# Patient Record
Sex: FEMALE | Race: WHITE
[De-identification: names, ages, dates, MRNs, and addresses within clinical notes are randomized per-mention and may not be internally consistent; named-entity substitution may affect disease eponyms.]

---

## 2018-04-15 ENCOUNTER — HOSPITAL ENCOUNTER (INPATIENT)
Dept: HOSPITAL 88 - ER | Age: 58
LOS: 8 days | Discharge: HOME | DRG: 189 | End: 2018-04-23
Attending: INTERNAL MEDICINE | Admitting: INTERNAL MEDICINE
Payer: MEDICARE

## 2018-04-15 VITALS — WEIGHT: 258.06 LBS | BODY MASS INDEX: 45.72 KG/M2 | HEIGHT: 63 IN

## 2018-04-15 DIAGNOSIS — I13.0: ICD-10-CM

## 2018-04-15 DIAGNOSIS — E87.5: ICD-10-CM

## 2018-04-15 DIAGNOSIS — L89.152: ICD-10-CM

## 2018-04-15 DIAGNOSIS — E83.41: ICD-10-CM

## 2018-04-15 DIAGNOSIS — E66.01: ICD-10-CM

## 2018-04-15 DIAGNOSIS — E87.8: ICD-10-CM

## 2018-04-15 DIAGNOSIS — J96.21: Primary | ICD-10-CM

## 2018-04-15 DIAGNOSIS — G47.33: ICD-10-CM

## 2018-04-15 DIAGNOSIS — Z79.01: ICD-10-CM

## 2018-04-15 DIAGNOSIS — Z88.2: ICD-10-CM

## 2018-04-15 DIAGNOSIS — Z88.0: ICD-10-CM

## 2018-04-15 DIAGNOSIS — K21.9: ICD-10-CM

## 2018-04-15 DIAGNOSIS — J45.901: ICD-10-CM

## 2018-04-15 DIAGNOSIS — I48.0: ICD-10-CM

## 2018-04-15 DIAGNOSIS — D64.9: ICD-10-CM

## 2018-04-15 DIAGNOSIS — J44.1: ICD-10-CM

## 2018-04-15 DIAGNOSIS — N18.3: ICD-10-CM

## 2018-04-15 DIAGNOSIS — I50.32: ICD-10-CM

## 2018-04-15 DIAGNOSIS — E11.22: ICD-10-CM

## 2018-04-15 PROCEDURE — 94010 BREATHING CAPACITY TEST: CPT

## 2018-04-15 PROCEDURE — 51700 IRRIGATION OF BLADDER: CPT

## 2018-04-15 PROCEDURE — 82553 CREATINE MB FRACTION: CPT

## 2018-04-15 PROCEDURE — 96372 THER/PROPH/DIAG INJ SC/IM: CPT

## 2018-04-15 PROCEDURE — 94660 CPAP INITIATION&MGMT: CPT

## 2018-04-15 PROCEDURE — 85730 THROMBOPLASTIN TIME PARTIAL: CPT

## 2018-04-15 PROCEDURE — 71046 X-RAY EXAM CHEST 2 VIEWS: CPT

## 2018-04-15 PROCEDURE — 99285 EMERGENCY DEPT VISIT HI MDM: CPT

## 2018-04-15 PROCEDURE — 80053 COMPREHEN METABOLIC PANEL: CPT

## 2018-04-15 PROCEDURE — 97139 UNLISTED THERAPEUTIC PX: CPT

## 2018-04-15 PROCEDURE — 80048 BASIC METABOLIC PNL TOTAL CA: CPT

## 2018-04-15 PROCEDURE — 36415 COLL VENOUS BLD VENIPUNCTURE: CPT

## 2018-04-15 PROCEDURE — 84100 ASSAY OF PHOSPHORUS: CPT

## 2018-04-15 PROCEDURE — 82140 ASSAY OF AMMONIA: CPT

## 2018-04-15 PROCEDURE — 82805 BLOOD GASES W/O2 SATURATION: CPT

## 2018-04-15 PROCEDURE — 94640 AIRWAY INHALATION TREATMENT: CPT

## 2018-04-15 PROCEDURE — 96374 THER/PROPH/DIAG INJ IV PUSH: CPT

## 2018-04-15 PROCEDURE — 93005 ELECTROCARDIOGRAM TRACING: CPT

## 2018-04-15 PROCEDURE — 83735 ASSAY OF MAGNESIUM: CPT

## 2018-04-15 PROCEDURE — 85610 PROTHROMBIN TIME: CPT

## 2018-04-15 PROCEDURE — 83880 ASSAY OF NATRIURETIC PEPTIDE: CPT

## 2018-04-15 PROCEDURE — 71045 X-RAY EXAM CHEST 1 VIEW: CPT

## 2018-04-15 PROCEDURE — 85025 COMPLETE CBC W/AUTO DIFF WBC: CPT

## 2018-04-15 PROCEDURE — 84484 ASSAY OF TROPONIN QUANT: CPT

## 2018-04-15 PROCEDURE — 81001 URINALYSIS AUTO W/SCOPE: CPT

## 2018-04-15 PROCEDURE — 36600 WITHDRAWAL OF ARTERIAL BLOOD: CPT

## 2018-04-15 PROCEDURE — 96367 TX/PROPH/DG ADDL SEQ IV INF: CPT

## 2018-04-15 PROCEDURE — 82948 REAGENT STRIP/BLOOD GLUCOSE: CPT

## 2018-04-15 PROCEDURE — 82550 ASSAY OF CK (CPK): CPT

## 2018-04-15 NOTE — XMS REPORT
Continuity of Care Document

 Created on: 2018



MARGY OLIVEIRA

External Reference #: [id number]

: 1960

Sex: Female



Demographics







 Address  [address] [place]  [address]

 

 Home Phone  [phone]

 

 Preferred Language  Unknown

 

 Marital Status  Unknown

 

 Catholic Affiliation  Unknown

 

 Race  White

 

 Ethnic Group  Unknown





Author







 Author  [facility]

 

 Organization  [facility]

 

 Address  [address]

[place]  [address]



 

 Phone  Unavailable







Support







 Name  Relationship  Address  Phone

 

 ADELAIDA GIRALDO MD  Caregiver  [address]

[place]  [address]  Unavailable

 

 BRIAN MELVIN MD  Caregiver  [address]

[address]

[place]  [address]  [phone]

 

 BRIAN MELVIN MD  Caregiver  [address]

[address]

[place]  [address]  [phone]

 

 BRIAN MELVIN MD  Caregiver  [address]

[address]

[place]  [address]  [phone]

 

 BRIAN MELVIN MD  Caregiver  [address]

[address]

[place]  [address]  [phone]

 

 SAGAR OLIVEIRA  Next Of Kin  [address], [place]  [address] [phone]







Care Team Providers







 Care Team Member Name  Role  Phone

 

 BRIAN MELVIN MD  PCP  [phone]







Insurance Providers







 Guarantor  Margy Oliveira

 

 Address  [address] [place] [address]

 

 Phone  [phone]

 

 Email  [email or web address]











 Payer  [employer] Complete

 

 Policy Number  [id number]

 

 Subscriber's Name  OliveiraMargy

 

 Relationship  18 Self / Same As Patient

 

 Group Number  [id number]

 

 Effective Date  18







Advance Directives







 Directive  Response  Recorded Date/Time

 

 Does the patient have an advance directive?  No  18 12:53pm

 

 If yes, is advance directive on file with [facility]?  No  18 12:53pm

 

 If not on file with [facility] will patient provide a copy?  No  18 12:53pm

 

 Do you have a Directive to Physician?  No  18 4:42pm

 

 Do you have a Medical Power of ?  No  18 4:42pm

 

 Do you have an out of hospital Do Not Resuscitate Order?  No  18 4:42pm

 

 Do you have any special needs we should be aware of?  No  18 4:42pm

 

 Do you have a support person here with you today?  Yes  18 4:42pm

 

 Did patient receive Notice of Privacy Practices?  Yes  18 4:42pm

 

 Did patient receive patient rights and responsibilities?  Yes  18 4:42pm







Problems

No problem information available.



Medications





Current Home Medications





 Medication  Dose  Units  Route  Directions  Days  Qty  Instructions  Start Date

 

 Amiodarone Hcl 200 Mg Tablet  200  Mg  Oral  Daily  30 Days        18

 

 Apixaban 5 Tablet  5  Mg  Oral  Twice A Day  30 Days        18

 

 Ascorbic Acid 500 Mg Tablet  500  Mg  Oral  Twice A Day  30 Days        

 

 Balsam [place]/Castor Oil (Venelex Ointment) 60 Gm Oint...g.  60  Gm  Topical  
Twice A Day  30 Days        18

 

 Calcium Carbonate/Vitamin D3 (Calcium 500 + D Tablet) 1 Each Tablet  1  Tab  
Oral  Twice A Day  30 Days        18

 

 Diazepam (Valium) 5 Mg Tablet  5  Mg  Oral  Twice A Day as needed for Anxiety 
           

 

 Duoneb  3  Ml  Nebullizer  Four Times Daily as needed for Shortness Of Breath 
           

 

 Ezetimibe (Zetia) 10 Mg Tablet  10  Mg  Oral  Daily     30 Tab      

 

 Fluticasone/Salmeterol (Advair 250-50 Diskus) 1 Each Disk.w.dev  1  Dose  
Inhalation  Daily            

 

 Furosemide (Lasix) 40 Mg Tablet  40  Mg  Oral  Twice A Day     30 Tab      

 

 Hydrocodone Bit/Acetaminophen (Norco  Tablet) 1 Each Tablet  1  Tab  
Oral  Every 8 Hours as needed for Pain            

 

 Insulin Detemir (Levemir) 100 Unit/1 Ml Vial  15  Units  Subcutaneously  Twice 
A Day            

 

 Magnesium Oxide 400 Mg Tablet  400  Mg  Oral  Twice A Day  30 Days        

 

 Multivit With Calcium,Iron,Min (Multiple Vitamins For Women) 1 Each Tablet  1  
Tab  Oral  Daily  30 Days        18

 

 Nifedipine (Nifedipine Er) 30 Mg Tab.er.24  90  Mg  Oral  Daily  30 Days      
  18

 

 Nystatin/Triamcinolone 15 Gm Cr  0  Gm  Topically  Daily  30 Days        

 

 Omeprazole 40 Mg Capsule.dr  40  Mg  Oral  Twice A Day            

 

 Sumatriptan Succinate (Imitrex) 25 Mg Tablet  25  Mg  Oral  Daily as needed 
for Headache            

 

 Tiotropium Bromide (Spiriva) 18 Mcg Cap.w.dev  18  Mcg  Inhalation  Daily     
       

 

 Zinc Sulfate (Zinc-220) 220 Mg Capsule  1  Cap  Oral  Twice A Day  30 Days    
    18









Past Home Medications





 Medication  Directions  Ordered  Status

 

 Azithromycin (Z-Momo) 250 Mg Tablet, 250 Mg Oral  Use As Directed     
Discontinued

 

 Sulfamethoxazole/Trimethoprim (Bactrim Ds Tablet) 1 Each Tablet, 1 Tab Oral  
Twice A Day     Discontinued







Social History







 Social History Problem  Response  Recorded Date/Time  Onset Date  Status

 

 Hx Psychiatric Problems  No  2018 12:53pm  Not Applicable  Not Applicable

 

 Hx Eating Disorder  No  2018 12:53pm  Not Applicable  Not Applicable

 

 Hx Substance Use Disorder  No  2018 12:53pm  Not Applicable  Not 
Applicable

 

 Hx Depression  No  2018 12:53pm  Not Applicable  Not Applicable

 

 Hx Alcohol Use  No  2018 12:53pm  Not Applicable  Not Applicable

 

 Hx Substance Use Treatment  No  2018 12:53pm  Not Applicable  Not 
Applicable

 

 Hx Physical Abuse  No  2018 12:53pm  Not Applicable  Not Applicable











 Smoking Status  Start Date  Stop Date

 

 Never Smoker      







Hospital Discharge Instructions

No hospital discharge instruction information available.



Plan of Care







 Discharge Date  18 9:50pm

 

 Disposition  HOME, SELF-CARE

 

 Instructions/Education Provided  Diabetes and Diet

 

 Prescriptions  See Medication Section

 

 Additional Instructions/Education  F/U WITH PCP IN 1-2 WEEKS







Functional Status







 Query  Response  Date Recorded

 

 FUNCTIONAL STATUS  .

  2018 11:31am

 

 Assistive Devices  None

  2018 4:00pm

 

 Ambulation Ability  Standby Assistance

  2018 4:00pm

 

 Toileting Ability  Independent

  2018 5:43pm







Allergies, Adverse Reactions, Alerts







 Allergen  Type  Severity  Reaction  Status  Last Updated

 

 Penicillin  Allergy  Severe  ITCHING, ANAPHYLAXIS  Active  18

 

 Sulfa (Sulfonamide Antibiotics)  Allergy  Intermediate     Active  18

 

 Naproxen  Allergy  Intermediate     Active  18







Immunizations

No immunization information available.



Vital Signs





Acute Vital Signs





 Vital  Response  Date/Time

 

 Temperature (Fahrenheit)  97.0 degrees F (97.6 - 99.5)  2018 4:00pm

 

 Pulse      

 

    Pulse Rate (adult)  71 bpm (60 - 90)  2018 4:00pm

 

 Respiratory Rate  19 bpm (12 - 24)  2018 4:00pm

 

 Blood Pressure  164/74 mm Hg  2018 4:00pm

 

 Height  5 ft 0 in  2018 6:33am

 

 Weight  280.38 lb  2018 12:47am

 

 Body Mass Index  54.8 kg/m^2  2018 12:47am







Results





Laboratory Results





 Test Name  Result  Units  Flags  Reference  Collection Date/Time  Result Date/
Time  Comments

 

 Band Neutrophils %  12  %        2018 9:03am  2018 11:10am   

 

 Reactive Lymphocytes  1           2018 5:00am  2018 8:45am   

 

 Urine Amorphous Sediment  MANY      H  FEW  2018 12:00pm  2018 12:
35pm   

 

 Hemoglobin A1c Percent  7.2  %   H  4.0-7.0  2018 4:20am  2018 8:
59am   

 

 Iron Level  41  ug/dL   L    2018 4:20am  2018 8:59am   

 

 Total Iron Binding Capacity  273  ug/dL     261-478  2018 4:20am  2018 8:59am   

 

 Percent Iron Saturation  15  %     15-50  2018 4:20am  2018 8:59am
   

 

 Transferrin  195  mg/dL     180-382  2018 4:20am  2018 8:59am   

 

 Vitamin B12 Level  578  pg/mL     213-816  2018 4:20am  2018 11:
23am   

 

 Folate  19.1  ng/mL   H  7.0-15.4  2018 4:20am  2018 11:23am   

 

 Free Thyroxine  1.66  ng/dL     0.9-1.8  2018 4:20am  2018 11:06am
   

 

 White Blood Count  10.69  x10e3/uL     4.8-10.8  2018 5:35am  2018 
5:56am   

 

 Red Blood Count  3.23  x10e6/uL   L  3.6-5.1  2018 5:35am  2018 5:
56am   

 

 Hemoglobin  10.5  g/dL   L  12.0-16.0  2018 5:35am  2018 5:56am   

 

 Hematocrit  32.9  %   L  34.2-44.1  2018 5:35am  2018 5:56am   

 

 Mean Corpuscular Volume  101.9  fL   H  81-99  2018 5:35am  2018 5:
56am   

 

 Mean Corpuscular Hemoglobin  32.5  pg   H  28-32  2018 5:35am  2018 5:56am   

 

 Mean Corpuscular Hemoglobin Concent  31.9  g/dL     31-35  2018 5:35am  
2018 5:56am   

 

 Red Cell Distribution Width  14.4  %     11.7-14.4  2018 5:35am  2018 5:56am   

 

 Platelet Count  202  x10e3/uL     140-360  2018 5:35am  2018 5:
56am   

 

 Neutrophils (%) (Auto)  62.4  %     38.7-80.0  2018 5:35am  2018 5:
56am   

 

 Lymphocytes (%) (Auto)  27.7  %     18.0-39.1  2018 5:35am  2018 5:
56am   

 

 Monocytes (%) (Auto)  5.8   %     4.4-11.3  2018 5:35am  2018 5:
56am   

 

 Eosinophils (%) (Auto)  2.3  %     0.0-6.0  2018 5:35am  2018 5:
56am   

 

 Basophils (%) (Auto)  0.5  %     0.0-1.0  2018 5:35am  2018 5:56am
   

 

 IM GRANULOCYTES %  1.3  %   H  0.0-1.0  2018 5:35am  2018 5:56am   

 

 Neutrophils # (Auto)  6.7        2.1-6.9  2018 5:35am  2018 5:56am
   

 

 Lymphocytes # (Auto)  3.0        1.0-3.2  2018 5:35am  2018 5:56am
   

 

 Monocytes # (Auto)  0.6        0.2-0.8  2018 5:35am  2018 5:56am   

 

 Eosinophils # (Auto)  0.3        0.0-0.4  2018 5:35am  2018 5:56am
   

 

 Basophils # (Auto)  0.1        0.0-0.1  2018 5:35am  2018 5:56am   

 

 Absolute Immature Granulocyte (auto  0.14  x10e3/uL   H  0-0.1  2018 5:
35am  2018 5:56am   

 

 Differential Total Cells Counted  100           2018 5:30am  2018 7
:24am   

 

 Neutrophils % (Manual)  65  %     40-74  2018 5:30am  2018 7:24am 
  

 

 Lymphocytes % (Manual)  26  %     19-48  2018 5:30am  2018 7:24am 
  

 

 Monocytes % (Manual)  6  %     3.4-9.0  2018 5:30am  2018 7:24am   

 

 Eosinophils % (Manual)  3  %     0-7  2018 5:30am  2018 7:24am   

 

 Basophils % (Manual)  2  %   H  0-1.5  2018 6:00am  2018 7:58am   

 

 Metamyelocytes %  2  %   H  0-0  2018 6:00am  2018 7:58am   

 

 Platelet Estimate  ADEQUATE           2018 5:30am  2018 7:24am   

 

 Platelet Morphology Comment  NORMAL           2018 5:30am  2018 7:
24am   

 

 Hypochromasia  SLIGHT           2018 5:30am  2018 7:24am   

 

 Anisocytosis  SLIGHT           2018 5:30am  2018 7:24am   

 

 Macrocytosis  SLIGHT           2018 6:00am  2018 7:58am   

 

 Red Cell Morphology Comment  NORMAL           2018 5:30am  2018 7:
24am   

 

 Prothrombin Time  13.0  seconds     11.9-14.5  2018 5:15pm  2018 5:
46pm   

 

 Prothromb Time International Ratio  1.06           2018 5:15pm  2018 5:46pm  Oral Anticoagulant Therapy INR Values:



 1. Low Intensity Therapy        1.5 - 2.0



 2. Moderate Intensity Therapy   2.0 - 3.0



 3. High Intensity Therapy(1)    2.5 - 3.5



 4. High Intensity Therapy(2)    3.0 - 4.0



 5. Panic Value INR              > 5.0

 

 Activated Partial Thromboplast Time  29.4  seconds     23.8-35.5  2018 5:
15pm  2018 5:46pm   

 

 Urine Color  YELLOW        YELLOW  2018 11:05am  2018 12:44pm   

 

 Urine Clarity  CLEAR        CLEAR  2018 11:05am  2018 12:44pm   

 

 Urine Specific Gravity  1.010        1.010-1.025  2018 11:05am  2018 12:44pm   

 

 Urine pH  5        5 - 7  2018 11:05am  2018 12:44pm   

 

 Urine Leukocyte Esterase  NEGATIVE        NEGATIVE  2018 11:05am  2018 12:44pm   

 

 Urine Nitrite  NEGATIVE        NEGATIVE  2018 11:05am  2018 12:
44pm   

 

 Urine Protein  1+      H  NEGATIVE  2018 11:05am  2018 12:44pm   

 

 Urine Glucose (UA)  NEGATIVE        NEGATIVE  2018 11:05am  2018 12
:44pm   

 

 Urine Ketones  NEGATIVE        NEGATIVE  2018 11:05am  2018 12:
44pm   

 

 Urine Urobilinogen  0.2  mg/dL     0.2 - 1  2018 11:05am  2018 12:
44pm   

 

 Urine Bilirubin  NEGATIVE        NEGATIVE  2018 11:05am  2018 12:
44pm   

 

 Urine Blood  NEGATIVE        NEGATIVE  2018 11:05am  2018 12:44pm 
  

 

 Urine WBC  0-5  /HPF     0-5  2018 11:05am  2018 12:54pm   

 

 Urine RBC  0-5  /HPF     0-5  2018 11:05am  2018 12:54pm   

 

 Urine Bacteria  FEW  /HPF     NONE  2018 11:05am  2018 12:54pm   

 

 Urine Epithelial Cells  FEW  /LPF     NONE  2018 11:05am  2018 12:
54pm   

 

 Urine Mucus  FEW      H  RARE  2018 6:55pm  2018 7:31pm   

 

 Sodium Level  145  mmol/L     136-145  2018 5:35am  2018 6:18am   

 

 Potassium Level  4.7  mmol/L     3.5-5.1  2018 5:35am  2018 6:18am
   

 

 Chloride Level  96  mmol/L   L    2018 5:35am  2018 6:18am   

 

 Influenza Virus Types A,B Antigen  NEGATIVE        NEGATIVE  2018 5:18am
  2018 6:11am   

 

 Carbon Dioxide Level  41  mmol/L  *H  2018 5:35am  2018 6:
18am  Results called to HOLLAND BROWN at 0615 on 18 by Erma Reyes. RB OK.

 

 Anion Gap  12.7  mmol/L     8-2018 5:35am  2018 6:18am   

 

 Blood Urea Nitrogen  47  mg/dL   H  7-2018 5:35am  2018 6:18am
   

 

 Creatinine  1.23  mg/dL   H  0.57-1.11  2018 5:35am  2018 6:18am   

 

 BUN/Creatinine Ratio  38      H  62018 5:35am  2018 6:18am   

 

 Estimat Glomerular Filtration Rate  45  ML/MIN   L  60-  2018 5:35am   6:18am  Ranges were taken from the National Kidney Disease Education 

Program and the National Kid[employer] Foundation literature.







Reference ranges:



 60 or greater: Normal



 16-59 (for 3 consecutive months): Chronic kidney disease 



 15 or less: Kidney failure

 

 Glucose Level  119  mg/dL   H    2018 5:35am  2018 6:18am   

 

 Calcium Level  9.8  mg/dL     8.4-10.2  2018 5:35am  2018 6:18am   

 

 Bedside Glucose  184  mg/dL   H    2018 8:54pm  2018 9:24pm  
Meter ID: [id number]



 

 Lactic Acid Level  6.6  MG/DL     4.5-19.8  2018 5:15pm  2018 6:
20pm   

 

 Phosphorus Level  4.3  MG/DL     2.3-4.7  2018 5:182018 6:52am
   

 

 Magnesium Level  1.8  MG/DL     1.3-2.1  2018 5:35am  2018 6:18am 
  

 

 Total Bilirubin  0.4  mg/dL     0.2-1.2  2018 5:182018 6:33am 
  

 

 Aspartate Amino Transf (AST/SGOT)  10  IU/L     5-34  2018 5:182018 6:33am   

 

 Alanine Aminotransferase (ALT/SGPT)  10  IU/L     0-55  2018 5:18 6:33am   

 

 Total Protein  6.4  g/dL   L  6.5-8.1  2018 5:182018 6:33am   

 

 Albumin  3.2  g/dL   L  3.5-5.0  2018 5:182018 6:33am   

 

 Globulin  3.2  g/dL     2.3-3.5  2018 5:182018 6:33am   

 

 Albumin/Globulin Ratio  1.0        0.8-2.0  2018 5:182018 6:
33am   

 

 Alkaline Phosphatase  58  IU/L       2018 5:182018 6:
33am   

 

 B-Type Natriuretic Peptide  435.7  pg/mL   H  0-100  2018 6:15am  2018 7:28am   

 

 Creatine Kinase  22  IU/L   L    2018 12:50pm  2018 1:19pm   

 

 Creatine Kinase MB  2.10  ng/mL     0-5.0  2018 12:50pm  2018 1:
28pm   

 

 Troponin I  0.081  ng/mL     0-0.300  2018 12:50pm  2018 1:28pm   

 

 Lipase  23  U/L     8-78  2018 5:15pm  2018 5:55pm   

 

 Thyroid Stimulating Hormone (TSH)  1.607  uIU/mL     0.350-4.940  2018 5:
15am  2018 6:59am   

 

 Digoxin Level  1.40  ng/mL     0.8-2.0  2018 5:20am  2018 6:39am   

 

 Arterial Blood pH  7.43      H  7.31-7.41  2018 11:04am  2018 11:
45am   

 

 Arterial Blood Partial Pressure CO2  54  mmHg   H  41-51  2018 11:04am  
2018 11:45am   

 

 Arterial Blood Partial Pressure O2  71  mmHg   L    2018 11:04am  
2018 11:45am   

 

 Arterial Blood HCO3  36  mmol/L   H  23-28  2018 11:04am  2018 11:
45am   

 

 Arterial Blood Base Excess  12.0  mmol/L   H  -2 - 3  2018 11:04am   11:45am   

 

 Arterial Blood Oxygen Saturation  94.0  %   L  95-98  2018 11:04am   11:45am   









Microbiology Results





 Procedure  Source  Organism/Result  Collection Date/Time  Result Date/Time  
Result Status

 

 Sputum Culture  Sputum, Expectorated Sputum  STAPHYLOCOCCUS AUREUS-MRSA  2018 8:34pm  2018 6:01am  Final

 

 Blood Culture  Blood                               NO GROWTH AFTER 48 HOURS   11:40am  2018 11:56am  Preliminary







Procedures







 Procedure  Status  Date  Provider(s)

 

 RESPIRATORY VENTILATION, 24-96 CONSECUTIVE HOURS  Completed  18  
FANTA KELLY MD

 

 INSERTION OF ENDOTRACHEAL AIRWAY INTO TRACHEA, VIA OPENING  Completed    FANTA KELLY MD

 

 ASSISTANCE WITH RESPIRATORY VENTILATION, <24 HRS, CPAP  Completed  18  
FANTA KELLY MD

 

 MRI (magnetic resonance imaging)  Active  18  TA RUIZ MD

 

 Ultrasound of chest including mediastinum  Active  18  YORDAN SOTO MD







Encounters







 Encounter  Location  Arrival/Admit Date  Discharge/Depart Date  Attending 
Provider

 

 Discharged Inpatient  [facility]  18 10:05pm   9:50pm  BRIAN MELVIN MD

 

 Discharged Inpatient  [facility]  18 11:57am   4:52pm  BRIAN MELVIN MD

## 2018-04-16 VITALS — DIASTOLIC BLOOD PRESSURE: 64 MMHG | SYSTOLIC BLOOD PRESSURE: 123 MMHG

## 2018-04-16 VITALS — SYSTOLIC BLOOD PRESSURE: 123 MMHG | DIASTOLIC BLOOD PRESSURE: 64 MMHG

## 2018-04-16 LAB
ALBUMIN SERPL-MCNC: 3.4 G/DL (ref 3.5–5)
ALBUMIN/GLOB SERPL: 0.9 {RATIO} (ref 0.8–2)
ALP SERPL-CCNC: 86 IU/L (ref 40–150)
ALT SERPL-CCNC: 10 IU/L (ref 0–55)
ANION GAP SERPL CALC-SCNC: 15.9 MMOL/L (ref 8–16)
BACTERIA URNS QL MICRO: (no result) /HPF
BASOPHILS # BLD AUTO: 0.1 10*3/UL (ref 0–0.1)
BASOPHILS NFR BLD AUTO: 0.9 % (ref 0–1)
BILIRUB UR QL: NEGATIVE
BUN SERPL-MCNC: 31 MG/DL (ref 7–26)
BUN/CREAT SERPL: 21 (ref 6–25)
CALCIUM SERPL-MCNC: 9.6 MG/DL (ref 8.4–10.2)
CHLORIDE SERPL-SCNC: 95 MMOL/L (ref 98–107)
CK MB SERPL-MCNC: 1.5 NG/ML (ref 0–5)
CK MB SERPL-MCNC: 1.5 NG/ML (ref 0–5)
CK MB SERPL-MCNC: 1.9 NG/ML (ref 0–5)
CK MB SERPL-MCNC: 2.2 NG/ML (ref 0–4.3)
CK SERPL-CCNC: 19 IU/L (ref 29–168)
CK SERPL-CCNC: 21 IU/L (ref 29–168)
CK SERPL-CCNC: 25 IU/L (ref 29–168)
CK SERPL-CCNC: 27 IU/L (ref 29–168)
CLARITY UR: CLEAR
CO2 SERPL-SCNC: 34 MMOL/L (ref 22–29)
COLOR UR: YELLOW
DEPRECATED APTT PLAS QN: 37.4 SECONDS (ref 23.8–35.5)
DEPRECATED INR PLAS: 1.24
DEPRECATED NEUTROPHILS # BLD AUTO: 5.8 10*3/UL (ref 2.1–6.9)
DEPRECATED RBC URNS MANUAL-ACNC: (no result) /HPF (ref 0–5)
EGFRCR SERPLBLD CKD-EPI 2021: 36 ML/MIN (ref 60–?)
EOSINOPHIL # BLD AUTO: 0.2 10*3/UL (ref 0–0.4)
EOSINOPHIL NFR BLD AUTO: 2 % (ref 0–6)
EPI CELLS URNS QL MICRO: (no result) /LPF
ERYTHROCYTE [DISTWIDTH] IN CORD BLOOD: 14.6 % (ref 11.7–14.4)
GLOBULIN PLAS-MCNC: 3.6 G/DL (ref 2.3–3.5)
GLUCOSE SERPLBLD-MCNC: 269 MG/DL (ref 74–118)
HCT VFR BLD AUTO: 32.3 % (ref 34.2–44.1)
HGB BLD-MCNC: 10.2 G/DL (ref 12–16)
KETONES UR QL STRIP.AUTO: NEGATIVE
LEUKOCYTE ESTERASE UR QL STRIP.AUTO: NEGATIVE
LYMPHOCYTES # BLD: 1.3 10*3/UL (ref 1–3.2)
LYMPHOCYTES NFR BLD AUTO: 15.8 % (ref 18–39.1)
MCH RBC QN AUTO: 33 PG (ref 28–32)
MCHC RBC AUTO-ENTMCNC: 31.6 G/DL (ref 31–35)
MCV RBC AUTO: 104.5 FL (ref 81–99)
MONOCYTES # BLD AUTO: 0.6 10*3/UL (ref 0.2–0.8)
MONOCYTES NFR BLD AUTO: 6.8 % (ref 4.4–11.3)
NEUTS SEG NFR BLD AUTO: 72.6 % (ref 38.7–80)
NITRITE UR QL STRIP.AUTO: NEGATIVE
NON-SQ EPI CELLS URNS QL MICRO: (no result)
PLATELET # BLD AUTO: 210 X10E3/UL (ref 140–360)
POTASSIUM SERPL-SCNC: 4.9 MMOL/L (ref 3.5–5.1)
PROT UR QL STRIP.AUTO: NEGATIVE
PROTHROMBIN TIME: 14.7 SECONDS (ref 11.9–14.5)
RBC # BLD AUTO: 3.09 X10E6/UL (ref 3.6–5.1)
SODIUM SERPL-SCNC: 140 MMOL/L (ref 136–145)
SP GR UR STRIP: 1.02 (ref 1.01–1.02)
UROBILINOGEN UR STRIP-MCNC: 0.2 MG/DL (ref 0.2–1)
WBC #/AREA URNS HPF: (no result) /HPF (ref 0–5)

## 2018-04-16 PROCEDURE — 3E0F7GC INTRODUCTION OF OTHER THERAPEUTIC SUBSTANCE INTO RESPIRATORY TRACT, VIA NATURAL OR ARTIFICIAL OPENING: ICD-10-PCS | Performed by: INTERNAL MEDICINE

## 2018-04-16 PROCEDURE — 5A09457 ASSISTANCE WITH RESPIRATORY VENTILATION, 24-96 CONSECUTIVE HOURS, CONTINUOUS POSITIVE AIRWAY PRESSURE: ICD-10-PCS | Performed by: INTERNAL MEDICINE

## 2018-04-16 RX ADMIN — FLUTICASONE PROPIONATE AND SALMETEROL SCH EA: 50; 250 POWDER RESPIRATORY (INHALATION) at 09:00

## 2018-04-16 RX ADMIN — IPRATROPIUM BROMIDE AND ALBUTEROL SULFATE SCH ML: .5; 2.5 SOLUTION RESPIRATORY (INHALATION) at 07:22

## 2018-04-16 RX ADMIN — OXYCODONE HYDROCHLORIDE AND ACETAMINOPHEN SCH MG: 500 TABLET ORAL at 08:55

## 2018-04-16 RX ADMIN — INSULIN HUMAN SCH UNIT: 100 INJECTION, SOLUTION PARENTERAL at 08:31

## 2018-04-16 RX ADMIN — DOXYCYCLINE SCH MLS/HR: 100 INJECTION, POWDER, LYOPHILIZED, FOR SOLUTION INTRAVENOUS at 18:37

## 2018-04-16 RX ADMIN — INSULIN HUMAN SCH UNIT: 100 INJECTION, SOLUTION PARENTERAL at 22:12

## 2018-04-16 RX ADMIN — GUAIFENESIN AND DEXTROMETHORPHAN HYDROBROMIDE SCH EACH: 600; 30 TABLET, EXTENDED RELEASE ORAL at 12:30

## 2018-04-16 RX ADMIN — CASTOR OIL AND BALSAM, PERU SCH GM: 788; 87 OINTMENT TOPICAL at 08:58

## 2018-04-16 RX ADMIN — NYSTATIN AND TRIAMCINOLONE ACETONIDE SCH GM: 100000; 1 CREAM TOPICAL at 08:58

## 2018-04-16 RX ADMIN — PANTOPRAZOLE SODIUM SCH MG: 40 TABLET, DELAYED RELEASE ORAL at 18:29

## 2018-04-16 RX ADMIN — Medication SCH MG: at 08:57

## 2018-04-16 RX ADMIN — IPRATROPIUM BROMIDE AND ALBUTEROL SULFATE SCH ML: .5; 2.5 SOLUTION RESPIRATORY (INHALATION) at 03:15

## 2018-04-16 RX ADMIN — FUROSEMIDE SCH MG: 40 TABLET ORAL at 18:36

## 2018-04-16 RX ADMIN — FAMOTIDINE SCH MG: 20 TABLET, FILM COATED ORAL at 18:29

## 2018-04-16 RX ADMIN — FAMOTIDINE SCH MG: 20 TABLET, FILM COATED ORAL at 12:20

## 2018-04-16 RX ADMIN — TIOTROPIUM BROMIDE SCH MCG: 18 CAPSULE ORAL; RESPIRATORY (INHALATION) at 09:00

## 2018-04-16 RX ADMIN — CASTOR OIL AND BALSAM, PERU SCH GM: 788; 87 OINTMENT TOPICAL at 18:36

## 2018-04-16 RX ADMIN — OXYCODONE HYDROCHLORIDE AND ACETAMINOPHEN SCH MG: 500 TABLET ORAL at 18:36

## 2018-04-16 RX ADMIN — Medication SCH MG: at 08:58

## 2018-04-16 RX ADMIN — HYDROCODONE BITARTRATE AND ACETAMINOPHEN PRN EA: 10; 325 TABLET ORAL at 19:30

## 2018-04-16 RX ADMIN — APIXABAN SCH MG: 5 TABLET, FILM COATED ORAL at 18:36

## 2018-04-16 RX ADMIN — FUROSEMIDE SCH MG: 40 TABLET ORAL at 08:55

## 2018-04-16 RX ADMIN — IPRATROPIUM BROMIDE AND ALBUTEROL SULFATE SCH ML: .5; 2.5 SOLUTION RESPIRATORY (INHALATION) at 11:15

## 2018-04-16 RX ADMIN — GUAIFENESIN AND DEXTROMETHORPHAN HYDROBROMIDE SCH EACH: 600; 30 TABLET, EXTENDED RELEASE ORAL at 18:36

## 2018-04-16 RX ADMIN — IPRATROPIUM BROMIDE AND ALBUTEROL SULFATE SCH ML: .5; 2.5 SOLUTION RESPIRATORY (INHALATION) at 23:22

## 2018-04-16 RX ADMIN — HYDROCODONE BITARTRATE AND ACETAMINOPHEN PRN EA: 10; 325 TABLET ORAL at 08:58

## 2018-04-16 RX ADMIN — EZETIMIBE SCH MG: 10 TABLET ORAL at 09:22

## 2018-04-16 RX ADMIN — Medication SCH MG: at 18:36

## 2018-04-16 RX ADMIN — APIXABAN SCH MG: 5 TABLET, FILM COATED ORAL at 08:58

## 2018-04-16 RX ADMIN — AMIODARONE HYDROCHLORIDE SCH MG: 200 TABLET ORAL at 08:55

## 2018-04-16 RX ADMIN — INSULIN HUMAN SCH UNIT: 100 INJECTION, SOLUTION PARENTERAL at 12:26

## 2018-04-16 RX ADMIN — PANTOPRAZOLE SODIUM SCH MG: 40 TABLET, DELAYED RELEASE ORAL at 07:55

## 2018-04-16 RX ADMIN — INSULIN HUMAN SCH UNIT: 100 INJECTION, SOLUTION PARENTERAL at 18:35

## 2018-04-16 RX ADMIN — IPRATROPIUM BROMIDE AND ALBUTEROL SULFATE SCH ML: .5; 2.5 SOLUTION RESPIRATORY (INHALATION) at 15:24

## 2018-04-16 RX ADMIN — METHYLPREDNISOLONE SODIUM SUCCINATE SCH MG: 125 INJECTION, POWDER, FOR SOLUTION INTRAMUSCULAR; INTRAVENOUS at 22:12

## 2018-04-16 NOTE — DIAGNOSTIC IMAGING REPORT
EXAM: CHEST SINGLE (PORTABLE), AP 1 view

INDICATION: Low O2 sats, shortness of breath

COMPARISON: AP view of the chest March 20, 2018



FINDINGS:

LINES/TUBES: None



LUNGS: Limited view of the lungs due to technique and body habitus. Increased

vascular congestion and bibasilar atelectasis. 



PLEURA: Indeterminate for pleural effusions.



HEART AND MEDIASTINUM: Stable enlargement of the cardiomediastinal silhouette.



BONES AND SOFT TISSUES: No acute findings. 



IMPRESSION:

Cardiomegaly, increased vascular congestion and bibasilar atelectasis.







Signed by: Dr. Kristy Devries M.D. on 4/16/2018 12:36 AM

## 2018-04-16 NOTE — DIAGNOSTIC IMAGING REPORT
EXAM: CHEST SINGLE (PORTABLE), AP 1 view

INDICATION: COPD

COMPARISON: AP view of the chest April 16, 2018 at 0009 hours



FINDINGS:

LINES/TUBES: None



LUNGS: Vascular congestion/edema and bibasilar atelectasis. 



PLEURA: Indeterminate for pleural effusions.



HEART AND MEDIASTINUM: Stable enlargement of the cardiomediastinal silhouette.



BONES AND SOFT TISSUES: No acute findings. 



IMPRESSION:

No interval change.







Signed by: Dr. Kristy Devries M.D. on 4/16/2018 6:08 AM

## 2018-04-16 NOTE — HISTORY AND PHYSICAL
PRIMARY CARE PROVIDER:  Dr. Joe Valladares



CHIEF COMPLAINT:  Respiratory distress.



HISTORY OF PRESENT ILLNESS:  Ms. Oliveira is a 57-year-old lady presenting 

with respiratory distress, hypoxia, shortness of breath, wheezing, and a 

hacking cough.  Patient said her O2 sats had gone as low as 40 the night 

before in spite of her CPAP.



REVIEW OF SYSTEMS:  She denies fever, chills, or weight loss.  She denies 

chest pain or palpitations.  She has shortness of breath, wheezing, and 

cough.  She denies abdominal pain, nausea, vomiting, or melena.  She has 

chronic GERD.  She denies dysuria or flank pain.  She denies rash or 

pruritus.  She denies joint pain or swelling.  She denies headache, 

vertigo, or loss of consciousness.  She denies depression, agitation, 

homicidal or suicidal ideation.



PAST MEDICAL HISTORY:  Significant for long-standing hypertension, type 2 

diabetes, chronic diastolic CHF with an EF of 50%, paroxysmal atrial 

fibrillation, chronic kidney disease stage 3, morbid obesity, obstructive 

sleep apnea, and COPD.



PAST SURGICAL HISTORY:  She denies surgical history.



HOME MEDICATIONS:  Include:

1. Levemir 15 units twice daily.

2. Regular insulin on a sliding scale.

3. DuoNeb treatments q.4-6h.

4. Amiodarone 200 mg.

5. Vitamin C.

6. Diazepam 5 mg twice a day.

7. Zetia 10 mg daily.

8. Advair Diskus twice daily.

9. Lasix 40 mg twice daily.

10. Hydrocodone as needed for pain.

11. Mag oxide 400 twice daily.

12. Procardia XL 90 mg daily.

13. Omeprazole 40 mg twice a day before meals.

14. Imitrex as needed for migraine.

15. Spiriva 1 cap inhaled daily.

16. Eliquis 5 mg twice daily.

17. Nystatin topically as needed.



ALLERGIES:  SHE HAS A STATED ALLERGY TO NAPROSYN, SULFA DRUGS, AND 

PENICILLIN.



FAMILY HISTORY:  Significant for hypertension and diabetes.



SOCIAL HISTORY:  The patient is .  English is her primary 

language.  She does not smoke, drink, or use illegal drugs, and she is 

generally independently functioning.



PHYSICAL EXAMINATION:

PSYCHIATRIC:  She is alert and oriented x3 with normal mood and affect.

CONSTITUTIONAL:  She is morbidly obese, is in no acute distress.

VITAL SIGNS:  As follows:  Blood pressure 102/54, pulse 89 and regular, 

respiratory rate 18, O2 sat 97% on 4 liters nasal cannula, temperature 

98.0.

HEENT:  Her head is atraumatic.  Her eyes are anicteric with clear 

conjunctivae.  Ears and nares are without erythema or discharge.  

Oropharynx is clear.

NECK:  Supple with no mass or thyromegaly.

LYMPHATIC SYSTEM:  She has no palpable cervical, axillary, or inguinal 

adenopathy.

CARDIOVASCULAR SYSTEM:  Her heart has a regular rate and rhythm without 

murmur or extra heart sounds.  She has no carotid bruit.  She has trace 

bipedal edema.  Has weak dorsal pedal pulses.

RESPIRATORY:  Lungs revealed diminished breath sounds throughout with some 

wheezing with forced expiration and a dry hacking cough.  She is in no 

acute distress, normal respiratory effort.

GASTROINTESTINAL:  Her abdomen is soft without organomegaly, masses, or 

tenderness.  She has normal bowel sounds present.

CUTANEOUS:  Her skin is warm and dry to touch with no rash or skin 

breakdown.

MUSCULOSKELETAL:  Her joints are in normal alignment without erythema or 

swelling.  She has no calf tenderness.

NEUROLOGIC:  Nonfocal with intact cranial nerves and no motor or sensory 

deficits.



DIAGNOSTIC STUDIES:  Chest x-ray shows cardiomegaly, pulmonary vascular 

congestion, but otherwise no acute disease.  Troponin less than 0.05, 

0.017, 0.010.  .0.  Chemistry shows normal electrolytes.  CO2 is 34, 

which is elevated.  Creatinine 1.48, BUN 31 for a GFR of 36, which is about 

her baseline.  Calcium is 9.6.  Glucose is 269.  Transaminases, bilirubin, 

and alk phos are normal.  CBC shows a white count of 8.0 with 73% 

neutrophils, hemoglobin 10.2, hematocrit 32.3, and platelet count 210,000.



IMPRESSION AND PLAN:

1. Acute hypoxic respiratory failure.  The patient is on bilevel positive 

airway pressure now with supplemental oxygen.

2. Acute exacerbation of chronic obstructive pulmonary disease.  The 

patient is getting aggressive nebulizer treatments, intravenous 

Solu-Medrol, intravenous doxycycline, and Mucinex for expectoration.

3. Hypertension, complicated by chronic diastolic heart failure and chronic 

kidney disease stage 3.  The patient is controlled on her home medications. 

 Will continue Procardia and Lasix.

4. Type 2 diabetes with chronic kidney disease stage 3.  Fair control.  

Will continue with Levemir and sliding scale insulin.

5. Paroxysmal atrial fibrillation.  The patient is currently in sinus 

rhythm.  Will continue her amiodarone and Eliquis.

6. Morbid obesity and obstructive sleep apnea.  The patient has been 

counseled on low-glycemic diet.  Will use calorie restriction and bilevel 

positive airway pressure while sleeping.

7. For prophylaxis, the patient is on Eliquis for stroke and deep venous 

thrombosis prophylaxis, and Protonix for gastrointestinal prophylaxis.







DD:  04/16/2018 18:48

DT:  04/16/2018 20:43

Job#:  [id number]

## 2018-04-17 VITALS — DIASTOLIC BLOOD PRESSURE: 62 MMHG | SYSTOLIC BLOOD PRESSURE: 121 MMHG

## 2018-04-17 VITALS — SYSTOLIC BLOOD PRESSURE: 142 MMHG | DIASTOLIC BLOOD PRESSURE: 71 MMHG

## 2018-04-17 VITALS — SYSTOLIC BLOOD PRESSURE: 140 MMHG | DIASTOLIC BLOOD PRESSURE: 72 MMHG

## 2018-04-17 VITALS — DIASTOLIC BLOOD PRESSURE: 71 MMHG | SYSTOLIC BLOOD PRESSURE: 142 MMHG

## 2018-04-17 VITALS — DIASTOLIC BLOOD PRESSURE: 70 MMHG | SYSTOLIC BLOOD PRESSURE: 134 MMHG

## 2018-04-17 VITALS — SYSTOLIC BLOOD PRESSURE: 137 MMHG | DIASTOLIC BLOOD PRESSURE: 70 MMHG

## 2018-04-17 VITALS — SYSTOLIC BLOOD PRESSURE: 100 MMHG | DIASTOLIC BLOOD PRESSURE: 72 MMHG

## 2018-04-17 LAB
ANION GAP SERPL CALC-SCNC: 14.9 MMOL/L (ref 8–16)
BASOPHILS # BLD AUTO: 0 10*3/UL (ref 0–0.1)
BASOPHILS NFR BLD AUTO: 0.2 % (ref 0–1)
BUN SERPL-MCNC: 41 MG/DL (ref 7–26)
BUN/CREAT SERPL: 25 (ref 6–25)
CALCIUM SERPL-MCNC: 9.7 MG/DL (ref 8.4–10.2)
CHLORIDE SERPL-SCNC: 94 MMOL/L (ref 98–107)
CO2 SERPL-SCNC: 34 MMOL/L (ref 22–29)
DEPRECATED NEUTROPHILS # BLD AUTO: 7.7 10*3/UL (ref 2.1–6.9)
EGFRCR SERPLBLD CKD-EPI 2021: 33 ML/MIN (ref 60–?)
EOSINOPHIL # BLD AUTO: 0 10*3/UL (ref 0–0.4)
EOSINOPHIL NFR BLD AUTO: 0 % (ref 0–6)
ERYTHROCYTE [DISTWIDTH] IN CORD BLOOD: 14.4 % (ref 11.7–14.4)
GLUCOSE SERPLBLD-MCNC: 240 MG/DL (ref 74–118)
HCT VFR BLD AUTO: 31.7 % (ref 34.2–44.1)
HGB BLD-MCNC: 9.8 G/DL (ref 12–16)
LYMPHOCYTES # BLD: 0.5 10*3/UL (ref 1–3.2)
LYMPHOCYTES NFR BLD AUTO: 6.2 % (ref 18–39.1)
MCH RBC QN AUTO: 32.3 PG (ref 28–32)
MCHC RBC AUTO-ENTMCNC: 30.9 G/DL (ref 31–35)
MCV RBC AUTO: 104.6 FL (ref 81–99)
MONOCYTES # BLD AUTO: 0.1 10*3/UL (ref 0.2–0.8)
MONOCYTES NFR BLD AUTO: 1.5 % (ref 4.4–11.3)
NEUTS SEG NFR BLD AUTO: 90.9 % (ref 38.7–80)
PLATELET # BLD AUTO: 185 X10E3/UL (ref 140–360)
POTASSIUM SERPL-SCNC: 5.9 MMOL/L (ref 3.5–5.1)
RBC # BLD AUTO: 3.03 X10E6/UL (ref 3.6–5.1)
SODIUM SERPL-SCNC: 137 MMOL/L (ref 136–145)

## 2018-04-17 RX ADMIN — CASTOR OIL AND BALSAM, PERU SCH GM: 788; 87 OINTMENT TOPICAL at 09:36

## 2018-04-17 RX ADMIN — Medication SCH MG: at 09:34

## 2018-04-17 RX ADMIN — OXYCODONE HYDROCHLORIDE AND ACETAMINOPHEN SCH MG: 500 TABLET ORAL at 09:35

## 2018-04-17 RX ADMIN — DOXYCYCLINE SCH MLS/HR: 100 INJECTION, POWDER, LYOPHILIZED, FOR SOLUTION INTRAVENOUS at 05:46

## 2018-04-17 RX ADMIN — IPRATROPIUM BROMIDE AND ALBUTEROL SULFATE SCH ML: .5; 2.5 SOLUTION RESPIRATORY (INHALATION) at 11:00

## 2018-04-17 RX ADMIN — INSULIN HUMAN SCH UNIT: 100 INJECTION, SOLUTION PARENTERAL at 22:37

## 2018-04-17 RX ADMIN — EZETIMIBE SCH MG: 10 TABLET ORAL at 09:35

## 2018-04-17 RX ADMIN — Medication SCH MG: at 09:35

## 2018-04-17 RX ADMIN — IPRATROPIUM BROMIDE AND ALBUTEROL SULFATE SCH ML: .5; 2.5 SOLUTION RESPIRATORY (INHALATION) at 19:35

## 2018-04-17 RX ADMIN — METHYLPREDNISOLONE SODIUM SUCCINATE SCH MG: 125 INJECTION, POWDER, FOR SOLUTION INTRAMUSCULAR; INTRAVENOUS at 09:34

## 2018-04-17 RX ADMIN — IPRATROPIUM BROMIDE AND ALBUTEROL SULFATE SCH ML: .5; 2.5 SOLUTION RESPIRATORY (INHALATION) at 07:00

## 2018-04-17 RX ADMIN — APIXABAN SCH MG: 5 TABLET, FILM COATED ORAL at 17:39

## 2018-04-17 RX ADMIN — GUAIFENESIN AND DEXTROMETHORPHAN HYDROBROMIDE SCH EACH: 600; 30 TABLET, EXTENDED RELEASE ORAL at 00:13

## 2018-04-17 RX ADMIN — Medication SCH MG: at 17:39

## 2018-04-17 RX ADMIN — DOXYCYCLINE SCH MLS/HR: 100 INJECTION, POWDER, LYOPHILIZED, FOR SOLUTION INTRAVENOUS at 17:39

## 2018-04-17 RX ADMIN — FLUTICASONE PROPIONATE AND SALMETEROL SCH EA: 50; 250 POWDER RESPIRATORY (INHALATION) at 06:00

## 2018-04-17 RX ADMIN — FUROSEMIDE SCH MG: 40 TABLET ORAL at 09:34

## 2018-04-17 RX ADMIN — IPRATROPIUM BROMIDE AND ALBUTEROL SULFATE SCH ML: .5; 2.5 SOLUTION RESPIRATORY (INHALATION) at 13:50

## 2018-04-17 RX ADMIN — FUROSEMIDE SCH MG: 40 TABLET ORAL at 17:39

## 2018-04-17 RX ADMIN — PANTOPRAZOLE SODIUM SCH MG: 40 TABLET, DELAYED RELEASE ORAL at 17:30

## 2018-04-17 RX ADMIN — MUPIROCIN SCH GM: 20 OINTMENT TOPICAL at 17:39

## 2018-04-17 RX ADMIN — AMIODARONE HYDROCHLORIDE SCH MG: 200 TABLET ORAL at 09:34

## 2018-04-17 RX ADMIN — INSULIN HUMAN SCH UNIT: 100 INJECTION, SOLUTION PARENTERAL at 11:45

## 2018-04-17 RX ADMIN — IPRATROPIUM BROMIDE AND ALBUTEROL SULFATE SCH ML: .5; 2.5 SOLUTION RESPIRATORY (INHALATION) at 03:35

## 2018-04-17 RX ADMIN — PANTOPRAZOLE SODIUM SCH MG: 40 TABLET, DELAYED RELEASE ORAL at 08:30

## 2018-04-17 RX ADMIN — INSULIN HUMAN SCH UNIT: 100 INJECTION, SOLUTION PARENTERAL at 08:00

## 2018-04-17 RX ADMIN — HYDROCODONE BITARTRATE AND ACETAMINOPHEN PRN EA: 10; 325 TABLET ORAL at 07:55

## 2018-04-17 RX ADMIN — NYSTATIN AND TRIAMCINOLONE ACETONIDE SCH GM: 100000; 1 CREAM TOPICAL at 09:35

## 2018-04-17 RX ADMIN — GUAIFENESIN AND DEXTROMETHORPHAN HYDROBROMIDE SCH EACH: 600; 30 TABLET, EXTENDED RELEASE ORAL at 17:39

## 2018-04-17 RX ADMIN — INSULIN HUMAN SCH UNIT: 100 INJECTION, SOLUTION PARENTERAL at 17:00

## 2018-04-17 RX ADMIN — TIOTROPIUM BROMIDE SCH MCG: 18 CAPSULE ORAL; RESPIRATORY (INHALATION) at 06:00

## 2018-04-17 RX ADMIN — GUAIFENESIN AND DEXTROMETHORPHAN HYDROBROMIDE SCH EACH: 600; 30 TABLET, EXTENDED RELEASE ORAL at 12:09

## 2018-04-17 RX ADMIN — METHYLPREDNISOLONE SODIUM SUCCINATE SCH MG: 125 INJECTION, POWDER, FOR SOLUTION INTRAMUSCULAR; INTRAVENOUS at 22:20

## 2018-04-17 RX ADMIN — IPRATROPIUM BROMIDE AND ALBUTEROL SULFATE SCH ML: .5; 2.5 SOLUTION RESPIRATORY (INHALATION) at 23:20

## 2018-04-17 RX ADMIN — MUPIROCIN SCH GM: 20 OINTMENT TOPICAL at 09:35

## 2018-04-17 RX ADMIN — CASTOR OIL AND BALSAM, PERU SCH GM: 788; 87 OINTMENT TOPICAL at 17:39

## 2018-04-17 RX ADMIN — OXYCODONE HYDROCHLORIDE AND ACETAMINOPHEN SCH MG: 500 TABLET ORAL at 17:39

## 2018-04-17 RX ADMIN — APIXABAN SCH MG: 5 TABLET, FILM COATED ORAL at 09:34

## 2018-04-17 RX ADMIN — GUAIFENESIN AND DEXTROMETHORPHAN HYDROBROMIDE SCH EACH: 600; 30 TABLET, EXTENDED RELEASE ORAL at 05:46

## 2018-04-18 VITALS — DIASTOLIC BLOOD PRESSURE: 60 MMHG | SYSTOLIC BLOOD PRESSURE: 132 MMHG

## 2018-04-18 VITALS — DIASTOLIC BLOOD PRESSURE: 62 MMHG | SYSTOLIC BLOOD PRESSURE: 123 MMHG

## 2018-04-18 VITALS — SYSTOLIC BLOOD PRESSURE: 141 MMHG | DIASTOLIC BLOOD PRESSURE: 65 MMHG

## 2018-04-18 VITALS — SYSTOLIC BLOOD PRESSURE: 132 MMHG | DIASTOLIC BLOOD PRESSURE: 60 MMHG

## 2018-04-18 VITALS — SYSTOLIC BLOOD PRESSURE: 132 MMHG | DIASTOLIC BLOOD PRESSURE: 69 MMHG

## 2018-04-18 VITALS — DIASTOLIC BLOOD PRESSURE: 64 MMHG | SYSTOLIC BLOOD PRESSURE: 133 MMHG

## 2018-04-18 VITALS — SYSTOLIC BLOOD PRESSURE: 129 MMHG | DIASTOLIC BLOOD PRESSURE: 62 MMHG

## 2018-04-18 LAB
ALBUMIN SERPL-MCNC: 3.5 G/DL (ref 3.5–5)
ALBUMIN/GLOB SERPL: 1 {RATIO} (ref 0.8–2)
ALP SERPL-CCNC: 81 IU/L (ref 40–150)
ALT SERPL-CCNC: 10 IU/L (ref 0–55)
ANION GAP SERPL CALC-SCNC: 15.2 MMOL/L (ref 8–16)
BASOPHILS # BLD AUTO: 0 10*3/UL (ref 0–0.1)
BASOPHILS NFR BLD AUTO: 0.2 % (ref 0–1)
BUN SERPL-MCNC: 52 MG/DL (ref 7–26)
BUN/CREAT SERPL: 30 (ref 6–25)
CALCIUM SERPL-MCNC: 10.1 MG/DL (ref 8.4–10.2)
CHLORIDE SERPL-SCNC: 94 MMOL/L (ref 98–107)
CO2 SERPL-SCNC: 36 MMOL/L (ref 22–29)
DEPRECATED NEUTROPHILS # BLD AUTO: 8.3 10*3/UL (ref 2.1–6.9)
DEPRECATED PHOSPHATE SERPL-MCNC: 3.8 MG/DL (ref 2.3–4.7)
EGFRCR SERPLBLD CKD-EPI 2021: 31 ML/MIN (ref 60–?)
EOSINOPHIL # BLD AUTO: 0 10*3/UL (ref 0–0.4)
EOSINOPHIL NFR BLD AUTO: 0 % (ref 0–6)
ERYTHROCYTE [DISTWIDTH] IN CORD BLOOD: 14.3 % (ref 11.7–14.4)
GLOBULIN PLAS-MCNC: 3.5 G/DL (ref 2.3–3.5)
GLUCOSE SERPLBLD-MCNC: 223 MG/DL (ref 74–118)
HCT VFR BLD AUTO: 31.7 % (ref 34.2–44.1)
HGB BLD-MCNC: 10.1 G/DL (ref 12–16)
LYMPHOCYTES # BLD: 0.6 10*3/UL (ref 1–3.2)
LYMPHOCYTES NFR BLD AUTO: 6 % (ref 18–39.1)
MAGNESIUM SERPL-MCNC: 2.2 MG/DL (ref 1.3–2.1)
MCH RBC QN AUTO: 32.8 PG (ref 28–32)
MCHC RBC AUTO-ENTMCNC: 31.9 G/DL (ref 31–35)
MCV RBC AUTO: 102.9 FL (ref 81–99)
MONOCYTES # BLD AUTO: 0.2 10*3/UL (ref 0.2–0.8)
MONOCYTES NFR BLD AUTO: 1.8 % (ref 4.4–11.3)
NEUTS SEG NFR BLD AUTO: 90.4 % (ref 38.7–80)
PLATELET # BLD AUTO: 197 X10E3/UL (ref 140–360)
POTASSIUM SERPL-SCNC: 5.2 MMOL/L (ref 3.5–5.1)
RBC # BLD AUTO: 3.08 X10E6/UL (ref 3.6–5.1)
SODIUM SERPL-SCNC: 140 MMOL/L (ref 136–145)

## 2018-04-18 RX ADMIN — FUROSEMIDE SCH MG: 40 TABLET ORAL at 08:25

## 2018-04-18 RX ADMIN — METHYLPREDNISOLONE SODIUM SUCCINATE SCH MG: 125 INJECTION, POWDER, FOR SOLUTION INTRAMUSCULAR; INTRAVENOUS at 08:25

## 2018-04-18 RX ADMIN — IPRATROPIUM BROMIDE AND ALBUTEROL SULFATE SCH ML: .5; 2.5 SOLUTION RESPIRATORY (INHALATION) at 02:45

## 2018-04-18 RX ADMIN — Medication SCH MG: at 08:27

## 2018-04-18 RX ADMIN — MUPIROCIN SCH GM: 20 OINTMENT TOPICAL at 08:27

## 2018-04-18 RX ADMIN — FLUTICASONE PROPIONATE AND SALMETEROL SCH EA: 50; 250 POWDER RESPIRATORY (INHALATION) at 06:10

## 2018-04-18 RX ADMIN — Medication SCH ML: at 19:39

## 2018-04-18 RX ADMIN — MUPIROCIN SCH GM: 20 OINTMENT TOPICAL at 17:31

## 2018-04-18 RX ADMIN — GUAIFENESIN AND DEXTROMETHORPHAN HYDROBROMIDE SCH EACH: 600; 30 TABLET, EXTENDED RELEASE ORAL at 17:31

## 2018-04-18 RX ADMIN — APIXABAN SCH MG: 5 TABLET, FILM COATED ORAL at 17:31

## 2018-04-18 RX ADMIN — PANTOPRAZOLE SODIUM SCH MG: 40 TABLET, DELAYED RELEASE ORAL at 08:25

## 2018-04-18 RX ADMIN — HYDROCODONE BITARTRATE AND ACETAMINOPHEN PRN EA: 10; 325 TABLET ORAL at 08:25

## 2018-04-18 RX ADMIN — NYSTATIN AND TRIAMCINOLONE ACETONIDE SCH GM: 100000; 1 CREAM TOPICAL at 08:27

## 2018-04-18 RX ADMIN — PANTOPRAZOLE SODIUM SCH MG: 40 TABLET, DELAYED RELEASE ORAL at 17:31

## 2018-04-18 RX ADMIN — INSULIN HUMAN SCH UNIT: 100 INJECTION, SOLUTION PARENTERAL at 11:29

## 2018-04-18 RX ADMIN — CASTOR OIL AND BALSAM, PERU SCH GM: 788; 87 OINTMENT TOPICAL at 08:27

## 2018-04-18 RX ADMIN — INSULIN HUMAN SCH UNIT: 100 INJECTION, SOLUTION PARENTERAL at 20:35

## 2018-04-18 RX ADMIN — INSULIN HUMAN SCH UNIT: 100 INJECTION, SOLUTION PARENTERAL at 08:08

## 2018-04-18 RX ADMIN — Medication SCH MG: at 08:25

## 2018-04-18 RX ADMIN — HYDROCODONE BITARTRATE AND ACETAMINOPHEN PRN EA: 10; 325 TABLET ORAL at 01:15

## 2018-04-18 RX ADMIN — METHYLPREDNISOLONE SODIUM SUCCINATE SCH MG: 40 INJECTION, POWDER, LYOPHILIZED, FOR SOLUTION INTRAMUSCULAR; INTRAVENOUS at 20:36

## 2018-04-18 RX ADMIN — GUAIFENESIN AND DEXTROMETHORPHAN HYDROBROMIDE SCH EACH: 600; 30 TABLET, EXTENDED RELEASE ORAL at 00:47

## 2018-04-18 RX ADMIN — FUROSEMIDE SCH MG: 40 TABLET ORAL at 17:31

## 2018-04-18 RX ADMIN — IPRATROPIUM BROMIDE AND ALBUTEROL SULFATE SCH ML: .5; 2.5 SOLUTION RESPIRATORY (INHALATION) at 10:40

## 2018-04-18 RX ADMIN — OXYCODONE HYDROCHLORIDE AND ACETAMINOPHEN SCH MG: 500 TABLET ORAL at 08:25

## 2018-04-18 RX ADMIN — OXYCODONE HYDROCHLORIDE AND ACETAMINOPHEN SCH MG: 500 TABLET ORAL at 17:31

## 2018-04-18 RX ADMIN — GUAIFENESIN AND DEXTROMETHORPHAN HYDROBROMIDE SCH EACH: 600; 30 TABLET, EXTENDED RELEASE ORAL at 05:28

## 2018-04-18 RX ADMIN — DOXYCYCLINE SCH MLS/HR: 100 INJECTION, POWDER, LYOPHILIZED, FOR SOLUTION INTRAVENOUS at 17:31

## 2018-04-18 RX ADMIN — GUAIFENESIN AND DEXTROMETHORPHAN HYDROBROMIDE SCH EACH: 600; 30 TABLET, EXTENDED RELEASE ORAL at 11:28

## 2018-04-18 RX ADMIN — LEVALBUTEROL HYDROCHLORIDE SCH MG: 0.63 SOLUTION RESPIRATORY (INHALATION) at 19:39

## 2018-04-18 RX ADMIN — DOXYCYCLINE SCH MLS/HR: 100 INJECTION, POWDER, LYOPHILIZED, FOR SOLUTION INTRAVENOUS at 05:28

## 2018-04-18 RX ADMIN — Medication SCH MG: at 17:31

## 2018-04-18 RX ADMIN — CASTOR OIL AND BALSAM, PERU SCH GM: 788; 87 OINTMENT TOPICAL at 17:31

## 2018-04-18 RX ADMIN — APIXABAN SCH MG: 5 TABLET, FILM COATED ORAL at 08:25

## 2018-04-18 RX ADMIN — TIOTROPIUM BROMIDE SCH MCG: 18 CAPSULE ORAL; RESPIRATORY (INHALATION) at 06:20

## 2018-04-18 RX ADMIN — AMIODARONE HYDROCHLORIDE SCH MG: 200 TABLET ORAL at 08:25

## 2018-04-18 RX ADMIN — EZETIMIBE SCH MG: 10 TABLET ORAL at 08:25

## 2018-04-18 RX ADMIN — INSULIN HUMAN SCH UNIT: 100 INJECTION, SOLUTION PARENTERAL at 16:22

## 2018-04-18 RX ADMIN — IPRATROPIUM BROMIDE AND ALBUTEROL SULFATE SCH ML: .5; 2.5 SOLUTION RESPIRATORY (INHALATION) at 06:30

## 2018-04-19 VITALS — DIASTOLIC BLOOD PRESSURE: 61 MMHG | SYSTOLIC BLOOD PRESSURE: 139 MMHG

## 2018-04-19 VITALS — SYSTOLIC BLOOD PRESSURE: 144 MMHG | DIASTOLIC BLOOD PRESSURE: 65 MMHG

## 2018-04-19 VITALS — SYSTOLIC BLOOD PRESSURE: 143 MMHG | DIASTOLIC BLOOD PRESSURE: 73 MMHG

## 2018-04-19 VITALS — SYSTOLIC BLOOD PRESSURE: 136 MMHG | DIASTOLIC BLOOD PRESSURE: 75 MMHG

## 2018-04-19 VITALS — SYSTOLIC BLOOD PRESSURE: 123 MMHG | DIASTOLIC BLOOD PRESSURE: 62 MMHG

## 2018-04-19 VITALS — DIASTOLIC BLOOD PRESSURE: 63 MMHG | SYSTOLIC BLOOD PRESSURE: 127 MMHG

## 2018-04-19 VITALS — SYSTOLIC BLOOD PRESSURE: 131 MMHG | DIASTOLIC BLOOD PRESSURE: 58 MMHG

## 2018-04-19 LAB
ANION GAP SERPL CALC-SCNC: 12.5 MMOL/L (ref 8–16)
BASE EXCESS BLDA CALC-SCNC: 18 MMOL/L (ref -2–3)
BASOPHILS # BLD AUTO: 0 10*3/UL (ref 0–0.1)
BASOPHILS NFR BLD AUTO: 0.1 % (ref 0–1)
BUN SERPL-MCNC: 61 MG/DL (ref 7–26)
BUN/CREAT SERPL: 41 (ref 6–25)
CALCIUM SERPL-MCNC: 9.8 MG/DL (ref 8.4–10.2)
CHLORIDE SERPL-SCNC: 95 MMOL/L (ref 98–107)
CO2 SERPL-SCNC: 40 MMOL/L (ref 22–29)
DEPRECATED NEUTROPHILS # BLD AUTO: 7.2 10*3/UL (ref 2.1–6.9)
DEPRECATED PHOSPHATE SERPL-MCNC: 3.5 MG/DL (ref 2.3–4.7)
EGFRCR SERPLBLD CKD-EPI 2021: 37 ML/MIN (ref 60–?)
EOSINOPHIL # BLD AUTO: 0 10*3/UL (ref 0–0.4)
EOSINOPHIL NFR BLD AUTO: 0 % (ref 0–6)
ERYTHROCYTE [DISTWIDTH] IN CORD BLOOD: 14.3 % (ref 11.7–14.4)
GLUCOSE SERPLBLD-MCNC: 194 MG/DL (ref 74–118)
HCO3 BLDA-SCNC: 42 MMOL/L (ref 23–28)
HCT VFR BLD AUTO: 31.8 % (ref 34.2–44.1)
HGB BLD-MCNC: 10.3 G/DL (ref 12–16)
LYMPHOCYTES # BLD: 0.7 10*3/UL (ref 1–3.2)
LYMPHOCYTES NFR BLD AUTO: 8.4 % (ref 18–39.1)
MAGNESIUM SERPL-MCNC: 2.1 MG/DL (ref 1.3–2.1)
MCH RBC QN AUTO: 32.9 PG (ref 28–32)
MCHC RBC AUTO-ENTMCNC: 32.4 G/DL (ref 31–35)
MCV RBC AUTO: 101.6 FL (ref 81–99)
MONOCYTES # BLD AUTO: 0.4 10*3/UL (ref 0.2–0.8)
MONOCYTES NFR BLD AUTO: 5.1 % (ref 4.4–11.3)
NEUTS SEG NFR BLD AUTO: 84.6 % (ref 38.7–80)
PCO2 BLDA: 55 MMHG (ref 41–51)
PCO2 BLDA: 62 MMHG (ref 80–105)
PH BLDA: 7.49 [PH] (ref 7.31–7.41)
PLATELET # BLD AUTO: 192 X10E3/UL (ref 140–360)
POTASSIUM SERPL-SCNC: 4.5 MMOL/L (ref 3.5–5.1)
RBC # BLD AUTO: 3.13 X10E6/UL (ref 3.6–5.1)
SAO2 % BLDA: 93 % (ref 95–98)
SODIUM SERPL-SCNC: 143 MMOL/L (ref 136–145)

## 2018-04-19 RX ADMIN — APIXABAN SCH MG: 5 TABLET, FILM COATED ORAL at 09:21

## 2018-04-19 RX ADMIN — Medication SCH MG: at 09:21

## 2018-04-19 RX ADMIN — METHYLPREDNISOLONE SODIUM SUCCINATE SCH MG: 40 INJECTION, POWDER, LYOPHILIZED, FOR SOLUTION INTRAMUSCULAR; INTRAVENOUS at 21:20

## 2018-04-19 RX ADMIN — Medication SCH ML: at 07:30

## 2018-04-19 RX ADMIN — GUAIFENESIN AND DEXTROMETHORPHAN HYDROBROMIDE SCH EACH: 600; 30 TABLET, EXTENDED RELEASE ORAL at 17:54

## 2018-04-19 RX ADMIN — FUROSEMIDE SCH MG: 40 TABLET ORAL at 09:21

## 2018-04-19 RX ADMIN — LEVALBUTEROL HYDROCHLORIDE SCH MG: 0.63 SOLUTION RESPIRATORY (INHALATION) at 07:00

## 2018-04-19 RX ADMIN — GUAIFENESIN AND DEXTROMETHORPHAN HYDROBROMIDE SCH EACH: 600; 30 TABLET, EXTENDED RELEASE ORAL at 23:37

## 2018-04-19 RX ADMIN — GUAIFENESIN AND DEXTROMETHORPHAN HYDROBROMIDE SCH EACH: 600; 30 TABLET, EXTENDED RELEASE ORAL at 05:26

## 2018-04-19 RX ADMIN — CASTOR OIL AND BALSAM, PERU SCH GM: 788; 87 OINTMENT TOPICAL at 09:22

## 2018-04-19 RX ADMIN — CASTOR OIL AND BALSAM, PERU SCH GM: 788; 87 OINTMENT TOPICAL at 17:54

## 2018-04-19 RX ADMIN — INSULIN HUMAN SCH UNIT: 100 INJECTION, SOLUTION PARENTERAL at 17:53

## 2018-04-19 RX ADMIN — Medication SCH MG: at 17:54

## 2018-04-19 RX ADMIN — AMIODARONE HYDROCHLORIDE SCH MG: 200 TABLET ORAL at 09:21

## 2018-04-19 RX ADMIN — LEVALBUTEROL HYDROCHLORIDE SCH MG: 0.63 SOLUTION RESPIRATORY (INHALATION) at 03:26

## 2018-04-19 RX ADMIN — PANTOPRAZOLE SODIUM SCH MG: 40 TABLET, DELAYED RELEASE ORAL at 08:00

## 2018-04-19 RX ADMIN — INSULIN HUMAN SCH UNIT: 100 INJECTION, SOLUTION PARENTERAL at 11:58

## 2018-04-19 RX ADMIN — OXYCODONE HYDROCHLORIDE AND ACETAMINOPHEN SCH MG: 500 TABLET ORAL at 09:21

## 2018-04-19 RX ADMIN — Medication SCH ML: at 03:26

## 2018-04-19 RX ADMIN — NYSTATIN AND TRIAMCINOLONE ACETONIDE SCH GM: 100000; 1 CREAM TOPICAL at 09:22

## 2018-04-19 RX ADMIN — GUAIFENESIN AND DEXTROMETHORPHAN HYDROBROMIDE SCH EACH: 600; 30 TABLET, EXTENDED RELEASE ORAL at 12:01

## 2018-04-19 RX ADMIN — PANTOPRAZOLE SODIUM SCH MG: 40 TABLET, DELAYED RELEASE ORAL at 17:53

## 2018-04-19 RX ADMIN — DOXYCYCLINE SCH MLS/HR: 100 INJECTION, POWDER, LYOPHILIZED, FOR SOLUTION INTRAVENOUS at 05:26

## 2018-04-19 RX ADMIN — GUAIFENESIN AND DEXTROMETHORPHAN HYDROBROMIDE SCH EACH: 600; 30 TABLET, EXTENDED RELEASE ORAL at 00:00

## 2018-04-19 RX ADMIN — MUPIROCIN SCH GM: 20 OINTMENT TOPICAL at 09:22

## 2018-04-19 RX ADMIN — FLUTICASONE PROPIONATE AND SALMETEROL SCH EA: 50; 250 POWDER RESPIRATORY (INHALATION) at 06:00

## 2018-04-19 RX ADMIN — Medication SCH ML: at 19:40

## 2018-04-19 RX ADMIN — INSULIN HUMAN SCH UNIT: 100 INJECTION, SOLUTION PARENTERAL at 21:35

## 2018-04-19 RX ADMIN — HYDROCODONE BITARTRATE AND ACETAMINOPHEN PRN EA: 10; 325 TABLET ORAL at 05:15

## 2018-04-19 RX ADMIN — TIOTROPIUM BROMIDE SCH MCG: 18 CAPSULE ORAL; RESPIRATORY (INHALATION) at 06:00

## 2018-04-19 RX ADMIN — LEVALBUTEROL HYDROCHLORIDE SCH MG: 0.63 SOLUTION RESPIRATORY (INHALATION) at 11:27

## 2018-04-19 RX ADMIN — INSULIN HUMAN SCH UNIT: 100 INJECTION, SOLUTION PARENTERAL at 07:30

## 2018-04-19 RX ADMIN — APIXABAN SCH MG: 5 TABLET, FILM COATED ORAL at 17:53

## 2018-04-19 RX ADMIN — METHYLPREDNISOLONE SODIUM SUCCINATE SCH MG: 40 INJECTION, POWDER, LYOPHILIZED, FOR SOLUTION INTRAMUSCULAR; INTRAVENOUS at 08:30

## 2018-04-19 RX ADMIN — Medication SCH ML: at 11:28

## 2018-04-19 RX ADMIN — HYDROCODONE BITARTRATE AND ACETAMINOPHEN PRN EA: 10; 325 TABLET ORAL at 12:03

## 2018-04-19 RX ADMIN — LEVALBUTEROL HYDROCHLORIDE SCH MG: 0.63 SOLUTION RESPIRATORY (INHALATION) at 19:38

## 2018-04-19 RX ADMIN — EZETIMIBE SCH MG: 10 TABLET ORAL at 09:21

## 2018-04-19 RX ADMIN — FUROSEMIDE SCH MG: 40 TABLET ORAL at 17:53

## 2018-04-19 RX ADMIN — DOXYCYCLINE SCH MLS/HR: 100 INJECTION, POWDER, LYOPHILIZED, FOR SOLUTION INTRAVENOUS at 17:54

## 2018-04-19 RX ADMIN — MUPIROCIN SCH GM: 20 OINTMENT TOPICAL at 17:54

## 2018-04-19 RX ADMIN — OXYCODONE HYDROCHLORIDE AND ACETAMINOPHEN SCH MG: 500 TABLET ORAL at 17:54

## 2018-04-19 NOTE — CONSULTATION
DATE OF CONSULTATION:  April 19, 2018 



PULMONARY CONSULTATION



A charming but unfortunate 57-year-old woman with a history of diastolic 

heart failure, intermittent atrial fibrillation admitted with wheezing and 

shortness of breath.  Said to have a low saturation at night according to 

the history and physical.  She has a history of gastroesophageal reflux, 

history of intermittent atrial fibrillation, depression and _______, 

history of diabetes and hypertension, chronic kidney disease, morbid 

obesity, obstructive sleep apnea.  She has been titrated in the past.  

Those records are currently unavailable.  They may be in the old hospital 

record.  To my recollection, CPAP was recommended.  She is now requesting a 

BiPAP.  



PHYSICAL EXAMINATION 

GENERAL:  A morbidly obese white female in no acute distress.  Near 

baseline.  Mild kyphosis.

VITALS:  Temperature 98.3, pulse 101, respirations 20, blood pressure 

139/61. 

LUNGS:  Diminished breath sounds bilaterally.  

HEART:  Regular rhythm.

ABDOMEN:  Nontender.

EXTREMITIES:  Trace edema.



The patient plans to bring in her machine to see if it can be adjusted to 

BiPAP mode.  Chest x-ray suggests bilateral infiltrates consistent with 

diastolic heart failure.  Will continue bronchodilators due to smoking 

history.  Request spirometry.  Consider followup CPAP trial.  Attempt to 

obtain BiPAP on a compassionate basis in view of her chronic respiratory 

failure.  The pH is 7.49, pCO2 55, pO2 62 on 50% oxygen according to the 

most recent report.  Consider tapering steroids.  Dose of Diamox.  She is 

currently on doxycycline for therapeutic for possible atypical pneumonia.  





Thank you for this kind referral. 

 







DD:  04/19/2018 17:50

DT:  04/19/2018 18:12

Job#:  [id number]

## 2018-04-20 VITALS — DIASTOLIC BLOOD PRESSURE: 57 MMHG | SYSTOLIC BLOOD PRESSURE: 116 MMHG

## 2018-04-20 VITALS — SYSTOLIC BLOOD PRESSURE: 141 MMHG | DIASTOLIC BLOOD PRESSURE: 65 MMHG

## 2018-04-20 VITALS — SYSTOLIC BLOOD PRESSURE: 134 MMHG | DIASTOLIC BLOOD PRESSURE: 63 MMHG

## 2018-04-20 VITALS — DIASTOLIC BLOOD PRESSURE: 68 MMHG | SYSTOLIC BLOOD PRESSURE: 150 MMHG

## 2018-04-20 VITALS — DIASTOLIC BLOOD PRESSURE: 63 MMHG | SYSTOLIC BLOOD PRESSURE: 134 MMHG

## 2018-04-20 VITALS — DIASTOLIC BLOOD PRESSURE: 60 MMHG | SYSTOLIC BLOOD PRESSURE: 125 MMHG

## 2018-04-20 VITALS — SYSTOLIC BLOOD PRESSURE: 120 MMHG | DIASTOLIC BLOOD PRESSURE: 58 MMHG

## 2018-04-20 LAB
ANION GAP SERPL CALC-SCNC: 14.7 MMOL/L (ref 8–16)
BASOPHILS # BLD AUTO: 0 10*3/UL (ref 0–0.1)
BASOPHILS NFR BLD AUTO: 0.2 % (ref 0–1)
BUN SERPL-MCNC: 70 MG/DL (ref 7–26)
BUN/CREAT SERPL: 51 (ref 6–25)
CALCIUM SERPL-MCNC: 10.1 MG/DL (ref 8.4–10.2)
CHLORIDE SERPL-SCNC: 93 MMOL/L (ref 98–107)
CO2 SERPL-SCNC: 40 MMOL/L (ref 22–29)
DEPRECATED NEUTROPHILS # BLD AUTO: 7.4 10*3/UL (ref 2.1–6.9)
EGFRCR SERPLBLD CKD-EPI 2021: 39 ML/MIN (ref 60–?)
EOSINOPHIL # BLD AUTO: 0 10*3/UL (ref 0–0.4)
EOSINOPHIL NFR BLD AUTO: 0 % (ref 0–6)
ERYTHROCYTE [DISTWIDTH] IN CORD BLOOD: 14 % (ref 11.7–14.4)
GLUCOSE SERPLBLD-MCNC: 139 MG/DL (ref 74–118)
HCT VFR BLD AUTO: 33.8 % (ref 34.2–44.1)
HGB BLD-MCNC: 10.7 G/DL (ref 12–16)
LYMPHOCYTES # BLD: 1.2 10*3/UL (ref 1–3.2)
LYMPHOCYTES NFR BLD AUTO: 12.9 % (ref 18–39.1)
MAGNESIUM SERPL-MCNC: 2.2 MG/DL (ref 1.3–2.1)
MCH RBC QN AUTO: 32.4 PG (ref 28–32)
MCHC RBC AUTO-ENTMCNC: 31.7 G/DL (ref 31–35)
MCV RBC AUTO: 102.4 FL (ref 81–99)
MONOCYTES # BLD AUTO: 0.7 10*3/UL (ref 0.2–0.8)
MONOCYTES NFR BLD AUTO: 7.1 % (ref 4.4–11.3)
NEUTS SEG NFR BLD AUTO: 77.8 % (ref 38.7–80)
PLATELET # BLD AUTO: 217 X10E3/UL (ref 140–360)
POTASSIUM SERPL-SCNC: 4.7 MMOL/L (ref 3.5–5.1)
RBC # BLD AUTO: 3.3 X10E6/UL (ref 3.6–5.1)
SODIUM SERPL-SCNC: 143 MMOL/L (ref 136–145)

## 2018-04-20 RX ADMIN — NYSTATIN AND TRIAMCINOLONE ACETONIDE SCH GM: 100000; 1 CREAM TOPICAL at 10:13

## 2018-04-20 RX ADMIN — FUROSEMIDE SCH MG: 40 TABLET ORAL at 16:53

## 2018-04-20 RX ADMIN — Medication SCH ML: at 01:57

## 2018-04-20 RX ADMIN — GUAIFENESIN AND DEXTROMETHORPHAN HYDROBROMIDE SCH EACH: 600; 30 TABLET, EXTENDED RELEASE ORAL at 07:05

## 2018-04-20 RX ADMIN — APIXABAN SCH MG: 5 TABLET, FILM COATED ORAL at 09:41

## 2018-04-20 RX ADMIN — MUPIROCIN SCH GM: 20 OINTMENT TOPICAL at 10:13

## 2018-04-20 RX ADMIN — OXYCODONE HYDROCHLORIDE AND ACETAMINOPHEN SCH MG: 500 TABLET ORAL at 10:13

## 2018-04-20 RX ADMIN — Medication SCH MG: at 09:42

## 2018-04-20 RX ADMIN — OXYCODONE HYDROCHLORIDE AND ACETAMINOPHEN SCH MG: 500 TABLET ORAL at 16:54

## 2018-04-20 RX ADMIN — PANTOPRAZOLE SODIUM SCH MG: 40 TABLET, DELAYED RELEASE ORAL at 07:30

## 2018-04-20 RX ADMIN — Medication SCH MG: at 16:54

## 2018-04-20 RX ADMIN — INSULIN LISPRO SCH UNIT: 100 INJECTION, SOLUTION INTRAVENOUS; SUBCUTANEOUS at 21:30

## 2018-04-20 RX ADMIN — FUROSEMIDE SCH MG: 40 TABLET ORAL at 09:41

## 2018-04-20 RX ADMIN — GUAIFENESIN AND DEXTROMETHORPHAN HYDROBROMIDE SCH EACH: 600; 30 TABLET, EXTENDED RELEASE ORAL at 17:02

## 2018-04-20 RX ADMIN — CASTOR OIL AND BALSAM, PERU SCH GM: 788; 87 OINTMENT TOPICAL at 10:13

## 2018-04-20 RX ADMIN — AMIODARONE HYDROCHLORIDE SCH MG: 200 TABLET ORAL at 09:41

## 2018-04-20 RX ADMIN — HYDROCODONE BITARTRATE AND ACETAMINOPHEN PRN EA: 10; 325 TABLET ORAL at 14:27

## 2018-04-20 RX ADMIN — TIOTROPIUM BROMIDE SCH MCG: 18 CAPSULE ORAL; RESPIRATORY (INHALATION) at 07:32

## 2018-04-20 RX ADMIN — LEVALBUTEROL HYDROCHLORIDE SCH MG: 0.63 SOLUTION RESPIRATORY (INHALATION) at 13:20

## 2018-04-20 RX ADMIN — Medication SCH ML: at 07:28

## 2018-04-20 RX ADMIN — GUAIFENESIN AND DEXTROMETHORPHAN HYDROBROMIDE SCH EACH: 600; 30 TABLET, EXTENDED RELEASE ORAL at 12:47

## 2018-04-20 RX ADMIN — Medication SCH MG: at 09:00

## 2018-04-20 RX ADMIN — DOXYCYCLINE SCH MLS/HR: 100 INJECTION, POWDER, LYOPHILIZED, FOR SOLUTION INTRAVENOUS at 17:04

## 2018-04-20 RX ADMIN — PANTOPRAZOLE SODIUM SCH MG: 40 TABLET, DELAYED RELEASE ORAL at 16:53

## 2018-04-20 RX ADMIN — EZETIMIBE SCH MG: 10 TABLET ORAL at 10:13

## 2018-04-20 RX ADMIN — INSULIN LISPRO SCH UNIT: 100 INJECTION, SOLUTION INTRAVENOUS; SUBCUTANEOUS at 11:30

## 2018-04-20 RX ADMIN — APIXABAN SCH MG: 5 TABLET, FILM COATED ORAL at 16:53

## 2018-04-20 RX ADMIN — LEVALBUTEROL HYDROCHLORIDE SCH MG: 0.63 SOLUTION RESPIRATORY (INHALATION) at 07:28

## 2018-04-20 RX ADMIN — LEVALBUTEROL HYDROCHLORIDE SCH MG: 0.63 SOLUTION RESPIRATORY (INHALATION) at 19:05

## 2018-04-20 RX ADMIN — Medication SCH MG: at 09:43

## 2018-04-20 RX ADMIN — MUPIROCIN SCH GM: 20 OINTMENT TOPICAL at 16:45

## 2018-04-20 RX ADMIN — Medication SCH ML: at 19:05

## 2018-04-20 RX ADMIN — INSULIN LISPRO SCH UNIT: 100 INJECTION, SOLUTION INTRAVENOUS; SUBCUTANEOUS at 16:30

## 2018-04-20 RX ADMIN — Medication SCH ML: at 13:20

## 2018-04-20 RX ADMIN — DOXYCYCLINE SCH MLS/HR: 100 INJECTION, POWDER, LYOPHILIZED, FOR SOLUTION INTRAVENOUS at 05:15

## 2018-04-20 RX ADMIN — FLUTICASONE PROPIONATE AND SALMETEROL SCH EA: 50; 250 POWDER RESPIRATORY (INHALATION) at 07:32

## 2018-04-20 RX ADMIN — LEVALBUTEROL HYDROCHLORIDE SCH MG: 0.63 SOLUTION RESPIRATORY (INHALATION) at 01:57

## 2018-04-20 NOTE — DIAGNOSTIC IMAGING REPORT
PROCEDURE: X-RAY CHEST, TWO VIEWS

COMPARISON: 4/16/2018.

INDICATIONS: SHORT OF BREATH

 

FINDINGS:



Lungs are reasonably well inflated. Unchanged small bilateral effusions 

with bilateral lower lobe airspace disease, likely atelectasis. 

Interstitial pulmonary edema unchanged when accounting for differences 

in technique. Stable cardiomediastinal contour. No acute osseous 

abnormality.

 

CONCLUSION:

Cardiomegaly with interstitial pulmonary edema and small bilateral 

pleural effusions, similar in appearance to 4/16/2018 accounting for 

differences in technique. 

 

Dictated by:  Lupillo Wynne M.D. on 4/20/2018 at 9:51     

Electronically approved by:  Lupillo Wynne M.D. on 4/20/2018 at 9:51

## 2018-04-21 VITALS — SYSTOLIC BLOOD PRESSURE: 138 MMHG | DIASTOLIC BLOOD PRESSURE: 60 MMHG

## 2018-04-21 VITALS — DIASTOLIC BLOOD PRESSURE: 61 MMHG | SYSTOLIC BLOOD PRESSURE: 130 MMHG

## 2018-04-21 VITALS — SYSTOLIC BLOOD PRESSURE: 124 MMHG | DIASTOLIC BLOOD PRESSURE: 58 MMHG

## 2018-04-21 VITALS — DIASTOLIC BLOOD PRESSURE: 60 MMHG | SYSTOLIC BLOOD PRESSURE: 138 MMHG

## 2018-04-21 VITALS — DIASTOLIC BLOOD PRESSURE: 60 MMHG | SYSTOLIC BLOOD PRESSURE: 129 MMHG

## 2018-04-21 VITALS — DIASTOLIC BLOOD PRESSURE: 61 MMHG | SYSTOLIC BLOOD PRESSURE: 128 MMHG

## 2018-04-21 VITALS — DIASTOLIC BLOOD PRESSURE: 60 MMHG | SYSTOLIC BLOOD PRESSURE: 125 MMHG

## 2018-04-21 VITALS — DIASTOLIC BLOOD PRESSURE: 65 MMHG | SYSTOLIC BLOOD PRESSURE: 139 MMHG

## 2018-04-21 LAB
ANION GAP SERPL CALC-SCNC: 13.2 MMOL/L (ref 8–16)
ANISOCYTOSIS BLD QL SMEAR: (no result)
BASOPHILS # BLD AUTO: 0 10*3/UL (ref 0–0.1)
BASOPHILS NFR BLD AUTO: 0.2 % (ref 0–1)
BUN SERPL-MCNC: 80 MG/DL (ref 7–26)
BUN/CREAT SERPL: 55 (ref 6–25)
CALCIUM SERPL-MCNC: 10 MG/DL (ref 8.4–10.2)
CHLORIDE SERPL-SCNC: 94 MMOL/L (ref 98–107)
CO2 SERPL-SCNC: 38 MMOL/L (ref 22–29)
DEPRECATED NEUTROPHILS # BLD AUTO: 6.4 10*3/UL (ref 2.1–6.9)
EGFRCR SERPLBLD CKD-EPI 2021: 37 ML/MIN (ref 60–?)
EOSINOPHIL # BLD AUTO: 0.1 10*3/UL (ref 0–0.4)
EOSINOPHIL NFR BLD AUTO: 0.6 % (ref 0–6)
EOSINOPHIL NFR BLD MANUAL: 3 % (ref 0–7)
ERYTHROCYTE [DISTWIDTH] IN CORD BLOOD: 14.1 % (ref 11.7–14.4)
GLUCOSE SERPLBLD-MCNC: 124 MG/DL (ref 74–118)
HCT VFR BLD AUTO: 33.6 % (ref 34.2–44.1)
HGB BLD-MCNC: 10.7 G/DL (ref 12–16)
LYMPHOCYTES # BLD: 2.8 10*3/UL (ref 1–3.2)
LYMPHOCYTES NFR BLD AUTO: 26.9 % (ref 18–39.1)
LYMPHOCYTES NFR BLD MANUAL: 30 % (ref 19–48)
MAGNESIUM SERPL-MCNC: 2 MG/DL (ref 1.3–2.1)
MCH RBC QN AUTO: 32.4 PG (ref 28–32)
MCHC RBC AUTO-ENTMCNC: 31.8 G/DL (ref 31–35)
MCV RBC AUTO: 101.8 FL (ref 81–99)
METAMYELOCYTES NFR BLD MANUAL: 1 % (ref 0–0)
MONOCYTES # BLD AUTO: 0.9 10*3/UL (ref 0.2–0.8)
MONOCYTES NFR BLD AUTO: 8.2 % (ref 4.4–11.3)
MONOCYTES NFR BLD MANUAL: 7 % (ref 3.4–9)
NEUTS SEG NFR BLD AUTO: 61.7 % (ref 38.7–80)
NEUTS SEG NFR BLD MANUAL: 58 % (ref 40–74)
PLAT MORPH BLD: NORMAL
PLATELET # BLD AUTO: 190 X10E3/UL (ref 140–360)
PLATELET # BLD EST: ADEQUATE 10*3/UL
POIKILOCYTOSIS BLD QL SMEAR: SLIGHT
POTASSIUM SERPL-SCNC: 4.2 MMOL/L (ref 3.5–5.1)
RBC # BLD AUTO: 3.3 X10E6/UL (ref 3.6–5.1)
RBC MORPH BLD: NORMAL
SODIUM SERPL-SCNC: 141 MMOL/L (ref 136–145)
STOMATOCYTES BLD QL SMEAR: SLIGHT

## 2018-04-21 RX ADMIN — LEVALBUTEROL HYDROCHLORIDE SCH MG: 0.63 SOLUTION RESPIRATORY (INHALATION) at 14:00

## 2018-04-21 RX ADMIN — LEVALBUTEROL HYDROCHLORIDE SCH MG: 0.63 SOLUTION RESPIRATORY (INHALATION) at 19:00

## 2018-04-21 RX ADMIN — GUAIFENESIN AND DEXTROMETHORPHAN HYDROBROMIDE SCH EACH: 600; 30 TABLET, EXTENDED RELEASE ORAL at 00:40

## 2018-04-21 RX ADMIN — GUAIFENESIN AND DEXTROMETHORPHAN HYDROBROMIDE SCH EACH: 600; 30 TABLET, EXTENDED RELEASE ORAL at 23:18

## 2018-04-21 RX ADMIN — GUAIFENESIN AND DEXTROMETHORPHAN HYDROBROMIDE SCH EACH: 600; 30 TABLET, EXTENDED RELEASE ORAL at 12:00

## 2018-04-21 RX ADMIN — Medication SCH ML: at 00:20

## 2018-04-21 RX ADMIN — HYDROCODONE BITARTRATE AND ACETAMINOPHEN PRN EA: 10; 325 TABLET ORAL at 14:05

## 2018-04-21 RX ADMIN — DOXYCYCLINE SCH MLS/HR: 100 INJECTION, POWDER, LYOPHILIZED, FOR SOLUTION INTRAVENOUS at 17:48

## 2018-04-21 RX ADMIN — OXYCODONE HYDROCHLORIDE AND ACETAMINOPHEN SCH MG: 500 TABLET ORAL at 10:14

## 2018-04-21 RX ADMIN — MUPIROCIN SCH GM: 20 OINTMENT TOPICAL at 09:00

## 2018-04-21 RX ADMIN — Medication SCH MG: at 10:14

## 2018-04-21 RX ADMIN — INSULIN LISPRO SCH UNIT: 100 INJECTION, SOLUTION INTRAVENOUS; SUBCUTANEOUS at 20:45

## 2018-04-21 RX ADMIN — Medication SCH MG: at 17:00

## 2018-04-21 RX ADMIN — Medication SCH ML: at 07:00

## 2018-04-21 RX ADMIN — HYDROCODONE BITARTRATE AND ACETAMINOPHEN PRN EA: 10; 325 TABLET ORAL at 20:45

## 2018-04-21 RX ADMIN — PANTOPRAZOLE SODIUM SCH MG: 40 TABLET, DELAYED RELEASE ORAL at 17:00

## 2018-04-21 RX ADMIN — PANTOPRAZOLE SODIUM SCH MG: 40 TABLET, DELAYED RELEASE ORAL at 07:05

## 2018-04-21 RX ADMIN — LEVALBUTEROL HYDROCHLORIDE SCH MG: 0.63 SOLUTION RESPIRATORY (INHALATION) at 07:00

## 2018-04-21 RX ADMIN — FUROSEMIDE SCH MG: 40 TABLET ORAL at 10:14

## 2018-04-21 RX ADMIN — LEVALBUTEROL HYDROCHLORIDE SCH MG: 0.63 SOLUTION RESPIRATORY (INHALATION) at 00:20

## 2018-04-21 RX ADMIN — Medication SCH ML: at 19:00

## 2018-04-21 RX ADMIN — NYSTATIN AND TRIAMCINOLONE ACETONIDE SCH GM: 100000; 1 CREAM TOPICAL at 09:00

## 2018-04-21 RX ADMIN — DOXYCYCLINE SCH MLS/HR: 100 INJECTION, POWDER, LYOPHILIZED, FOR SOLUTION INTRAVENOUS at 05:41

## 2018-04-21 RX ADMIN — Medication SCH ML: at 14:00

## 2018-04-21 RX ADMIN — TIOTROPIUM BROMIDE SCH MCG: 18 CAPSULE ORAL; RESPIRATORY (INHALATION) at 11:23

## 2018-04-21 RX ADMIN — INSULIN LISPRO SCH UNIT: 100 INJECTION, SOLUTION INTRAVENOUS; SUBCUTANEOUS at 07:30

## 2018-04-21 RX ADMIN — EZETIMIBE SCH MG: 10 TABLET ORAL at 10:14

## 2018-04-21 RX ADMIN — FUROSEMIDE SCH MG: 40 TABLET ORAL at 17:00

## 2018-04-21 RX ADMIN — OXYCODONE HYDROCHLORIDE AND ACETAMINOPHEN SCH MG: 500 TABLET ORAL at 17:00

## 2018-04-21 RX ADMIN — FLUTICASONE PROPIONATE AND SALMETEROL SCH EA: 50; 250 POWDER RESPIRATORY (INHALATION) at 07:00

## 2018-04-21 RX ADMIN — MUPIROCIN SCH GM: 20 OINTMENT TOPICAL at 17:00

## 2018-04-21 RX ADMIN — GUAIFENESIN AND DEXTROMETHORPHAN HYDROBROMIDE SCH EACH: 600; 30 TABLET, EXTENDED RELEASE ORAL at 17:00

## 2018-04-21 RX ADMIN — INSULIN LISPRO SCH UNIT: 100 INJECTION, SOLUTION INTRAVENOUS; SUBCUTANEOUS at 11:30

## 2018-04-21 RX ADMIN — GUAIFENESIN AND DEXTROMETHORPHAN HYDROBROMIDE SCH EACH: 600; 30 TABLET, EXTENDED RELEASE ORAL at 05:41

## 2018-04-21 RX ADMIN — CASTOR OIL AND BALSAM, PERU SCH GM: 788; 87 OINTMENT TOPICAL at 09:00

## 2018-04-21 RX ADMIN — HYDROCODONE BITARTRATE AND ACETAMINOPHEN PRN EA: 10; 325 TABLET ORAL at 07:05

## 2018-04-21 RX ADMIN — APIXABAN SCH MG: 5 TABLET, FILM COATED ORAL at 10:14

## 2018-04-21 RX ADMIN — AMIODARONE HYDROCHLORIDE SCH MG: 200 TABLET ORAL at 10:14

## 2018-04-21 RX ADMIN — APIXABAN SCH MG: 5 TABLET, FILM COATED ORAL at 17:00

## 2018-04-21 RX ADMIN — INSULIN LISPRO SCH UNIT: 100 INJECTION, SOLUTION INTRAVENOUS; SUBCUTANEOUS at 16:30

## 2018-04-21 NOTE — PULMONARY FUNCTION TEST
DATE OF STUDY:  April 21, 2018 



Severe restrictive pattern.  Forced vital capacity 0.81 L, 26% of 

predicted.  FEV1 of 0.7 L, 29%.  FEV1/FVC ratio 86%.  FEF25/75 45%.  

Restrictive pattern.



 







DD:  04/21/2018 12:17

DT:  04/21/2018 14:29

Job#:  [id number]

## 2018-04-22 VITALS — DIASTOLIC BLOOD PRESSURE: 62 MMHG | SYSTOLIC BLOOD PRESSURE: 133 MMHG

## 2018-04-22 VITALS — SYSTOLIC BLOOD PRESSURE: 131 MMHG | DIASTOLIC BLOOD PRESSURE: 68 MMHG

## 2018-04-22 VITALS — DIASTOLIC BLOOD PRESSURE: 55 MMHG | SYSTOLIC BLOOD PRESSURE: 117 MMHG

## 2018-04-22 VITALS — DIASTOLIC BLOOD PRESSURE: 64 MMHG | SYSTOLIC BLOOD PRESSURE: 139 MMHG

## 2018-04-22 VITALS — SYSTOLIC BLOOD PRESSURE: 121 MMHG | DIASTOLIC BLOOD PRESSURE: 56 MMHG

## 2018-04-22 VITALS — DIASTOLIC BLOOD PRESSURE: 63 MMHG | SYSTOLIC BLOOD PRESSURE: 132 MMHG

## 2018-04-22 LAB
ANION GAP SERPL CALC-SCNC: 13.3 MMOL/L (ref 8–16)
BASOPHILS # BLD AUTO: 0 10*3/UL (ref 0–0.1)
BASOPHILS NFR BLD AUTO: 0.2 % (ref 0–1)
BUN SERPL-MCNC: 82 MG/DL (ref 7–26)
BUN/CREAT SERPL: 49 (ref 6–25)
CALCIUM SERPL-MCNC: 10.1 MG/DL (ref 8.4–10.2)
CHLORIDE SERPL-SCNC: 94 MMOL/L (ref 98–107)
CO2 SERPL-SCNC: 38 MMOL/L (ref 22–29)
DEPRECATED NEUTROPHILS # BLD AUTO: 8.1 10*3/UL (ref 2.1–6.9)
EGFRCR SERPLBLD CKD-EPI 2021: 32 ML/MIN (ref 60–?)
EOSINOPHIL # BLD AUTO: 0.1 10*3/UL (ref 0–0.4)
EOSINOPHIL NFR BLD AUTO: 1 % (ref 0–6)
EOSINOPHIL NFR BLD MANUAL: 5 % (ref 0–7)
ERYTHROCYTE [DISTWIDTH] IN CORD BLOOD: 13.9 % (ref 11.7–14.4)
GLUCOSE SERPLBLD-MCNC: 75 MG/DL (ref 74–118)
HCT VFR BLD AUTO: 32.5 % (ref 34.2–44.1)
HGB BLD-MCNC: 10.7 G/DL (ref 12–16)
LYMPHOCYTES # BLD: 2.4 10*3/UL (ref 1–3.2)
LYMPHOCYTES NFR BLD AUTO: 20.8 % (ref 18–39.1)
LYMPHOCYTES NFR BLD MANUAL: 26 % (ref 19–48)
MAGNESIUM SERPL-MCNC: 2 MG/DL (ref 1.3–2.1)
MCH RBC QN AUTO: 32.8 PG (ref 28–32)
MCHC RBC AUTO-ENTMCNC: 32.9 G/DL (ref 31–35)
MCV RBC AUTO: 99.7 FL (ref 81–99)
MONOCYTES # BLD AUTO: 0.7 10*3/UL (ref 0.2–0.8)
MONOCYTES NFR BLD AUTO: 6.1 % (ref 4.4–11.3)
MONOCYTES NFR BLD MANUAL: 2 % (ref 3.4–9)
NEUTS SEG NFR BLD AUTO: 69.8 % (ref 38.7–80)
NEUTS SEG NFR BLD MANUAL: 67 % (ref 40–74)
PLAT MORPH BLD: NORMAL
PLATELET # BLD AUTO: 190 X10E3/UL (ref 140–360)
PLATELET # BLD EST: ADEQUATE 10*3/UL
POTASSIUM SERPL-SCNC: 4.3 MMOL/L (ref 3.5–5.1)
RBC # BLD AUTO: 3.26 X10E6/UL (ref 3.6–5.1)
RBC MORPH BLD: NORMAL
SODIUM SERPL-SCNC: 141 MMOL/L (ref 136–145)

## 2018-04-22 RX ADMIN — HYDROCODONE BITARTRATE AND ACETAMINOPHEN PRN EA: 10; 325 TABLET ORAL at 23:25

## 2018-04-22 RX ADMIN — GUAIFENESIN AND DEXTROMETHORPHAN HYDROBROMIDE SCH EACH: 600; 30 TABLET, EXTENDED RELEASE ORAL at 17:11

## 2018-04-22 RX ADMIN — Medication SCH MG: at 08:59

## 2018-04-22 RX ADMIN — LEVALBUTEROL HYDROCHLORIDE SCH MG: 0.63 SOLUTION RESPIRATORY (INHALATION) at 07:00

## 2018-04-22 RX ADMIN — PANTOPRAZOLE SODIUM SCH MG: 40 TABLET, DELAYED RELEASE ORAL at 17:11

## 2018-04-22 RX ADMIN — EZETIMIBE SCH MG: 10 TABLET ORAL at 08:59

## 2018-04-22 RX ADMIN — INSULIN LISPRO SCH UNIT: 100 INJECTION, SOLUTION INTRAVENOUS; SUBCUTANEOUS at 07:30

## 2018-04-22 RX ADMIN — HYDROCODONE BITARTRATE AND ACETAMINOPHEN PRN EA: 10; 325 TABLET ORAL at 17:15

## 2018-04-22 RX ADMIN — INSULIN LISPRO SCH UNIT: 100 INJECTION, SOLUTION INTRAVENOUS; SUBCUTANEOUS at 21:01

## 2018-04-22 RX ADMIN — CASTOR OIL AND BALSAM, PERU SCH GM: 788; 87 OINTMENT TOPICAL at 08:59

## 2018-04-22 RX ADMIN — INSULIN LISPRO SCH UNIT: 100 INJECTION, SOLUTION INTRAVENOUS; SUBCUTANEOUS at 12:21

## 2018-04-22 RX ADMIN — FUROSEMIDE SCH MG: 40 TABLET ORAL at 08:59

## 2018-04-22 RX ADMIN — TIOTROPIUM BROMIDE SCH MCG: 18 CAPSULE ORAL; RESPIRATORY (INHALATION) at 06:00

## 2018-04-22 RX ADMIN — OXYCODONE HYDROCHLORIDE AND ACETAMINOPHEN SCH MG: 500 TABLET ORAL at 17:11

## 2018-04-22 RX ADMIN — GUAIFENESIN AND DEXTROMETHORPHAN HYDROBROMIDE SCH EACH: 600; 30 TABLET, EXTENDED RELEASE ORAL at 12:21

## 2018-04-22 RX ADMIN — PANTOPRAZOLE SODIUM SCH MG: 40 TABLET, DELAYED RELEASE ORAL at 08:59

## 2018-04-22 RX ADMIN — FUROSEMIDE SCH MG: 40 TABLET ORAL at 17:11

## 2018-04-22 RX ADMIN — Medication SCH ML: at 13:00

## 2018-04-22 RX ADMIN — AMIODARONE HYDROCHLORIDE SCH MG: 200 TABLET ORAL at 08:59

## 2018-04-22 RX ADMIN — Medication SCH MG: at 17:11

## 2018-04-22 RX ADMIN — APIXABAN SCH MG: 5 TABLET, FILM COATED ORAL at 08:59

## 2018-04-22 RX ADMIN — MUPIROCIN SCH GM: 20 OINTMENT TOPICAL at 17:11

## 2018-04-22 RX ADMIN — APIXABAN SCH MG: 5 TABLET, FILM COATED ORAL at 17:11

## 2018-04-22 RX ADMIN — Medication SCH ML: at 00:37

## 2018-04-22 RX ADMIN — Medication SCH ML: at 07:00

## 2018-04-22 RX ADMIN — DOXYCYCLINE SCH MLS/HR: 100 INJECTION, POWDER, LYOPHILIZED, FOR SOLUTION INTRAVENOUS at 17:11

## 2018-04-22 RX ADMIN — LEVALBUTEROL HYDROCHLORIDE SCH MG: 0.63 SOLUTION RESPIRATORY (INHALATION) at 00:37

## 2018-04-22 RX ADMIN — DOXYCYCLINE SCH MLS/HR: 100 INJECTION, POWDER, LYOPHILIZED, FOR SOLUTION INTRAVENOUS at 06:10

## 2018-04-22 RX ADMIN — INSULIN LISPRO SCH UNIT: 100 INJECTION, SOLUTION INTRAVENOUS; SUBCUTANEOUS at 17:11

## 2018-04-22 RX ADMIN — MUPIROCIN SCH GM: 20 OINTMENT TOPICAL at 08:59

## 2018-04-22 RX ADMIN — LEVALBUTEROL HYDROCHLORIDE SCH MG: 0.63 SOLUTION RESPIRATORY (INHALATION) at 20:10

## 2018-04-22 RX ADMIN — GUAIFENESIN AND DEXTROMETHORPHAN HYDROBROMIDE SCH EACH: 600; 30 TABLET, EXTENDED RELEASE ORAL at 23:25

## 2018-04-22 RX ADMIN — OXYCODONE HYDROCHLORIDE AND ACETAMINOPHEN SCH MG: 500 TABLET ORAL at 08:59

## 2018-04-22 RX ADMIN — HYDROCODONE BITARTRATE AND ACETAMINOPHEN PRN EA: 10; 325 TABLET ORAL at 06:11

## 2018-04-22 RX ADMIN — FLUTICASONE PROPIONATE AND SALMETEROL SCH EA: 50; 250 POWDER RESPIRATORY (INHALATION) at 06:00

## 2018-04-22 RX ADMIN — GUAIFENESIN AND DEXTROMETHORPHAN HYDROBROMIDE SCH EACH: 600; 30 TABLET, EXTENDED RELEASE ORAL at 06:10

## 2018-04-22 RX ADMIN — LEVALBUTEROL HYDROCHLORIDE SCH MG: 0.63 SOLUTION RESPIRATORY (INHALATION) at 13:00

## 2018-04-22 RX ADMIN — METHYLPREDNISOLONE SODIUM SUCCINATE SCH MG: 40 INJECTION, POWDER, LYOPHILIZED, FOR SOLUTION INTRAMUSCULAR; INTRAVENOUS at 08:59

## 2018-04-22 RX ADMIN — Medication SCH ML: at 20:10

## 2018-04-22 RX ADMIN — NYSTATIN AND TRIAMCINOLONE ACETONIDE SCH GM: 100000; 1 CREAM TOPICAL at 08:59

## 2018-04-23 VITALS — SYSTOLIC BLOOD PRESSURE: 128 MMHG | DIASTOLIC BLOOD PRESSURE: 60 MMHG

## 2018-04-23 VITALS — SYSTOLIC BLOOD PRESSURE: 116 MMHG | DIASTOLIC BLOOD PRESSURE: 59 MMHG

## 2018-04-23 VITALS — SYSTOLIC BLOOD PRESSURE: 112 MMHG | DIASTOLIC BLOOD PRESSURE: 56 MMHG

## 2018-04-23 VITALS — SYSTOLIC BLOOD PRESSURE: 115 MMHG | DIASTOLIC BLOOD PRESSURE: 61 MMHG

## 2018-04-23 LAB
ANION GAP SERPL CALC-SCNC: 14.7 MMOL/L (ref 8–16)
BASOPHILS # BLD AUTO: 0 10*3/UL (ref 0–0.1)
BASOPHILS NFR BLD AUTO: 0.2 % (ref 0–1)
BUN SERPL-MCNC: 91 MG/DL (ref 7–26)
BUN/CREAT SERPL: 55 (ref 6–25)
CALCIUM SERPL-MCNC: 9.8 MG/DL (ref 8.4–10.2)
CHLORIDE SERPL-SCNC: 93 MMOL/L (ref 98–107)
CO2 SERPL-SCNC: 35 MMOL/L (ref 22–29)
DEPRECATED NEUTROPHILS # BLD AUTO: 7.3 10*3/UL (ref 2.1–6.9)
EGFRCR SERPLBLD CKD-EPI 2021: 32 ML/MIN (ref 60–?)
EOSINOPHIL # BLD AUTO: 0.2 10*3/UL (ref 0–0.4)
EOSINOPHIL NFR BLD AUTO: 1.4 % (ref 0–6)
ERYTHROCYTE [DISTWIDTH] IN CORD BLOOD: 14 % (ref 11.7–14.4)
GLUCOSE SERPLBLD-MCNC: 70 MG/DL (ref 74–118)
HCT VFR BLD AUTO: 33.7 % (ref 34.2–44.1)
HGB BLD-MCNC: 10.7 G/DL (ref 12–16)
LYMPHOCYTES # BLD: 2.3 10*3/UL (ref 1–3.2)
LYMPHOCYTES NFR BLD AUTO: 22 % (ref 18–39.1)
MAGNESIUM SERPL-MCNC: 2.1 MG/DL (ref 1.3–2.1)
MCH RBC QN AUTO: 32.5 PG (ref 28–32)
MCHC RBC AUTO-ENTMCNC: 31.8 G/DL (ref 31–35)
MCV RBC AUTO: 102.4 FL (ref 81–99)
MONOCYTES # BLD AUTO: 0.7 10*3/UL (ref 0.2–0.8)
MONOCYTES NFR BLD AUTO: 6.2 % (ref 4.4–11.3)
NEUTS SEG NFR BLD AUTO: 68.9 % (ref 38.7–80)
PLATELET # BLD AUTO: 195 X10E3/UL (ref 140–360)
POTASSIUM SERPL-SCNC: 3.7 MMOL/L (ref 3.5–5.1)
RBC # BLD AUTO: 3.29 X10E6/UL (ref 3.6–5.1)
SODIUM SERPL-SCNC: 139 MMOL/L (ref 136–145)

## 2018-04-23 RX ADMIN — CASTOR OIL AND BALSAM, PERU SCH GM: 788; 87 OINTMENT TOPICAL at 08:54

## 2018-04-23 RX ADMIN — TIOTROPIUM BROMIDE SCH MCG: 18 CAPSULE ORAL; RESPIRATORY (INHALATION) at 07:32

## 2018-04-23 RX ADMIN — AMIODARONE HYDROCHLORIDE SCH MG: 200 TABLET ORAL at 08:53

## 2018-04-23 RX ADMIN — HYDROCODONE BITARTRATE AND ACETAMINOPHEN PRN EA: 10; 325 TABLET ORAL at 11:17

## 2018-04-23 RX ADMIN — PANTOPRAZOLE SODIUM SCH MG: 40 TABLET, DELAYED RELEASE ORAL at 08:53

## 2018-04-23 RX ADMIN — FLUTICASONE PROPIONATE AND SALMETEROL SCH EA: 50; 250 POWDER RESPIRATORY (INHALATION) at 07:30

## 2018-04-23 RX ADMIN — Medication SCH MG: at 08:53

## 2018-04-23 RX ADMIN — INSULIN LISPRO SCH UNIT: 100 INJECTION, SOLUTION INTRAVENOUS; SUBCUTANEOUS at 12:09

## 2018-04-23 RX ADMIN — GUAIFENESIN AND DEXTROMETHORPHAN HYDROBROMIDE SCH EACH: 600; 30 TABLET, EXTENDED RELEASE ORAL at 11:46

## 2018-04-23 RX ADMIN — GUAIFENESIN AND DEXTROMETHORPHAN HYDROBROMIDE SCH EACH: 600; 30 TABLET, EXTENDED RELEASE ORAL at 05:45

## 2018-04-23 RX ADMIN — Medication SCH ML: at 02:19

## 2018-04-23 RX ADMIN — HYDROCODONE BITARTRATE AND ACETAMINOPHEN PRN EA: 10; 325 TABLET ORAL at 05:45

## 2018-04-23 RX ADMIN — MUPIROCIN SCH GM: 20 OINTMENT TOPICAL at 08:54

## 2018-04-23 RX ADMIN — INSULIN LISPRO SCH UNIT: 100 INJECTION, SOLUTION INTRAVENOUS; SUBCUTANEOUS at 07:30

## 2018-04-23 RX ADMIN — Medication SCH ML: at 07:14

## 2018-04-23 RX ADMIN — DOXYCYCLINE SCH MLS/HR: 100 INJECTION, POWDER, LYOPHILIZED, FOR SOLUTION INTRAVENOUS at 05:45

## 2018-04-23 RX ADMIN — OXYCODONE HYDROCHLORIDE AND ACETAMINOPHEN SCH MG: 500 TABLET ORAL at 08:53

## 2018-04-23 RX ADMIN — FUROSEMIDE SCH MG: 40 TABLET ORAL at 08:53

## 2018-04-23 RX ADMIN — LEVALBUTEROL HYDROCHLORIDE SCH MG: 0.63 SOLUTION RESPIRATORY (INHALATION) at 01:15

## 2018-04-23 RX ADMIN — Medication SCH ML: at 13:44

## 2018-04-23 RX ADMIN — LEVALBUTEROL HYDROCHLORIDE SCH MG: 0.63 SOLUTION RESPIRATORY (INHALATION) at 13:44

## 2018-04-23 RX ADMIN — EZETIMIBE SCH MG: 10 TABLET ORAL at 08:53

## 2018-04-23 RX ADMIN — NYSTATIN AND TRIAMCINOLONE ACETONIDE SCH GM: 100000; 1 CREAM TOPICAL at 08:54

## 2018-04-23 RX ADMIN — APIXABAN SCH MG: 5 TABLET, FILM COATED ORAL at 08:53

## 2018-04-23 RX ADMIN — METHYLPREDNISOLONE SODIUM SUCCINATE SCH MG: 40 INJECTION, POWDER, LYOPHILIZED, FOR SOLUTION INTRAMUSCULAR; INTRAVENOUS at 08:53

## 2018-04-23 RX ADMIN — LEVALBUTEROL HYDROCHLORIDE SCH MG: 0.63 SOLUTION RESPIRATORY (INHALATION) at 07:14

## 2018-04-24 NOTE — PULMONARY FUNCTION TEST
DATE OF STUDY:  April 21, 2018 



A patient of Dr. Valladares.



Restrictive spirometry:  Very severe restrictive pattern.  Forced vital 

capacity 0.81 liters, 26% of predicted.  FEV1, 0.7 liters, 29% of 

predicted.  FEV1:FVC ratio 86%.  QQW85-30, 45%.  Arterial blood gases 

revealing increased AA gradient, significant hypoxia, chronic hypercapnic 

respiratory failure.  On 6 liters high-flow cannula, pH 7.49, pCO2 55, PaO2 

62.  There is also concomitant metabolic alkalosis.



 







DD:  04/24/2018 16:30

DT:  04/24/2018 16:53

Job#:  [id number]

## 2018-05-03 ENCOUNTER — HOSPITAL ENCOUNTER (OUTPATIENT)
Dept: HOSPITAL 88 - SLEEP | Age: 58
End: 2018-05-03
Attending: INTERNAL MEDICINE
Payer: MEDICARE

## 2018-05-03 DIAGNOSIS — J96.10: ICD-10-CM

## 2018-05-03 DIAGNOSIS — G47.33: Primary | ICD-10-CM

## 2018-05-03 PROCEDURE — 95811 POLYSOM 6/>YRS CPAP 4/> PARM: CPT

## 2019-01-01 NOTE — POLYSOMNOGRAPHY
DATE OF STUDY:    



POLYSOMNOGRAM REPORT



A patient of Dr. Valladares. Patient with a history of daytime hypersomnolence, 

witnessed apneas, chronic hypoxia on home oxygen at 4 liters. This 

represents a diagnostic sleep study. During the diagnostic portion, the 

patient's oxygen saturation was maintained at 3 liters, which resulted in a 

saturation of 88%. The diagnostic portion of the study lasted 151 minutes. 

REM sleep was not attained, which may have reduced the severity of the 

findings. She was monitored using standard EEG lead montage, in addition to 

electrooculogram, submentalis EMG, anterior tibialis EMG, nasal and oral 

thermistors, rib cage and abdominal strain gauge monitor, pulse oximetry 

with respiratory inductance plethysmography and EKG monitoring. Sleep 

efficiency was 90%. However, the patient experienced 36 obstructive apneas, 

the longest in duration 31 seconds. In addition, there were 8 hypopneas. 

Respiratory disturbance index was 20, consistent with moderately severe 

obstructive sleep apnea. However, as REM sleep was not recorded, suspected 

that this underestimates the severity of her illness. Patient was then 

fitted with a ResMed AirFit F10 size small nasal mask. She was initially 

titrated at 5 cm of water pressure. Pressure was increased to 12 and then 

to a BiPAP of 13/9. Final pressure was 19/13 with a backup respiratory rate 

of 14 because of what was felt to be hypoventilation. Apneas and hypopneas 

were essentially eliminated. REM rebound sleep was also noted during the 

therapeutic portion of the study, suggesting adequacy of titration. It is 

recommended that the patient be given a home trial of BiPAP at a level of 

19/13 with a backup rate of 14. Heated humidifier should be added for the 

patient's comfort and improved compliance. O2 was bled in at a level of 1 

liter per minute, and it is recommended that oxygen be continued to be bled 

in. Lowest saturation recorded with oxygen supplementation was 81%. Without 

oxygen and without BiPAP her saturation fell as low as 52%. 

IMPRESSION:   Moderately severe obstructive sleep apnea, improved with 

bilateral positive 

airway pressure at a level of 19/13 with O2 bleed. It is recommended this 

be continued at home, a heated humidifier be added to improve patient 

compliance.











DD:  05/11/2018 14:52

DT:  05/11/2018 18:52

Job#:  [id number]
Statement Selected

## 2019-06-24 ENCOUNTER — HOSPITAL ENCOUNTER (INPATIENT)
Dept: HOSPITAL 88 - ER | Age: 59
LOS: 7 days | Discharge: HOME HEALTH | DRG: 208 | End: 2019-07-01
Attending: INTERNAL MEDICINE | Admitting: INTERNAL MEDICINE
Payer: MEDICARE

## 2019-06-24 VITALS — DIASTOLIC BLOOD PRESSURE: 70 MMHG | SYSTOLIC BLOOD PRESSURE: 145 MMHG

## 2019-06-24 VITALS — SYSTOLIC BLOOD PRESSURE: 149 MMHG | DIASTOLIC BLOOD PRESSURE: 70 MMHG

## 2019-06-24 VITALS — SYSTOLIC BLOOD PRESSURE: 138 MMHG | DIASTOLIC BLOOD PRESSURE: 85 MMHG

## 2019-06-24 VITALS — DIASTOLIC BLOOD PRESSURE: 68 MMHG | SYSTOLIC BLOOD PRESSURE: 145 MMHG

## 2019-06-24 VITALS — WEIGHT: 257 LBS | HEIGHT: 63 IN | BODY MASS INDEX: 45.54 KG/M2

## 2019-06-24 VITALS — SYSTOLIC BLOOD PRESSURE: 140 MMHG | DIASTOLIC BLOOD PRESSURE: 70 MMHG

## 2019-06-24 DIAGNOSIS — J96.22: Primary | ICD-10-CM

## 2019-06-24 DIAGNOSIS — J44.0: ICD-10-CM

## 2019-06-24 DIAGNOSIS — Z79.4: ICD-10-CM

## 2019-06-24 DIAGNOSIS — Z95.5: ICD-10-CM

## 2019-06-24 DIAGNOSIS — E66.01: ICD-10-CM

## 2019-06-24 DIAGNOSIS — N17.9: ICD-10-CM

## 2019-06-24 DIAGNOSIS — Z87.01: ICD-10-CM

## 2019-06-24 DIAGNOSIS — E87.6: ICD-10-CM

## 2019-06-24 DIAGNOSIS — I50.33: ICD-10-CM

## 2019-06-24 DIAGNOSIS — Z79.01: ICD-10-CM

## 2019-06-24 DIAGNOSIS — I25.10: ICD-10-CM

## 2019-06-24 DIAGNOSIS — J44.1: ICD-10-CM

## 2019-06-24 DIAGNOSIS — I48.0: ICD-10-CM

## 2019-06-24 DIAGNOSIS — E11.22: ICD-10-CM

## 2019-06-24 DIAGNOSIS — E66.2: ICD-10-CM

## 2019-06-24 DIAGNOSIS — J18.9: ICD-10-CM

## 2019-06-24 DIAGNOSIS — I11.0: ICD-10-CM

## 2019-06-24 DIAGNOSIS — N18.3: ICD-10-CM

## 2019-06-24 DIAGNOSIS — I13.0: ICD-10-CM

## 2019-06-24 LAB
ALBUMIN SERPL-MCNC: 3.8 G/DL (ref 3.5–5)
ALBUMIN/GLOB SERPL: 1 {RATIO} (ref 0.8–2)
ALP SERPL-CCNC: 130 IU/L (ref 40–150)
ALT SERPL-CCNC: 30 IU/L (ref 0–55)
ANION GAP SERPL CALC-SCNC: 19.2 MMOL/L (ref 8–16)
BACTERIA URNS QL MICRO: (no result) /HPF
BASE EXCESS BLDA CALC-SCNC: -1 MMOL/L (ref -2–3)
BASE EXCESS BLDA CALC-SCNC: -1 MMOL/L (ref -2–3)
BASE EXCESS BLDA CALC-SCNC: -3 MMOL/L (ref -2–3)
BASOPHILS # BLD AUTO: 0.1 10*3/UL (ref 0–0.1)
BASOPHILS NFR BLD AUTO: 0.5 % (ref 0–1)
BILIRUB UR QL: NEGATIVE
BNP BLD-MCNC: 1318.1 PG/ML (ref 0–100)
BUN SERPL-MCNC: 63 MG/DL (ref 7–26)
BUN/CREAT SERPL: 24 (ref 6–25)
CALCIUM SERPL-MCNC: 9.3 MG/DL (ref 8.4–10.2)
CHLORIDE SERPL-SCNC: 97 MMOL/L (ref 98–107)
CHOLEST SERPL-MCNC: 211 MD/DL (ref 0–199)
CHOLEST/HDLC SERPL: 2.9 {RATIO} (ref 3–3.6)
CK MB SERPL-MCNC: 11.3 NG/ML (ref 0–5)
CK MB SERPL-MCNC: 9.4 NG/ML (ref 0–5)
CK SERPL-CCNC: 120 IU/L (ref 29–168)
CK SERPL-CCNC: 164 IU/L (ref 29–168)
CLARITY UR: (no result)
CO2 SERPL-SCNC: 26 MMOL/L (ref 22–29)
COLOR UR: YELLOW
DEPRECATED APTT PLAS QN: 33.7 SECONDS (ref 23.8–35.5)
DEPRECATED INR PLAS: 1.13
DEPRECATED NEUTROPHILS # BLD AUTO: 8.7 10*3/UL (ref 2.1–6.9)
DEPRECATED RBC URNS MANUAL-ACNC: (no result) /HPF (ref 0–5)
EGFRCR SERPLBLD CKD-EPI 2021: 18 ML/MIN (ref 60–?)
EOSINOPHIL # BLD AUTO: 0 10*3/UL (ref 0–0.4)
EOSINOPHIL NFR BLD AUTO: 0.1 % (ref 0–6)
EPI CELLS URNS QL MICRO: (no result) /LPF
ERYTHROCYTE [DISTWIDTH] IN CORD BLOOD: 13.6 % (ref 11.7–14.4)
GLOBULIN PLAS-MCNC: 3.7 G/DL (ref 2.3–3.5)
GLUCOSE SERPLBLD-MCNC: 339 MG/DL (ref 74–118)
HCO3 BLDA-SCNC: 23 MMOL/L (ref 23–28)
HCO3 BLDA-SCNC: 27 MMOL/L (ref 23–28)
HCO3 BLDA-SCNC: 29 MMOL/L (ref 23–28)
HCT VFR BLD AUTO: 31.9 % (ref 34.2–44.1)
HDLC SERPL-MSCNC: 73 MG/DL (ref 40–60)
HGB BLD-MCNC: 10.1 G/DL (ref 12–16)
HYPOCHROMIA BLD QL SMEAR: SLIGHT
KETONES UR QL STRIP.AUTO: NEGATIVE
LDLC SERPL CALC-MCNC: 100 MG/DL (ref 60–130)
LEUKOCYTE ESTERASE UR QL STRIP.AUTO: NEGATIVE
LYMPHOCYTES # BLD: 1.2 10*3/UL (ref 1–3.2)
LYMPHOCYTES NFR BLD AUTO: 11.1 % (ref 18–39.1)
LYMPHOCYTES NFR BLD MANUAL: 14 % (ref 19–48)
MAGNESIUM SERPL-MCNC: 3 MG/DL (ref 1.3–2.1)
MCH RBC QN AUTO: 31.7 PG (ref 28–32)
MCHC RBC AUTO-ENTMCNC: 31.7 G/DL (ref 31–35)
MCV RBC AUTO: 100 FL (ref 81–99)
METAMYELOCYTES NFR BLD MANUAL: 1 % (ref 0–0)
MONOCYTES # BLD AUTO: 0.8 10*3/UL (ref 0.2–0.8)
MONOCYTES NFR BLD AUTO: 6.8 % (ref 4.4–11.3)
MONOCYTES NFR BLD MANUAL: 4 % (ref 3.4–9)
MYELOCYTES NFR BLD MANUAL: 1 % (ref 0–0)
NEUTS SEG NFR BLD AUTO: 79 % (ref 38.7–80)
NEUTS SEG NFR BLD MANUAL: 80 % (ref 40–74)
NITRITE UR QL STRIP.AUTO: NEGATIVE
PCO2 BLDA: 111 MMHG (ref 80–105)
PCO2 BLDA: 118 MMHG (ref 80–105)
PCO2 BLDA: 193 MMHG (ref 80–105)
PCO2 BLDA: 34 MMHG (ref 41–51)
PCO2 BLDA: 86 MMHG (ref 41–51)
PCO2 BLDA: 87 MMHG (ref 41–51)
PH BLDA: 7.09 [PH] (ref 7.31–7.41)
PH BLDA: 7.13 [PH] (ref 7.31–7.41)
PH BLDA: 7.44 [PH] (ref 7.31–7.41)
PLAT MORPH BLD: NORMAL
PLATELET # BLD AUTO: 209 X10E3/UL (ref 140–360)
PLATELET # BLD EST: ADEQUATE 10*3/UL
POTASSIUM SERPL-SCNC: 5.2 MMOL/L (ref 3.5–5.1)
PROT UR QL STRIP.AUTO: (no result)
PROTHROMBIN TIME: 15.1 SECONDS (ref 11.9–14.5)
RBC # BLD AUTO: 3.19 X10E6/UL (ref 3.6–5.1)
RBC MORPH BLD: NORMAL
SAO2 % BLDA: 100 % (ref 95–98)
SAO2 % BLDA: 96 % (ref 95–98)
SAO2 % BLDA: 96 % (ref 95–98)
SODIUM SERPL-SCNC: 137 MMOL/L (ref 136–145)
SP GR UR STRIP: >=1.03 (ref 1.01–1.02)
TRIGL SERPL-MCNC: 191 MG/DL (ref 0–149)
UROBILINOGEN UR STRIP-MCNC: 0.2 MG/DL (ref 0.2–1)
WBC #/AREA URNS HPF: (no result) /HPF (ref 0–5)

## 2019-06-24 PROCEDURE — 82270 OCCULT BLOOD FECES: CPT

## 2019-06-24 PROCEDURE — 83880 ASSAY OF NATRIURETIC PEPTIDE: CPT

## 2019-06-24 PROCEDURE — 83735 ASSAY OF MAGNESIUM: CPT

## 2019-06-24 PROCEDURE — 74018 RADEX ABDOMEN 1 VIEW: CPT

## 2019-06-24 PROCEDURE — 84484 ASSAY OF TROPONIN QUANT: CPT

## 2019-06-24 PROCEDURE — 84443 ASSAY THYROID STIM HORMONE: CPT

## 2019-06-24 PROCEDURE — 82948 REAGENT STRIP/BLOOD GLUCOSE: CPT

## 2019-06-24 PROCEDURE — 82746 ASSAY OF FOLIC ACID SERUM: CPT

## 2019-06-24 PROCEDURE — 80048 BASIC METABOLIC PNL TOTAL CA: CPT

## 2019-06-24 PROCEDURE — 80061 LIPID PANEL: CPT

## 2019-06-24 PROCEDURE — 82550 ASSAY OF CK (CPK): CPT

## 2019-06-24 PROCEDURE — 82150 ASSAY OF AMYLASE: CPT

## 2019-06-24 PROCEDURE — 93306 TTE W/DOPPLER COMPLETE: CPT

## 2019-06-24 PROCEDURE — 0BH18EZ INSERTION OF ENDOTRACHEAL AIRWAY INTO TRACHEA, VIA NATURAL OR ARTIFICIAL OPENING ENDOSCOPIC: ICD-10-PCS | Performed by: EMERGENCY MEDICINE

## 2019-06-24 PROCEDURE — 87086 URINE CULTURE/COLONY COUNT: CPT

## 2019-06-24 PROCEDURE — 97139 UNLISTED THERAPEUTIC PX: CPT

## 2019-06-24 PROCEDURE — 31500 INSERT EMERGENCY AIRWAY: CPT

## 2019-06-24 PROCEDURE — 85610 PROTHROMBIN TIME: CPT

## 2019-06-24 PROCEDURE — 76604 US EXAM CHEST: CPT

## 2019-06-24 PROCEDURE — 82805 BLOOD GASES W/O2 SATURATION: CPT

## 2019-06-24 PROCEDURE — 83036 HEMOGLOBIN GLYCOSYLATED A1C: CPT

## 2019-06-24 PROCEDURE — 36415 COLL VENOUS BLD VENIPUNCTURE: CPT

## 2019-06-24 PROCEDURE — 51700 IRRIGATION OF BLADDER: CPT

## 2019-06-24 PROCEDURE — 94660 CPAP INITIATION&MGMT: CPT

## 2019-06-24 PROCEDURE — 85045 AUTOMATED RETICULOCYTE COUNT: CPT

## 2019-06-24 PROCEDURE — 82728 ASSAY OF FERRITIN: CPT

## 2019-06-24 PROCEDURE — 87070 CULTURE OTHR SPECIMN AEROBIC: CPT

## 2019-06-24 PROCEDURE — 83690 ASSAY OF LIPASE: CPT

## 2019-06-24 PROCEDURE — 87205 SMEAR GRAM STAIN: CPT

## 2019-06-24 PROCEDURE — 84439 ASSAY OF FREE THYROXINE: CPT

## 2019-06-24 PROCEDURE — 82607 VITAMIN B-12: CPT

## 2019-06-24 PROCEDURE — 87186 SC STD MICRODIL/AGAR DIL: CPT

## 2019-06-24 PROCEDURE — 84466 ASSAY OF TRANSFERRIN: CPT

## 2019-06-24 PROCEDURE — 83605 ASSAY OF LACTIC ACID: CPT

## 2019-06-24 PROCEDURE — 93005 ELECTROCARDIOGRAM TRACING: CPT

## 2019-06-24 PROCEDURE — 36600 WITHDRAWAL OF ARTERIAL BLOOD: CPT

## 2019-06-24 PROCEDURE — 71046 X-RAY EXAM CHEST 2 VIEWS: CPT

## 2019-06-24 PROCEDURE — 80053 COMPREHEN METABOLIC PANEL: CPT

## 2019-06-24 PROCEDURE — 5A1945Z RESPIRATORY VENTILATION, 24-96 CONSECUTIVE HOURS: ICD-10-PCS | Performed by: INTERNAL MEDICINE

## 2019-06-24 PROCEDURE — 85730 THROMBOPLASTIN TIME PARTIAL: CPT

## 2019-06-24 PROCEDURE — 81001 URINALYSIS AUTO W/SCOPE: CPT

## 2019-06-24 PROCEDURE — 71045 X-RAY EXAM CHEST 1 VIEW: CPT

## 2019-06-24 PROCEDURE — 94664 DEMO&/EVAL PT USE INHALER: CPT

## 2019-06-24 PROCEDURE — 87040 BLOOD CULTURE FOR BACTERIA: CPT

## 2019-06-24 PROCEDURE — 82553 CREATINE MB FRACTION: CPT

## 2019-06-24 PROCEDURE — 99285 EMERGENCY DEPT VISIT HI MDM: CPT

## 2019-06-24 PROCEDURE — 94640 AIRWAY INHALATION TREATMENT: CPT

## 2019-06-24 PROCEDURE — 83540 ASSAY OF IRON: CPT

## 2019-06-24 PROCEDURE — 85025 COMPLETE CBC W/AUTO DIFF WBC: CPT

## 2019-06-24 PROCEDURE — 94003 VENT MGMT INPAT SUBQ DAY: CPT

## 2019-06-24 PROCEDURE — 94002 VENT MGMT INPAT INIT DAY: CPT

## 2019-06-24 RX ADMIN — INSULIN HUMAN SCH UNIT: 100 INJECTION, SOLUTION PARENTERAL at 21:39

## 2019-06-24 RX ADMIN — OXYCODONE HYDROCHLORIDE AND ACETAMINOPHEN SCH MG: 500 TABLET ORAL at 18:01

## 2019-06-24 RX ADMIN — APIXABAN SCH MG: 5 TABLET, FILM COATED ORAL at 17:00

## 2019-06-24 RX ADMIN — IPRATROPIUM BROMIDE AND ALBUTEROL SULFATE SCH ML: .5; 2.5 SOLUTION RESPIRATORY (INHALATION) at 19:35

## 2019-06-24 RX ADMIN — PROPOFOL PRN MLS/HR: 10 INJECTION, EMULSION INTRAVENOUS at 17:30

## 2019-06-24 RX ADMIN — ZINC SULFATE CAP 220 MG (50 MG ELEMENTAL ZN) SCH MG: 220 (50 ZN) CAP at 17:00

## 2019-06-24 RX ADMIN — Medication SCH MG: at 17:00

## 2019-06-24 RX ADMIN — FAMOTIDINE SCH MG: 10 INJECTION, SOLUTION INTRAVENOUS at 18:20

## 2019-06-24 RX ADMIN — PROPOFOL PRN MLS/HR: 10 INJECTION, EMULSION INTRAVENOUS at 20:48

## 2019-06-24 RX ADMIN — INSULIN GLARGINE SCH UNITS: 100 INJECTION, SOLUTION SUBCUTANEOUS at 21:00

## 2019-06-24 RX ADMIN — IPRATROPIUM BROMIDE AND ALBUTEROL SULFATE SCH ML: .5; 2.5 SOLUTION RESPIRATORY (INHALATION) at 22:55

## 2019-06-24 RX ADMIN — INSULIN HUMAN SCH UNIT: 100 INJECTION, SOLUTION PARENTERAL at 16:30

## 2019-06-24 RX ADMIN — BUMETANIDE SCH MG: 0.25 INJECTION INTRAMUSCULAR; INTRAVENOUS at 18:18

## 2019-06-24 RX ADMIN — PROPOFOL PRN MLS/HR: 10 INJECTION, EMULSION INTRAVENOUS at 15:00

## 2019-06-24 NOTE — DIAGNOSTIC IMAGING REPORT
Examination: Single AP view of the chest.



COMPARISON: 6/24/2019 at 1334

INDICATION: Post intubation

     

DISCUSSION:



Interval intubation. The endotracheal tube tip projects approximately 3 cm

above the carly. An enteric tube has also been placed, the tip of which

projects off the current radiograph, inferior to the left hemidiaphragm.



No appreciable interval change in the appearance of the heart or lungs compared

to the examination from 1334 hours. Marked cardiomegaly with interstitial and

alveolar edema and probable trace bilateral pleural effusions.



IMPRESSION:

 

Interval intubation and placement of an enteric tube, appropriately positioned

as above.



Stable marked enlargement of the cardiac silhouette with pulmonary edema and

suspected small bilateral pleural effusions.



Signed by: Dr. Lupillo Wynne M.D. on 6/24/2019 3:34 PM

## 2019-06-24 NOTE — CONSULTATION
DATE OF CONSULTATION:  06/24/2019

 

Pulmonary Critical Care Consultation 

 

CHIEF COMPLAINT:  Hypercapnic respiratory failure.

 

HISTORY OF PRESENT ILLNESS:  The patient is a 58-year-old woman.  She has a history of

obesity hypoventilation syndrome and sleep apnea.  She uses CPAP at home as well as

oxygen.  She required hospitalization with endotracheal intubation and mechanical

ventilation in the fall of 2017.  She was also readmitted to the hospital with acute

respiratory failure in April 2018. 

 

She has a history of prior cardiac stents as well as chronic diastolic heart failure and

chronic kidney disease stage 3.  She has been on amiodarone and Eliquis for atrial

fibrillation. 

 

Over the past 3-4 days, she has noted more dyspnea.  She did not complain of cough or

fevers.  She had no chest pain.  When she came to the Emergency Department, she was

found to have hypercapnia.  She was tried on BiPAP, but did not respond and she was

subsequently intubated. 

 

PAST SURGICAL HISTORY:  Status post cardiac stents 15 years ago.

 

PAST MEDICAL HISTORY:  

1. Obesity hypoventilation syndrome.

2. Chronic obstructive sleep apnea.

3. Chronic diastolic heart failure.

4. Paroxysmal atrial fibrillation.

5. Diabetes, requiring insulin at home.

 

ALLERGIES:  THE PATIENT REPORTS AN ALLERGY TO SULFA AS WELL AS PENICILLIN.  SHE ALSO

REPORTS AN ALLERGY TO NAPROSYN. 

 

FAMILY HISTORY:  Family history is significant for hypertension and diabetes.

 

SOCIAL HISTORY:  The patient quit smoking several years ago.  She is not a drinker.

 

REVIEW OF SYSTEMS:

No fever.  No headaches.  No neck pain.  She is not having any chest pain.  She has

minimal cough.  She does have some dyspnea.  She has no abdominal pain.  She has no

nausea or vomiting.  She has mild leg swelling. 

 

PHYSICAL EXAMINATION:

VITAL SIGNS:  The patient is afebrile.  The blood pressure is 91 and the pulse is

118/74.  She is currently on a mechanical ventilator with an assist-control mode of

ventilation set at a rate of 18. 

HEENT:  She has oral endotracheal tube. 

LYMPHATIC:  Shows no submandibular, cervical, or supraclavicular adenopathy. 

CARDIAC:  Reveals a regular rate and rhythm with a normal S1 and S2. 

LUNGS:  Auscultation of lungs reveals rhonchorous breath sounds bilaterally.  There is

no wheezing. 

ABDOMEN:  Soft, nontender.  There is no rebound or guarding. 

EXTREMITIES:  Show no leg edema or calf tenderness.  There is no cyanosis or clubbing. 

SKIN:  Shows no rashes. 

NEUROLOGICAL:  Shows no focal abnormalities.

RADIOGRAPHIC DATA:  Chest x-ray shows bilateral infiltrates as well as small bilateral

effusions suggestive of congestive heart failure. 

 

IMPRESSION:  

1. Acute-on-chronic hypercapnic respiratory failure.

2. Acute-on-chronic diastolic heart failure.

3. Paroxysmal atrial fibrillation.

4. Diabetes, requiring insulin.

5. Acute-on-chronic renal failure.

6. Hypertension.

 

PLAN:  

1. The patient will receive Lasix for diuresis.

2. Echocardiogram and Cardiology evaluation.

3. Repeat blood gas on arrival to the ICU and continue to wean ventilator as tolerated.

4. Stress dose steroids.

5. Control blood sugars.

6. I will transfer care to Dr. Meraz tomorrow.  He is for established pulmonologist.

 

 

 

 

______________________________

Lucas Roy MD

 

[facility]

D:  06/24/2019 16:25:13

T:  06/24/2019 22:31:37

Job #:  [id number]

## 2019-06-24 NOTE — NUR
UPPER AND LOWER DENTURES REMOVED FOR INTUBATION.  DENTURES PLACED IN BIO-BAG 
AND HAND DELIVERED TO DAUGHTER.

## 2019-06-24 NOTE — CONSULTATION
DATE OF CONSULTATION:  06/24/2019

 

Cardiology Consultation. 

 

CHIEF COMPLAINT:  The patient is a 58-year-old with shortness of breath.

 

HISTORY OF PRESENT ILLNESS:  The patient is a 58-year-old, who came back from a trip to

[place] and gradually became more and more short of breath.  The patient was taken to

the emergency room at [facility] and was subsequently intubated.  The

patient's chest x-ray did demonstrate pulmonary edema.  There was no report of chest

pain.  There was no fever.  No chills.  This patient did report the diuresis and

wheezing. 

 

PAST MEDICAL HISTORY:  Significant for:

1. Several previous episodes requiring intubation.

2. History of chronic obstructive pulmonary disease.

3. History of sleep apnea.

4. History of diastolic heart failure.

 

SOCIAL HISTORY:  The patient does smoke.  The patient lives with her family.

 

FAMILY HISTORY:  There is no known family history of heart disease.

 

PHYSICAL EXAMINATION:

GENERAL:  The patient is an intubated female. 

VITAL SIGNS:  Included temperature of 98.8, blood pressure of 140/80, pulse of 100. 

HEAD, EARS, EYES, NOSE, AND THROAT:  The patient's cranium was normocephalic and

atraumatic.  Extraocular muscles were intact.  Sclerae were anicteric.  Pupils were

equal, round, reactive to light.  There was no pallor or cyanosis of the oral mucosa.

There was no erythema or edema of the throat. 

NECK:  Supple.  No jugular venous distention. 

CHEST:  Demonstrated rhonchi and rales bilaterally. 

CARDIAC:  Demonstrated a normal S1 and S2 with a short 2/6 systolic murmur. 

ABDOMEN:  Demonstrated good bowel sounds.  No tenderness and no masses. 

EXTREMITIES:  1+ edema bilaterally. 

NEUROLOGIC:  The patient was intubated and sedated.  The patient was moving all

extremities. 

RADIOGRAPHS:  The patient's EKG demonstrated sinus tachycardia with nonspecific ST and

T-wave changes. 

 

IMPRESSION:  The patient is a 58-year-old, who is presenting with acute respiratory

failure requiring intubation.  The patient's presentation is consistent with acute

diastolic heart failure superimposed on chronic obstructive pulmonary disease and

hypoventilation syndrome. 

 

RECOMMENDATIONS:  Are as follows:

1. The patient will require an echocardiogram.

2. Cardiac enzymes will need to be sent.

3. Serial EKGs will need to be done.

4. The patient will need to be admitted to the ICU.

 

 

 

 

______________________________

MD TATYANA Jarvis/CESILIA

D:  06/24/2019 17:57:06

T:  06/24/2019 23:30:17

Job #:  [id number]

## 2019-06-24 NOTE — NUR
I WENT IN TO PLACE THE LANDAVERDE CATHETER AND THE ECHO TECH WAS AT BEDSIDE STARTING 
THE CARDIAC ECHO. I SPOKE WITH FAMILY AND GOT A LIST OF PTS CURRENT MEDS.

## 2019-06-24 NOTE — NUR
RECEIVED REPORT FROM NURSE ZACH TO ASSUME PTS CARE. PER HIS REPORT, PT ON 
BIPAP, EKG AND IV AND LABS DONE. RESP AT BEDSIDE DRAWING AN ABG. PT AWAKE AND 
ALERT. SPOW ON BIPAP %.

## 2019-06-24 NOTE — DIAGNOSTIC IMAGING REPORT
EXAMINATION:  CHEST SINGLE (PORTABLE)    



INDICATION:      Shortness of breath  

^ERMD ORDER

^[id number]

^1330

^Y 



COMPARISON:  4/16/2018

     

FINDINGS:

LINES/TUBES: None



LUNGS: Vascular congestion/edema and bibasilar atelectasis. This is worse when

compared with the prior exam



PLEURA: Likely right and left pleural effusions.



HEART AND MEDIASTINUM: Marked enlargement of the cardiomediastinal silhouette.



BONES AND SOFT TISSUES: No acute findings. 



IMPRESSION:

Findings most likely due to congestive heart failure worse when compared with

the prior exam. Follow-up imaging is indicated to document clearing.



Signed by: Dr. Myles Sutton M.D. on 6/24/2019 2:02 PM

## 2019-06-24 NOTE — NUR
REPORT GIVEN OVER THE PHONE TO KARIME IN ICU FOR ADMIT INTO ROOM 195. FAMILY 
AT BEDSIDE. UPDATED THEM ABOUT THE TRANSFER PROCESS

## 2019-06-24 NOTE — NUR
ECHO TECH JUST FINISHED WITH THE ECHO. MYSELF AND TAMEKA MUJICA RN PLACED A LANDAVERDE 
CATHETER BECAUSE PT IS VENTILATED AND SEDATED AND IS ON DIURETICS AND GOING TO 
ICU

## 2019-06-25 VITALS — SYSTOLIC BLOOD PRESSURE: 138 MMHG | DIASTOLIC BLOOD PRESSURE: 76 MMHG

## 2019-06-25 VITALS — SYSTOLIC BLOOD PRESSURE: 131 MMHG | DIASTOLIC BLOOD PRESSURE: 63 MMHG

## 2019-06-25 VITALS — DIASTOLIC BLOOD PRESSURE: 75 MMHG | SYSTOLIC BLOOD PRESSURE: 148 MMHG

## 2019-06-25 VITALS — SYSTOLIC BLOOD PRESSURE: 132 MMHG | DIASTOLIC BLOOD PRESSURE: 67 MMHG

## 2019-06-25 VITALS — SYSTOLIC BLOOD PRESSURE: 128 MMHG | DIASTOLIC BLOOD PRESSURE: 60 MMHG

## 2019-06-25 VITALS — SYSTOLIC BLOOD PRESSURE: 127 MMHG | DIASTOLIC BLOOD PRESSURE: 61 MMHG

## 2019-06-25 VITALS — DIASTOLIC BLOOD PRESSURE: 63 MMHG | SYSTOLIC BLOOD PRESSURE: 128 MMHG

## 2019-06-25 VITALS — SYSTOLIC BLOOD PRESSURE: 144 MMHG | DIASTOLIC BLOOD PRESSURE: 74 MMHG

## 2019-06-25 VITALS — SYSTOLIC BLOOD PRESSURE: 119 MMHG | DIASTOLIC BLOOD PRESSURE: 60 MMHG

## 2019-06-25 VITALS — SYSTOLIC BLOOD PRESSURE: 127 MMHG | DIASTOLIC BLOOD PRESSURE: 64 MMHG

## 2019-06-25 VITALS — SYSTOLIC BLOOD PRESSURE: 127 MMHG | DIASTOLIC BLOOD PRESSURE: 49 MMHG

## 2019-06-25 VITALS — DIASTOLIC BLOOD PRESSURE: 70 MMHG | SYSTOLIC BLOOD PRESSURE: 139 MMHG

## 2019-06-25 VITALS — SYSTOLIC BLOOD PRESSURE: 123 MMHG | DIASTOLIC BLOOD PRESSURE: 103 MMHG

## 2019-06-25 VITALS — DIASTOLIC BLOOD PRESSURE: 58 MMHG | SYSTOLIC BLOOD PRESSURE: 130 MMHG

## 2019-06-25 VITALS — DIASTOLIC BLOOD PRESSURE: 78 MMHG | SYSTOLIC BLOOD PRESSURE: 135 MMHG

## 2019-06-25 VITALS — DIASTOLIC BLOOD PRESSURE: 72 MMHG | SYSTOLIC BLOOD PRESSURE: 154 MMHG

## 2019-06-25 VITALS — DIASTOLIC BLOOD PRESSURE: 67 MMHG | SYSTOLIC BLOOD PRESSURE: 129 MMHG

## 2019-06-25 VITALS — DIASTOLIC BLOOD PRESSURE: 58 MMHG | SYSTOLIC BLOOD PRESSURE: 118 MMHG

## 2019-06-25 VITALS — DIASTOLIC BLOOD PRESSURE: 75 MMHG | SYSTOLIC BLOOD PRESSURE: 132 MMHG

## 2019-06-25 VITALS — SYSTOLIC BLOOD PRESSURE: 154 MMHG | DIASTOLIC BLOOD PRESSURE: 76 MMHG

## 2019-06-25 VITALS — DIASTOLIC BLOOD PRESSURE: 64 MMHG | SYSTOLIC BLOOD PRESSURE: 125 MMHG

## 2019-06-25 VITALS — DIASTOLIC BLOOD PRESSURE: 67 MMHG | SYSTOLIC BLOOD PRESSURE: 145 MMHG

## 2019-06-25 VITALS — SYSTOLIC BLOOD PRESSURE: 146 MMHG | DIASTOLIC BLOOD PRESSURE: 65 MMHG

## 2019-06-25 LAB
ALBUMIN SERPL-MCNC: 3.1 G/DL (ref 3.5–5)
ALBUMIN/GLOB SERPL: 0.9 {RATIO} (ref 0.8–2)
ALP SERPL-CCNC: 103 IU/L (ref 40–150)
ALT SERPL-CCNC: 24 IU/L (ref 0–55)
ANION GAP SERPL CALC-SCNC: 17.5 MMOL/L (ref 8–16)
BASOPHILS # BLD AUTO: 0 10*3/UL (ref 0–0.1)
BASOPHILS NFR BLD AUTO: 0.1 % (ref 0–1)
BUN SERPL-MCNC: 58 MG/DL (ref 7–26)
BUN/CREAT SERPL: 28 (ref 6–25)
CALCIUM SERPL-MCNC: 9 MG/DL (ref 8.4–10.2)
CHLORIDE SERPL-SCNC: 103 MMOL/L (ref 98–107)
CK MB SERPL-MCNC: 3.7 NG/ML (ref 0–5)
CK SERPL-CCNC: 78 IU/L (ref 29–168)
CO2 SERPL-SCNC: 24 MMOL/L (ref 22–29)
DEPRECATED NEUTROPHILS # BLD AUTO: 10.1 10*3/UL (ref 2.1–6.9)
EGFRCR SERPLBLD CKD-EPI 2021: 25 ML/MIN (ref 60–?)
EOSINOPHIL # BLD AUTO: 0 10*3/UL (ref 0–0.4)
EOSINOPHIL NFR BLD AUTO: 0 % (ref 0–6)
ERYTHROCYTE [DISTWIDTH] IN CORD BLOOD: 13.5 % (ref 11.7–14.4)
GLOBULIN PLAS-MCNC: 3.4 G/DL (ref 2.3–3.5)
GLUCOSE SERPLBLD-MCNC: 168 MG/DL (ref 74–118)
HCT VFR BLD AUTO: 27.4 % (ref 34.2–44.1)
HGB BLD-MCNC: 9 G/DL (ref 12–16)
LYMPHOCYTES # BLD: 0.9 10*3/UL (ref 1–3.2)
LYMPHOCYTES NFR BLD AUTO: 7.7 % (ref 18–39.1)
MCH RBC QN AUTO: 31.5 PG (ref 28–32)
MCHC RBC AUTO-ENTMCNC: 32.8 G/DL (ref 31–35)
MCV RBC AUTO: 95.8 FL (ref 81–99)
MONOCYTES # BLD AUTO: 0.7 10*3/UL (ref 0.2–0.8)
MONOCYTES NFR BLD AUTO: 6.1 % (ref 4.4–11.3)
NEUTS SEG NFR BLD AUTO: 84.6 % (ref 38.7–80)
PLATELET # BLD AUTO: 192 X10E3/UL (ref 140–360)
POTASSIUM SERPL-SCNC: 4.5 MMOL/L (ref 3.5–5.1)
RBC # BLD AUTO: 2.86 X10E6/UL (ref 3.6–5.1)
SODIUM SERPL-SCNC: 140 MMOL/L (ref 136–145)

## 2019-06-25 RX ADMIN — PROPOFOL PRN MLS/HR: 10 INJECTION, EMULSION INTRAVENOUS at 00:45

## 2019-06-25 RX ADMIN — APIXABAN SCH MG: 5 TABLET, FILM COATED ORAL at 08:55

## 2019-06-25 RX ADMIN — Medication SCH MG: at 08:55

## 2019-06-25 RX ADMIN — INSULIN HUMAN SCH UNIT: 100 INJECTION, SOLUTION PARENTERAL at 18:16

## 2019-06-25 RX ADMIN — INSULIN GLARGINE SCH UNITS: 100 INJECTION, SOLUTION SUBCUTANEOUS at 20:21

## 2019-06-25 RX ADMIN — INSULIN HUMAN SCH UNIT: 100 INJECTION, SOLUTION PARENTERAL at 23:50

## 2019-06-25 RX ADMIN — PROPOFOL PRN MLS/HR: 10 INJECTION, EMULSION INTRAVENOUS at 22:00

## 2019-06-25 RX ADMIN — AMIODARONE HYDROCHLORIDE SCH MG: 200 TABLET ORAL at 08:55

## 2019-06-25 RX ADMIN — IPRATROPIUM BROMIDE AND ALBUTEROL SULFATE SCH ML: .5; 2.5 SOLUTION RESPIRATORY (INHALATION) at 19:43

## 2019-06-25 RX ADMIN — BUMETANIDE SCH MG: 0.25 INJECTION INTRAMUSCULAR; INTRAVENOUS at 03:57

## 2019-06-25 RX ADMIN — IPRATROPIUM BROMIDE AND ALBUTEROL SULFATE SCH ML: .5; 2.5 SOLUTION RESPIRATORY (INHALATION) at 11:00

## 2019-06-25 RX ADMIN — ZINC SULFATE CAP 220 MG (50 MG ELEMENTAL ZN) SCH MG: 220 (50 ZN) CAP at 16:24

## 2019-06-25 RX ADMIN — LEVOFLOXACIN SCH MLS/HR: 5 INJECTION, SOLUTION INTRAVENOUS at 14:47

## 2019-06-25 RX ADMIN — IPRATROPIUM BROMIDE AND ALBUTEROL SULFATE SCH ML: .5; 2.5 SOLUTION RESPIRATORY (INHALATION) at 02:50

## 2019-06-25 RX ADMIN — Medication SCH MG: at 00:47

## 2019-06-25 RX ADMIN — BUMETANIDE SCH MG: 0.25 INJECTION INTRAMUSCULAR; INTRAVENOUS at 14:46

## 2019-06-25 RX ADMIN — INSULIN HUMAN SCH UNIT: 100 INJECTION, SOLUTION PARENTERAL at 16:27

## 2019-06-25 RX ADMIN — INSULIN HUMAN SCH UNIT: 100 INJECTION, SOLUTION PARENTERAL at 13:46

## 2019-06-25 RX ADMIN — Medication SCH MG: at 16:24

## 2019-06-25 RX ADMIN — IPRATROPIUM BROMIDE AND ALBUTEROL SULFATE SCH ML: .5; 2.5 SOLUTION RESPIRATORY (INHALATION) at 15:05

## 2019-06-25 RX ADMIN — APIXABAN SCH MG: 5 TABLET, FILM COATED ORAL at 16:24

## 2019-06-25 RX ADMIN — INSULIN HUMAN SCH UNIT: 100 INJECTION, SOLUTION PARENTERAL at 09:33

## 2019-06-25 RX ADMIN — ZINC SULFATE CAP 220 MG (50 MG ELEMENTAL ZN) SCH MG: 220 (50 ZN) CAP at 08:55

## 2019-06-25 RX ADMIN — TIOTROPIUM BROMIDE SCH MCG: 18 CAPSULE ORAL; RESPIRATORY (INHALATION) at 06:00

## 2019-06-25 RX ADMIN — FAMOTIDINE SCH MG: 10 INJECTION, SOLUTION INTRAVENOUS at 08:55

## 2019-06-25 RX ADMIN — OXYCODONE HYDROCHLORIDE AND ACETAMINOPHEN SCH MG: 500 TABLET ORAL at 08:55

## 2019-06-25 RX ADMIN — IPRATROPIUM BROMIDE AND ALBUTEROL SULFATE SCH ML: .5; 2.5 SOLUTION RESPIRATORY (INHALATION) at 07:10

## 2019-06-25 RX ADMIN — OXYCODONE HYDROCHLORIDE AND ACETAMINOPHEN SCH MG: 500 TABLET ORAL at 16:24

## 2019-06-25 RX ADMIN — PROPOFOL PRN MLS/HR: 10 INJECTION, EMULSION INTRAVENOUS at 19:40

## 2019-06-25 RX ADMIN — IPRATROPIUM BROMIDE AND ALBUTEROL SULFATE SCH ML: .5; 2.5 SOLUTION RESPIRATORY (INHALATION) at 23:15

## 2019-06-25 RX ADMIN — FAMOTIDINE SCH MG: 10 INJECTION, SOLUTION INTRAVENOUS at 16:24

## 2019-06-25 NOTE — CONSULTATION
DATE OF CONSULTATION:  06/25/2019

 

Critical Care Consultation 

 

REASON FOR CONSULT:  ICU management.

 

HISTORY OF PRESENT ILLNESS:  Ms. Oliveira is a 58-year-old female.  She has a history of

sleep apnea.  She uses CPAP.  She sees Dr. Lupillo Muhammad in the office.  She has a history

of coronary artery disease and chronic diastolic heart failure with chronic kidney

disease.  She came in with increasing shortness of breath to the emergency room.  She

was seen by Dr. Lucas Roy as he was consulted and the service was transferred to me as

patient is a regular patient of Dr. Lupillo Muhammad.  Currently, the patient is awake,

alert.  I tried her on CPAP and she is having tachypnea.  She is on full ventilator

support.  She has been started on Bumex 1 mg IV q.12 hourly and also on hydrocortisone.

She is on Eliquis as well.  She denies any chest pain.  She has some shortness of

breath. 

 

REVIEW OF SYSTEMS:

GENERAL:  Denies any fever or chills. 

HEAD:  Denies any head trauma. 

ENT:  Denies any earache. 

CVS:  Denies any chest pain. 

RESPIRATORY:  Shortness of breath. 

 

The rest of the review of systems are negative except as in the HPI.

 

PAST MEDICAL HISTORY:  

1. Hypertension.

2. Morbid obesity.

3. Obstructive sleep apnea.

 

PAST SURGICAL HISTORY:  Unknown.

 

FAMILY AND SOCIAL HISTORY:  She quit smoking several years ago.

 

PHYSICAL EXAMINATION:

VITAL SIGNS:  Temperature 97.6, pulse of 117, and respiratory rate is 18 to 20. 

HEENT:  Head is atraumatic and normocephalic. 

NECK:  Supple. 

CHEST:  Occasional wheezing and crackles with reduced air entry in the bases. 

HEART:  S1 and S2 audible. 

ABDOMEN:  Soft and nontender. 

EXTREMITIES:  No clubbing or cyanosis.  The patient has edema.

RADIOGRAPHIC DATA:  Chest x-ray showing bilateral alveolar infiltrate, however, her old

chest x-ray also is showing bilateral alveolar infiltrate. 

 

LABORATORY DATA:  Sodium 140, potassium 4.5, BUN 58, creatinine 2.05.  When she came in,

creatinine was 2.67.  In April 2018, her creatinine was 1.64. 

 

ASSESSMENT AND PLAN:  Ms. Oliveira is a 58-year-old female.  She came in with shortness of

breath.  Now, the patient has improved with diuresis.  More awake, more alert.  Still

tachypneic on CPAP.  We will consider doing the reading as spontaneous breathing trial

in a.m.  The patient is on Bumex 1 mg IV q.12 hourly and has significant urine output.

I will reduce the dose of hydrocortisone to 25 q.8 hourly.  The patient will be

continued on antibiotics for possibility of underlying pneumonia triggering her heart

failure.  The patient underwent a pulmonary function test in April 2018, which showed

severe restrictive pattern, likely due to obesity. 

 

The patient's chest x-ray has been abnormal with alveolar infiltrates may need a CT

chest at a later time. 

 

Critical care time spent 55 minutes.

 

 

 

 

______________________________

MD SALINAS Marshall/CESILIA

D:  06/25/2019 10:35:37

T:  06/25/2019 17:29:39

Job #:  [id number]

## 2019-06-25 NOTE — DIAGNOSTIC IMAGING REPORT
Examination: Single AP view of the chest.



COMPARISON: June 24th 2019



INDICATION: Congestive heart failure

     

DISCUSSION:



Lines/tubes:  Endotracheal tube 3 cm above the carly. Enteric tube with distal

tip not visualized.



Lungs:  Decreased, but persistent interstitial and alveolar edema.



Pleura:  Bilateral effusions with lower lung atelectasis.



Heart and mediastinum:  Cardiomegaly



Bones and soft tissues:  No acute bony abnormalities.     



IMPRESSION:

 

Cardiomegaly with mildly decreased pulmonary edema and effusions.



Signed by: Dr. Andrew Palisch, M.D. on 6/25/2019 6:04 AM

## 2019-06-25 NOTE — NUR
PT DISCUSSED IN BARRIER ROUNDS, HEART RATE 'S, GENAROL IS TO CONTINUE TO DIGANESHE, ON 
SOL MED, WILL ATTEMPT TO WEAN FROM VENT AGAIN TOMORROW.

## 2019-06-26 VITALS — SYSTOLIC BLOOD PRESSURE: 135 MMHG | DIASTOLIC BLOOD PRESSURE: 51 MMHG

## 2019-06-26 VITALS — SYSTOLIC BLOOD PRESSURE: 82 MMHG | DIASTOLIC BLOOD PRESSURE: 71 MMHG

## 2019-06-26 VITALS — SYSTOLIC BLOOD PRESSURE: 130 MMHG | DIASTOLIC BLOOD PRESSURE: 64 MMHG

## 2019-06-26 VITALS — DIASTOLIC BLOOD PRESSURE: 73 MMHG | SYSTOLIC BLOOD PRESSURE: 163 MMHG

## 2019-06-26 VITALS — SYSTOLIC BLOOD PRESSURE: 152 MMHG | DIASTOLIC BLOOD PRESSURE: 68 MMHG

## 2019-06-26 VITALS — SYSTOLIC BLOOD PRESSURE: 147 MMHG | DIASTOLIC BLOOD PRESSURE: 63 MMHG

## 2019-06-26 VITALS — DIASTOLIC BLOOD PRESSURE: 69 MMHG | SYSTOLIC BLOOD PRESSURE: 150 MMHG

## 2019-06-26 VITALS — SYSTOLIC BLOOD PRESSURE: 118 MMHG | DIASTOLIC BLOOD PRESSURE: 60 MMHG

## 2019-06-26 VITALS — SYSTOLIC BLOOD PRESSURE: 132 MMHG | DIASTOLIC BLOOD PRESSURE: 53 MMHG

## 2019-06-26 VITALS — SYSTOLIC BLOOD PRESSURE: 148 MMHG | DIASTOLIC BLOOD PRESSURE: 71 MMHG

## 2019-06-26 VITALS — DIASTOLIC BLOOD PRESSURE: 56 MMHG | SYSTOLIC BLOOD PRESSURE: 131 MMHG

## 2019-06-26 VITALS — SYSTOLIC BLOOD PRESSURE: 152 MMHG | DIASTOLIC BLOOD PRESSURE: 70 MMHG

## 2019-06-26 VITALS — DIASTOLIC BLOOD PRESSURE: 75 MMHG | SYSTOLIC BLOOD PRESSURE: 140 MMHG

## 2019-06-26 VITALS — DIASTOLIC BLOOD PRESSURE: 53 MMHG | SYSTOLIC BLOOD PRESSURE: 132 MMHG

## 2019-06-26 VITALS — DIASTOLIC BLOOD PRESSURE: 90 MMHG | SYSTOLIC BLOOD PRESSURE: 181 MMHG

## 2019-06-26 VITALS — DIASTOLIC BLOOD PRESSURE: 107 MMHG | SYSTOLIC BLOOD PRESSURE: 153 MMHG

## 2019-06-26 VITALS — SYSTOLIC BLOOD PRESSURE: 129 MMHG | DIASTOLIC BLOOD PRESSURE: 52 MMHG

## 2019-06-26 VITALS — SYSTOLIC BLOOD PRESSURE: 126 MMHG | DIASTOLIC BLOOD PRESSURE: 53 MMHG

## 2019-06-26 VITALS — SYSTOLIC BLOOD PRESSURE: 148 MMHG | DIASTOLIC BLOOD PRESSURE: 64 MMHG

## 2019-06-26 VITALS — DIASTOLIC BLOOD PRESSURE: 73 MMHG | SYSTOLIC BLOOD PRESSURE: 150 MMHG

## 2019-06-26 LAB
ANION GAP SERPL CALC-SCNC: 16.3 MMOL/L (ref 8–16)
BASE EXCESS BLDA CALC-SCNC: 6 MMOL/L (ref -2–3)
BASOPHILS # BLD AUTO: 0 10*3/UL (ref 0–0.1)
BASOPHILS NFR BLD AUTO: 0.3 % (ref 0–1)
BNP BLD-MCNC: 502.8 PG/ML (ref 0–100)
BUN SERPL-MCNC: 57 MG/DL (ref 7–26)
BUN/CREAT SERPL: 30 (ref 6–25)
CALCIUM SERPL-MCNC: 9.1 MG/DL (ref 8.4–10.2)
CHLORIDE SERPL-SCNC: 101 MMOL/L (ref 98–107)
CO2 SERPL-SCNC: 26 MMOL/L (ref 22–29)
DEPRECATED NEUTROPHILS # BLD AUTO: 8.1 10*3/UL (ref 2.1–6.9)
EGFRCR SERPLBLD CKD-EPI 2021: 27 ML/MIN (ref 60–?)
EOSINOPHIL # BLD AUTO: 0 10*3/UL (ref 0–0.4)
EOSINOPHIL NFR BLD AUTO: 0 % (ref 0–6)
ERYTHROCYTE [DISTWIDTH] IN CORD BLOOD: 14 % (ref 11.7–14.4)
GLUCOSE SERPLBLD-MCNC: 205 MG/DL (ref 74–118)
HCO3 BLDA-SCNC: 30 MMOL/L (ref 23–28)
HCT VFR BLD AUTO: 27.7 % (ref 34.2–44.1)
HGB BLD-MCNC: 9.4 G/DL (ref 12–16)
LYMPHOCYTES # BLD: 1.6 10*3/UL (ref 1–3.2)
LYMPHOCYTES NFR BLD AUTO: 14.8 % (ref 18–39.1)
MAGNESIUM SERPL-MCNC: 2.5 MG/DL (ref 1.3–2.1)
MCH RBC QN AUTO: 31.6 PG (ref 28–32)
MCHC RBC AUTO-ENTMCNC: 33.9 G/DL (ref 31–35)
MCV RBC AUTO: 93.3 FL (ref 81–99)
MONOCYTES # BLD AUTO: 0.8 10*3/UL (ref 0.2–0.8)
MONOCYTES NFR BLD AUTO: 7.8 % (ref 4.4–11.3)
NEUTS SEG NFR BLD AUTO: 76 % (ref 38.7–80)
PCO2 BLDA: 41 MMHG (ref 41–51)
PCO2 BLDA: 89 MMHG (ref 80–105)
PH BLDA: 7.47 [PH] (ref 7.31–7.41)
PLATELET # BLD AUTO: 210 X10E3/UL (ref 140–360)
POTASSIUM SERPL-SCNC: 3.3 MMOL/L (ref 3.5–5.1)
RBC # BLD AUTO: 2.97 X10E6/UL (ref 3.6–5.1)
SAO2 % BLDA: 97 % (ref 95–98)
SODIUM SERPL-SCNC: 140 MMOL/L (ref 136–145)
T4 FREE SERPL-MCNC: 1.28 NG/DL (ref 0.8–1.8)
TSH SERPL DL<=0.005 MIU/L-ACNC: 0.31 UIU/ML (ref 0.35–4.94)

## 2019-06-26 RX ADMIN — PROPOFOL PRN MLS/HR: 10 INJECTION, EMULSION INTRAVENOUS at 10:07

## 2019-06-26 RX ADMIN — LEVOFLOXACIN SCH MLS/HR: 5 INJECTION, SOLUTION INTRAVENOUS at 18:17

## 2019-06-26 RX ADMIN — AMIODARONE HYDROCHLORIDE SCH MG: 200 TABLET ORAL at 09:42

## 2019-06-26 RX ADMIN — OXYCODONE HYDROCHLORIDE AND ACETAMINOPHEN SCH MG: 500 TABLET ORAL at 18:24

## 2019-06-26 RX ADMIN — SODIUM CHLORIDE PRN MLS/HR: 9 INJECTION, SOLUTION INTRAVENOUS at 22:37

## 2019-06-26 RX ADMIN — APIXABAN SCH MG: 5 TABLET, FILM COATED ORAL at 18:26

## 2019-06-26 RX ADMIN — PROPOFOL PRN MLS/HR: 10 INJECTION, EMULSION INTRAVENOUS at 04:28

## 2019-06-26 RX ADMIN — Medication SCH MG: at 18:24

## 2019-06-26 RX ADMIN — BUMETANIDE SCH MG: 0.25 INJECTION INTRAMUSCULAR; INTRAVENOUS at 02:53

## 2019-06-26 RX ADMIN — ZINC SULFATE CAP 220 MG (50 MG ELEMENTAL ZN) SCH MG: 220 (50 ZN) CAP at 09:42

## 2019-06-26 RX ADMIN — INSULIN HUMAN SCH UNIT: 100 INJECTION, SOLUTION PARENTERAL at 23:08

## 2019-06-26 RX ADMIN — IPRATROPIUM BROMIDE AND ALBUTEROL SULFATE SCH ML: .5; 2.5 SOLUTION RESPIRATORY (INHALATION) at 23:30

## 2019-06-26 RX ADMIN — APIXABAN SCH MG: 5 TABLET, FILM COATED ORAL at 09:42

## 2019-06-26 RX ADMIN — FAMOTIDINE SCH MG: 10 INJECTION, SOLUTION INTRAVENOUS at 18:23

## 2019-06-26 RX ADMIN — SODIUM CHLORIDE PRN MLS/HR: 9 INJECTION, SOLUTION INTRAVENOUS at 14:39

## 2019-06-26 RX ADMIN — IPRATROPIUM BROMIDE AND ALBUTEROL SULFATE SCH ML: .5; 2.5 SOLUTION RESPIRATORY (INHALATION) at 19:30

## 2019-06-26 RX ADMIN — Medication SCH MG: at 09:42

## 2019-06-26 RX ADMIN — INSULIN HUMAN SCH UNIT: 100 INJECTION, SOLUTION PARENTERAL at 18:09

## 2019-06-26 RX ADMIN — INSULIN HUMAN SCH UNIT: 100 INJECTION, SOLUTION PARENTERAL at 12:00

## 2019-06-26 RX ADMIN — INSULIN HUMAN SCH UNIT: 100 INJECTION, SOLUTION PARENTERAL at 05:56

## 2019-06-26 RX ADMIN — BUMETANIDE SCH MG: 0.25 INJECTION INTRAMUSCULAR; INTRAVENOUS at 15:30

## 2019-06-26 RX ADMIN — OXYCODONE HYDROCHLORIDE AND ACETAMINOPHEN SCH MG: 500 TABLET ORAL at 09:42

## 2019-06-26 RX ADMIN — IPRATROPIUM BROMIDE AND ALBUTEROL SULFATE SCH ML: .5; 2.5 SOLUTION RESPIRATORY (INHALATION) at 10:50

## 2019-06-26 RX ADMIN — TIOTROPIUM BROMIDE SCH MCG: 18 CAPSULE ORAL; RESPIRATORY (INHALATION) at 06:00

## 2019-06-26 RX ADMIN — ZINC SULFATE CAP 220 MG (50 MG ELEMENTAL ZN) SCH MG: 220 (50 ZN) CAP at 18:24

## 2019-06-26 RX ADMIN — IPRATROPIUM BROMIDE AND ALBUTEROL SULFATE SCH ML: .5; 2.5 SOLUTION RESPIRATORY (INHALATION) at 03:05

## 2019-06-26 RX ADMIN — IPRATROPIUM BROMIDE AND ALBUTEROL SULFATE SCH ML: .5; 2.5 SOLUTION RESPIRATORY (INHALATION) at 07:35

## 2019-06-26 RX ADMIN — SODIUM CHLORIDE PRN MLS/HR: 9 INJECTION, SOLUTION INTRAVENOUS at 11:03

## 2019-06-26 RX ADMIN — Medication SCH MG: at 00:49

## 2019-06-26 RX ADMIN — PROPOFOL PRN MLS/HR: 10 INJECTION, EMULSION INTRAVENOUS at 01:18

## 2019-06-26 RX ADMIN — FAMOTIDINE SCH MG: 10 INJECTION, SOLUTION INTRAVENOUS at 09:42

## 2019-06-26 RX ADMIN — INSULIN GLARGINE SCH UNITS: 100 INJECTION, SOLUTION SUBCUTANEOUS at 20:33

## 2019-06-26 NOTE — DIAGNOSTIC IMAGING REPORT
Examination: Single AP view of the chest.



COMPARISON: 6/25/2019



INDICATION: Intubated

     

DISCUSSION:



See impression







IMPRESSION:

 

1.   Endotracheal tube and enteric tube are unchanged in position.





2. Unchanged cardiomegaly, interstitial pulmonary edema, and suspected

bilateral pleural effusions left larger than right. No new consolidation.



Signed by: Dr. Lupillo Wynne M.D. on 6/26/2019 10:07 AM

## 2019-06-26 NOTE — NUR
Nutrition Intervention Note



RD Recommendation(s) for Physician: 

-If unable to start PO in the next 24hr, rec to initiate continuous TF with Vital AF 1.2 
@10mL/hr, advance as tolerated, to goal rate of 60mL/hr. (1728kcal, 108g protein, 1168mL 
water)

-50mL water flushes q 4hr

-Check labs, gastric tolerance and weight

-If PO is feasible, rec cardiac/ ADA diet 



Plan of Care: RD following, monitoring for tolerance and adequacy, TF rec 



Nutrition reason for involvement:

Diagnosis



RD Assessment

6/26  57yo F, who was admitted for respiratory failure. Pt was discussed during AM rounds. 
Currently intubated and ventilated. Propofol was running at 21.6mL/hr (570kcal). Plan to 
wean off the vent today. No pressor meds were noted. OGT was present. IVF at 5mL/hr. Will 
continue to monitor and follow. 



Principal Problems/Diagnoses: 

1. Acute-on-chronic hypercapnic respiratory failure.

2. Acute-on-chronic diastolic heart failure.



PMH:

1. Obesity hypoventilation syndrome.

2. Chronic obstructive sleep apnea.

3. Chronic diastolic heart failure.

4. Paroxysmal atrial fibrillation.

5. Diabetes, requiring insulin at home.



GI: abdomen soft, non-tender, large 



Skin: no pressure wound noted 



Labs: (6/26) K 3.3 L, BUN 57 H, Creatinine 1.89 H, Glucose 137 H, Mg 2.5 H 



Meds: propofol (titrate), eliquis, oscal D, zinc sulfate, vitamin C, pepcid, bumetanide 



Ht: 63in

Wt: 250lb

BMI: 44.3kg/m2

IBW: 115lb 



Malnutrition Evaluation (6/26/2019)

Unable to evaluate. 



Nutrition Prescription (Diet Order): NPO 



Estimated Nutritional Needs:

Calories: 2280  2850kcal(20-25kcal/kg/d) Weight used: CBW 

Protein: 114  137g(1-1.2g/kg/d) Weight used: CBW



Diet Adequacy:

Not meeting calorie needs, Not meeting protein needs



Diet Education Needs Assessment:

Diet education indicated, but patient not appropriate for education at this time. 



Nutrition Care Level: mod 



Nutrition Diagnosis: Inadequate oral intake related to current medical status as evidenced 
by pt requiring EN as main source of nutrition. 



Goal: Patient will meet % of estimated needs by follow up 



Progress: - 



Interventions:

Composition, Rate, Route, IVF, Prescription medications



Monitoring/Evaluation:

Total energy intake, Total protein intake, Formula/Solution, IVF, Prescription medication, 
Weight change



Signed: Sridevi Dee MS, RD, LD

## 2019-06-26 NOTE — NUR
NO FAMILY AT THE BEDSIDE FOR INITIAL ASSESSMENT.



DISCUSSED IN MDR.  PT REMAINS ON VENT.  PROPOFOL, PRECEDEX, SOLUCORTEF, ABX X2 AND BUMEX.  

BEDSIDE NURSE STATES THEY WILL ATTEMPT TO WEAN TODAY.



STATES PT IS ON CPAP AT HOME.

## 2019-06-27 VITALS — SYSTOLIC BLOOD PRESSURE: 146 MMHG | DIASTOLIC BLOOD PRESSURE: 69 MMHG

## 2019-06-27 VITALS — SYSTOLIC BLOOD PRESSURE: 154 MMHG | DIASTOLIC BLOOD PRESSURE: 63 MMHG

## 2019-06-27 VITALS — DIASTOLIC BLOOD PRESSURE: 84 MMHG | SYSTOLIC BLOOD PRESSURE: 157 MMHG

## 2019-06-27 VITALS — DIASTOLIC BLOOD PRESSURE: 67 MMHG | SYSTOLIC BLOOD PRESSURE: 138 MMHG

## 2019-06-27 VITALS — DIASTOLIC BLOOD PRESSURE: 76 MMHG | SYSTOLIC BLOOD PRESSURE: 140 MMHG

## 2019-06-27 VITALS — SYSTOLIC BLOOD PRESSURE: 160 MMHG | DIASTOLIC BLOOD PRESSURE: 64 MMHG

## 2019-06-27 VITALS — SYSTOLIC BLOOD PRESSURE: 172 MMHG | DIASTOLIC BLOOD PRESSURE: 71 MMHG

## 2019-06-27 VITALS — SYSTOLIC BLOOD PRESSURE: 143 MMHG | DIASTOLIC BLOOD PRESSURE: 80 MMHG

## 2019-06-27 VITALS — DIASTOLIC BLOOD PRESSURE: 101 MMHG | SYSTOLIC BLOOD PRESSURE: 135 MMHG

## 2019-06-27 VITALS — DIASTOLIC BLOOD PRESSURE: 67 MMHG | SYSTOLIC BLOOD PRESSURE: 147 MMHG

## 2019-06-27 VITALS — SYSTOLIC BLOOD PRESSURE: 157 MMHG | DIASTOLIC BLOOD PRESSURE: 84 MMHG

## 2019-06-27 VITALS — SYSTOLIC BLOOD PRESSURE: 157 MMHG | DIASTOLIC BLOOD PRESSURE: 77 MMHG

## 2019-06-27 VITALS — DIASTOLIC BLOOD PRESSURE: 73 MMHG | SYSTOLIC BLOOD PRESSURE: 162 MMHG

## 2019-06-27 VITALS — DIASTOLIC BLOOD PRESSURE: 71 MMHG | SYSTOLIC BLOOD PRESSURE: 120 MMHG

## 2019-06-27 VITALS — SYSTOLIC BLOOD PRESSURE: 153 MMHG | DIASTOLIC BLOOD PRESSURE: 68 MMHG

## 2019-06-27 VITALS — DIASTOLIC BLOOD PRESSURE: 69 MMHG | SYSTOLIC BLOOD PRESSURE: 146 MMHG

## 2019-06-27 VITALS — DIASTOLIC BLOOD PRESSURE: 80 MMHG | SYSTOLIC BLOOD PRESSURE: 143 MMHG

## 2019-06-27 LAB
ANION GAP SERPL CALC-SCNC: 14.9 MMOL/L (ref 8–16)
BASOPHILS # BLD AUTO: 0.1 10*3/UL (ref 0–0.1)
BASOPHILS NFR BLD AUTO: 0.5 % (ref 0–1)
BUN SERPL-MCNC: 43 MG/DL (ref 7–26)
BUN/CREAT SERPL: 25 (ref 6–25)
CALCIUM SERPL-MCNC: 9.4 MG/DL (ref 8.4–10.2)
CHLORIDE SERPL-SCNC: 101 MMOL/L (ref 98–107)
CO2 SERPL-SCNC: 30 MMOL/L (ref 22–29)
DEPRECATED NEUTROPHILS # BLD AUTO: 8.5 10*3/UL (ref 2.1–6.9)
EGFRCR SERPLBLD CKD-EPI 2021: 31 ML/MIN (ref 60–?)
EOSINOPHIL # BLD AUTO: 0.2 10*3/UL (ref 0–0.4)
EOSINOPHIL NFR BLD AUTO: 1.8 % (ref 0–6)
ERYTHROCYTE [DISTWIDTH] IN CORD BLOOD: 14 % (ref 11.7–14.4)
GLUCOSE SERPLBLD-MCNC: 129 MG/DL (ref 74–118)
HCT VFR BLD AUTO: 30.6 % (ref 34.2–44.1)
HGB BLD-MCNC: 9.7 G/DL (ref 12–16)
LYMPHOCYTES # BLD: 2.8 10*3/UL (ref 1–3.2)
LYMPHOCYTES NFR BLD AUTO: 22 % (ref 18–39.1)
MAGNESIUM SERPL-MCNC: 2.1 MG/DL (ref 1.3–2.1)
MCH RBC QN AUTO: 30.4 PG (ref 28–32)
MCHC RBC AUTO-ENTMCNC: 31.7 G/DL (ref 31–35)
MCV RBC AUTO: 95.9 FL (ref 81–99)
MONOCYTES # BLD AUTO: 1 10*3/UL (ref 0.2–0.8)
MONOCYTES NFR BLD AUTO: 8 % (ref 4.4–11.3)
NEUTS SEG NFR BLD AUTO: 67.1 % (ref 38.7–80)
PLATELET # BLD AUTO: 206 X10E3/UL (ref 140–360)
POTASSIUM SERPL-SCNC: 2.9 MMOL/L (ref 3.5–5.1)
RBC # BLD AUTO: 3.19 X10E6/UL (ref 3.6–5.1)
SODIUM SERPL-SCNC: 143 MMOL/L (ref 136–145)

## 2019-06-27 RX ADMIN — LEVOFLOXACIN SCH MLS/HR: 5 INJECTION, SOLUTION INTRAVENOUS at 15:38

## 2019-06-27 RX ADMIN — BUMETANIDE SCH MG: 0.25 INJECTION INTRAMUSCULAR; INTRAVENOUS at 03:04

## 2019-06-27 RX ADMIN — IPRATROPIUM BROMIDE AND ALBUTEROL SULFATE SCH ML: .5; 2.5 SOLUTION RESPIRATORY (INHALATION) at 11:09

## 2019-06-27 RX ADMIN — EZETIMIBE SCH MG: 10 TABLET ORAL at 12:03

## 2019-06-27 RX ADMIN — Medication SCH MG: at 17:01

## 2019-06-27 RX ADMIN — BUMETANIDE SCH MG: 0.25 INJECTION INTRAMUSCULAR; INTRAVENOUS at 15:38

## 2019-06-27 RX ADMIN — METHYLPREDNISOLONE SODIUM SUCCINATE SCH MG: 40 INJECTION, POWDER, LYOPHILIZED, FOR SOLUTION INTRAMUSCULAR; INTRAVENOUS at 20:30

## 2019-06-27 RX ADMIN — HYDROCODONE BITARTRATE AND ACETAMINOPHEN PRN EA: 10; 325 TABLET ORAL at 20:32

## 2019-06-27 RX ADMIN — INSULIN HUMAN SCH UNIT: 100 INJECTION, SOLUTION PARENTERAL at 17:06

## 2019-06-27 RX ADMIN — INSULIN HUMAN SCH UNIT: 100 INJECTION, SOLUTION PARENTERAL at 12:30

## 2019-06-27 RX ADMIN — APIXABAN SCH MG: 5 TABLET, FILM COATED ORAL at 08:25

## 2019-06-27 RX ADMIN — IPRATROPIUM BROMIDE AND ALBUTEROL SULFATE SCH ML: .5; 2.5 SOLUTION RESPIRATORY (INHALATION) at 03:30

## 2019-06-27 RX ADMIN — AMIODARONE HYDROCHLORIDE SCH MG: 200 TABLET ORAL at 08:25

## 2019-06-27 RX ADMIN — FAMOTIDINE SCH MG: 10 INJECTION, SOLUTION INTRAVENOUS at 08:24

## 2019-06-27 RX ADMIN — IPRATROPIUM BROMIDE AND ALBUTEROL SULFATE SCH ML: .5; 2.5 SOLUTION RESPIRATORY (INHALATION) at 07:15

## 2019-06-27 RX ADMIN — APIXABAN SCH MG: 5 TABLET, FILM COATED ORAL at 17:01

## 2019-06-27 RX ADMIN — SODIUM CHLORIDE PRN MG: 900 INJECTION INTRAVENOUS at 22:53

## 2019-06-27 RX ADMIN — IPRATROPIUM BROMIDE AND ALBUTEROL SULFATE SCH ML: .5; 2.5 SOLUTION RESPIRATORY (INHALATION) at 15:00

## 2019-06-27 RX ADMIN — ZINC SULFATE CAP 220 MG (50 MG ELEMENTAL ZN) SCH MG: 220 (50 ZN) CAP at 08:25

## 2019-06-27 RX ADMIN — SODIUM CHLORIDE PRN MG: 900 INJECTION INTRAVENOUS at 15:40

## 2019-06-27 RX ADMIN — Medication SCH MG: at 08:25

## 2019-06-27 RX ADMIN — Medication PRN MG: at 12:04

## 2019-06-27 RX ADMIN — HYDROCODONE BITARTRATE AND ACETAMINOPHEN PRN EA: 10; 325 TABLET ORAL at 12:04

## 2019-06-27 RX ADMIN — INSULIN HUMAN SCH UNIT: 100 INJECTION, SOLUTION PARENTERAL at 23:58

## 2019-06-27 RX ADMIN — TIOTROPIUM BROMIDE SCH MCG: 18 CAPSULE ORAL; RESPIRATORY (INHALATION) at 06:05

## 2019-06-27 RX ADMIN — IPRATROPIUM BROMIDE AND ALBUTEROL SULFATE SCH ML: .5; 2.5 SOLUTION RESPIRATORY (INHALATION) at 19:30

## 2019-06-27 RX ADMIN — INSULIN HUMAN SCH UNIT: 100 INJECTION, SOLUTION PARENTERAL at 06:00

## 2019-06-27 RX ADMIN — ZINC SULFATE CAP 220 MG (50 MG ELEMENTAL ZN) SCH MG: 220 (50 ZN) CAP at 17:01

## 2019-06-27 RX ADMIN — OXYCODONE HYDROCHLORIDE AND ACETAMINOPHEN SCH MG: 500 TABLET ORAL at 17:01

## 2019-06-27 RX ADMIN — SODIUM CHLORIDE PRN MLS/HR: 9 INJECTION, SOLUTION INTRAVENOUS at 04:22

## 2019-06-27 RX ADMIN — IPRATROPIUM BROMIDE AND ALBUTEROL SULFATE SCH ML: .5; 2.5 SOLUTION RESPIRATORY (INHALATION) at 23:30

## 2019-06-27 RX ADMIN — OXYCODONE HYDROCHLORIDE AND ACETAMINOPHEN SCH MG: 500 TABLET ORAL at 08:25

## 2019-06-27 RX ADMIN — INSULIN GLARGINE SCH UNITS: 100 INJECTION, SOLUTION SUBCUTANEOUS at 20:52

## 2019-06-27 NOTE — NUR
Critical Potassium level called to Dr. Valladares's NP, Catrina, on the way to the unit. No new 
orders at this time

## 2019-06-27 NOTE — NUR
Per XENIA Martinez if ok w/Dr. Meraz pt may transfer out. Pt ok to transfer to [facility] by Dr. Meraz

## 2019-06-28 VITALS — DIASTOLIC BLOOD PRESSURE: 104 MMHG | SYSTOLIC BLOOD PRESSURE: 129 MMHG

## 2019-06-28 VITALS — DIASTOLIC BLOOD PRESSURE: 86 MMHG | SYSTOLIC BLOOD PRESSURE: 156 MMHG

## 2019-06-28 VITALS — SYSTOLIC BLOOD PRESSURE: 153 MMHG | DIASTOLIC BLOOD PRESSURE: 90 MMHG

## 2019-06-28 VITALS — DIASTOLIC BLOOD PRESSURE: 82 MMHG | SYSTOLIC BLOOD PRESSURE: 155 MMHG

## 2019-06-28 VITALS — SYSTOLIC BLOOD PRESSURE: 144 MMHG | DIASTOLIC BLOOD PRESSURE: 76 MMHG

## 2019-06-28 VITALS — SYSTOLIC BLOOD PRESSURE: 160 MMHG | DIASTOLIC BLOOD PRESSURE: 86 MMHG

## 2019-06-28 VITALS — SYSTOLIC BLOOD PRESSURE: 157 MMHG | DIASTOLIC BLOOD PRESSURE: 81 MMHG

## 2019-06-28 VITALS — SYSTOLIC BLOOD PRESSURE: 161 MMHG | DIASTOLIC BLOOD PRESSURE: 93 MMHG

## 2019-06-28 VITALS — DIASTOLIC BLOOD PRESSURE: 72 MMHG | SYSTOLIC BLOOD PRESSURE: 132 MMHG

## 2019-06-28 VITALS — SYSTOLIC BLOOD PRESSURE: 126 MMHG | DIASTOLIC BLOOD PRESSURE: 61 MMHG

## 2019-06-28 VITALS — DIASTOLIC BLOOD PRESSURE: 93 MMHG | SYSTOLIC BLOOD PRESSURE: 158 MMHG

## 2019-06-28 VITALS — SYSTOLIC BLOOD PRESSURE: 159 MMHG | DIASTOLIC BLOOD PRESSURE: 85 MMHG

## 2019-06-28 VITALS — DIASTOLIC BLOOD PRESSURE: 68 MMHG | SYSTOLIC BLOOD PRESSURE: 133 MMHG

## 2019-06-28 VITALS — DIASTOLIC BLOOD PRESSURE: 61 MMHG | SYSTOLIC BLOOD PRESSURE: 105 MMHG

## 2019-06-28 VITALS — SYSTOLIC BLOOD PRESSURE: 123 MMHG | DIASTOLIC BLOOD PRESSURE: 60 MMHG

## 2019-06-28 VITALS — DIASTOLIC BLOOD PRESSURE: 60 MMHG | SYSTOLIC BLOOD PRESSURE: 123 MMHG

## 2019-06-28 VITALS — SYSTOLIC BLOOD PRESSURE: 154 MMHG | DIASTOLIC BLOOD PRESSURE: 141 MMHG

## 2019-06-28 VITALS — SYSTOLIC BLOOD PRESSURE: 108 MMHG | DIASTOLIC BLOOD PRESSURE: 52 MMHG

## 2019-06-28 VITALS — SYSTOLIC BLOOD PRESSURE: 156 MMHG | DIASTOLIC BLOOD PRESSURE: 81 MMHG

## 2019-06-28 VITALS — SYSTOLIC BLOOD PRESSURE: 149 MMHG | DIASTOLIC BLOOD PRESSURE: 89 MMHG

## 2019-06-28 VITALS — SYSTOLIC BLOOD PRESSURE: 136 MMHG | DIASTOLIC BLOOD PRESSURE: 89 MMHG

## 2019-06-28 VITALS — SYSTOLIC BLOOD PRESSURE: 116 MMHG | DIASTOLIC BLOOD PRESSURE: 70 MMHG

## 2019-06-28 VITALS — SYSTOLIC BLOOD PRESSURE: 158 MMHG | DIASTOLIC BLOOD PRESSURE: 85 MMHG

## 2019-06-28 VITALS — DIASTOLIC BLOOD PRESSURE: 83 MMHG | SYSTOLIC BLOOD PRESSURE: 160 MMHG

## 2019-06-28 VITALS — DIASTOLIC BLOOD PRESSURE: 96 MMHG | SYSTOLIC BLOOD PRESSURE: 122 MMHG

## 2019-06-28 LAB
AMYLASE SERPL-CCNC: 50 U/L (ref 25–125)
ANION GAP SERPL CALC-SCNC: 18.4 MMOL/L (ref 8–16)
BASOPHILS # BLD AUTO: 0 10*3/UL (ref 0–0.1)
BASOPHILS NFR BLD AUTO: 0.3 % (ref 0–1)
BUN SERPL-MCNC: 46 MG/DL (ref 7–26)
BUN/CREAT SERPL: 22 (ref 6–25)
CALCIUM SERPL-MCNC: 9.5 MG/DL (ref 8.4–10.2)
CHLORIDE SERPL-SCNC: 101 MMOL/L (ref 98–107)
CO2 SERPL-SCNC: 30 MMOL/L (ref 22–29)
DEPRECATED NEUTROPHILS # BLD AUTO: 9.6 10*3/UL (ref 2.1–6.9)
EGFRCR SERPLBLD CKD-EPI 2021: 25 ML/MIN (ref 60–?)
EOSINOPHIL # BLD AUTO: 0 10*3/UL (ref 0–0.4)
EOSINOPHIL NFR BLD AUTO: 0.2 % (ref 0–6)
ERYTHROCYTE [DISTWIDTH] IN CORD BLOOD: 14.4 % (ref 11.7–14.4)
GLUCOSE SERPLBLD-MCNC: 207 MG/DL (ref 74–118)
HCT VFR BLD AUTO: 36.6 % (ref 34.2–44.1)
HGB BLD-MCNC: 11.4 G/DL (ref 12–16)
LIPASE SERPL-CCNC: 27 U/L (ref 8–78)
LYMPHOCYTES # BLD: 0.7 10*3/UL (ref 1–3.2)
LYMPHOCYTES NFR BLD AUTO: 6.7 % (ref 18–39.1)
MAGNESIUM SERPL-MCNC: 2.4 MG/DL (ref 1.3–2.1)
MCH RBC QN AUTO: 31.1 PG (ref 28–32)
MCHC RBC AUTO-ENTMCNC: 31.1 G/DL (ref 31–35)
MCV RBC AUTO: 100 FL (ref 81–99)
MONOCYTES # BLD AUTO: 0.3 10*3/UL (ref 0.2–0.8)
MONOCYTES NFR BLD AUTO: 2.5 % (ref 4.4–11.3)
NEUTS SEG NFR BLD AUTO: 89.8 % (ref 38.7–80)
PLATELET # BLD AUTO: 210 X10E3/UL (ref 140–360)
POTASSIUM SERPL-SCNC: 4.4 MMOL/L (ref 3.5–5.1)
RBC # BLD AUTO: 3.66 X10E6/UL (ref 3.6–5.1)
SODIUM SERPL-SCNC: 145 MMOL/L (ref 136–145)

## 2019-06-28 RX ADMIN — PANTOPRAZOLE SODIUM SCH MLS/HR: 40 INJECTION, POWDER, FOR SOLUTION INTRAVENOUS at 12:08

## 2019-06-28 RX ADMIN — INSULIN HUMAN SCH UNIT: 100 INJECTION, SOLUTION PARENTERAL at 16:26

## 2019-06-28 RX ADMIN — APIXABAN SCH MG: 5 TABLET, FILM COATED ORAL at 16:24

## 2019-06-28 RX ADMIN — INSULIN HUMAN SCH UNIT: 100 INJECTION, SOLUTION PARENTERAL at 11:53

## 2019-06-28 RX ADMIN — BUMETANIDE SCH MG: 0.25 INJECTION INTRAMUSCULAR; INTRAVENOUS at 03:56

## 2019-06-28 RX ADMIN — TIOTROPIUM BROMIDE SCH MCG: 18 CAPSULE ORAL; RESPIRATORY (INHALATION) at 07:08

## 2019-06-28 RX ADMIN — INSULIN HUMAN SCH UNIT: 100 INJECTION, SOLUTION PARENTERAL at 20:46

## 2019-06-28 RX ADMIN — SODIUM CHLORIDE SCH MLS/HR: 900 INJECTION INTRAVENOUS at 04:18

## 2019-06-28 RX ADMIN — AMIODARONE HYDROCHLORIDE SCH MG: 200 TABLET ORAL at 08:53

## 2019-06-28 RX ADMIN — Medication SCH MG: at 16:24

## 2019-06-28 RX ADMIN — Medication SCH MG: at 09:00

## 2019-06-28 RX ADMIN — IPRATROPIUM BROMIDE AND ALBUTEROL SULFATE SCH ML: .5; 2.5 SOLUTION RESPIRATORY (INHALATION) at 23:00

## 2019-06-28 RX ADMIN — ZINC SULFATE CAP 220 MG (50 MG ELEMENTAL ZN) SCH MG: 220 (50 ZN) CAP at 09:00

## 2019-06-28 RX ADMIN — INSULIN HUMAN SCH UNIT: 100 INJECTION, SOLUTION PARENTERAL at 06:00

## 2019-06-28 RX ADMIN — IPRATROPIUM BROMIDE AND ALBUTEROL SULFATE SCH ML: .5; 2.5 SOLUTION RESPIRATORY (INHALATION) at 11:12

## 2019-06-28 RX ADMIN — PANTOPRAZOLE SODIUM SCH MLS/HR: 40 INJECTION, POWDER, FOR SOLUTION INTRAVENOUS at 08:37

## 2019-06-28 RX ADMIN — ZINC SULFATE CAP 220 MG (50 MG ELEMENTAL ZN) SCH MG: 220 (50 ZN) CAP at 16:24

## 2019-06-28 RX ADMIN — METHYLPREDNISOLONE SODIUM SUCCINATE SCH MG: 40 INJECTION, POWDER, LYOPHILIZED, FOR SOLUTION INTRAMUSCULAR; INTRAVENOUS at 08:53

## 2019-06-28 RX ADMIN — IPRATROPIUM BROMIDE AND ALBUTEROL SULFATE SCH ML: .5; 2.5 SOLUTION RESPIRATORY (INHALATION) at 03:00

## 2019-06-28 RX ADMIN — PANTOPRAZOLE SODIUM SCH MLS/HR: 40 INJECTION, POWDER, FOR SOLUTION INTRAVENOUS at 17:54

## 2019-06-28 RX ADMIN — OXYCODONE HYDROCHLORIDE AND ACETAMINOPHEN SCH MG: 500 TABLET ORAL at 09:08

## 2019-06-28 RX ADMIN — OXYCODONE HYDROCHLORIDE AND ACETAMINOPHEN SCH MG: 500 TABLET ORAL at 09:00

## 2019-06-28 RX ADMIN — IPRATROPIUM BROMIDE AND ALBUTEROL SULFATE SCH ML: .5; 2.5 SOLUTION RESPIRATORY (INHALATION) at 15:00

## 2019-06-28 RX ADMIN — IPRATROPIUM BROMIDE AND ALBUTEROL SULFATE SCH ML: .5; 2.5 SOLUTION RESPIRATORY (INHALATION) at 20:30

## 2019-06-28 RX ADMIN — EZETIMIBE SCH MG: 10 TABLET ORAL at 09:08

## 2019-06-28 RX ADMIN — Medication SCH MG: at 09:08

## 2019-06-28 RX ADMIN — AMLODIPINE BESYLATE SCH MG: 10 TABLET ORAL at 08:53

## 2019-06-28 RX ADMIN — METHYLPREDNISOLONE SODIUM SUCCINATE SCH MG: 40 INJECTION, POWDER, LYOPHILIZED, FOR SOLUTION INTRAMUSCULAR; INTRAVENOUS at 20:45

## 2019-06-28 RX ADMIN — METOPROLOL TARTRATE PRN MG: 1 INJECTION, SOLUTION INTRAVENOUS at 12:30

## 2019-06-28 RX ADMIN — IPRATROPIUM BROMIDE AND ALBUTEROL SULFATE SCH ML: .5; 2.5 SOLUTION RESPIRATORY (INHALATION) at 07:08

## 2019-06-28 RX ADMIN — ZINC SULFATE CAP 220 MG (50 MG ELEMENTAL ZN) SCH MG: 220 (50 ZN) CAP at 09:08

## 2019-06-28 RX ADMIN — PROMETHAZINE HYDROCHLORIDE PRN MG: 25 INJECTION, SOLUTION INTRAMUSCULAR; INTRAVENOUS at 11:35

## 2019-06-28 RX ADMIN — OXYCODONE HYDROCHLORIDE AND ACETAMINOPHEN SCH MG: 500 TABLET ORAL at 16:24

## 2019-06-28 RX ADMIN — INSULIN GLARGINE SCH UNITS: 100 INJECTION, SOLUTION SUBCUTANEOUS at 20:45

## 2019-06-28 RX ADMIN — METOPROLOL TARTRATE PRN MG: 1 INJECTION, SOLUTION INTRAVENOUS at 02:00

## 2019-06-28 RX ADMIN — HYDROCODONE BITARTRATE AND ACETAMINOPHEN PRN EA: 10; 325 TABLET ORAL at 20:44

## 2019-06-28 RX ADMIN — Medication PRN MG: at 20:44

## 2019-06-28 RX ADMIN — PROMETHAZINE HYDROCHLORIDE PRN MG: 25 INJECTION, SOLUTION INTRAMUSCULAR; INTRAVENOUS at 01:36

## 2019-06-28 RX ADMIN — SODIUM CHLORIDE SCH MLS/HR: 900 INJECTION INTRAVENOUS at 08:21

## 2019-06-28 RX ADMIN — APIXABAN SCH MG: 5 TABLET, FILM COATED ORAL at 08:53

## 2019-06-28 RX ADMIN — SODIUM CHLORIDE PRN MG: 900 INJECTION INTRAVENOUS at 05:01

## 2019-06-28 NOTE — CONSULTATION
DATE OF CONSULTATION:  06/28/2019  

 

REASON FOR CONSULTATION:  Recommendation for antibiotic for pneumonia in this patient

who is currently in the intensive care unit at [facility]. 

 

HISTORY OF PRESENT ILLNESS:  She is a 58-year-old white female, history of obesity,

history of hypoventilation syndrome, sleep apnea, obstructive sleep apnea, chronic

diastolic congestive heart failure, atrial fibrillation, diabetes mellitus, on insulin.

The patient presented with shortness of breath to the emergency room here on 06/24/2019.

 The patient who apparently has history of hypoventilation syndrome as well as sleep

apnea, she uses CPAP at house.  She was in the hospital in the fall of 2017, had to be

intubated and be on a ventilator and then she was again here in April 2018.  She is

coming now with shortness of breath and cough.  The patient was admitted.  The patient

who had to be intubated, she is currently on BiPAP.  Her sputum showing Staphylococcus

aureus, but the patient is currently alert, oriented, on BiPAP, comfortable lying in bed

in ICU. 

 

The patient also has history of cardiac stent as well as chronic diastolic congestive

heart failure with chronic kidney disease stage 3.  She has been on amiodarone and

Eliquis. 

 

The patient comes in with shortness of breath for 3 to 4 days, some cough which was

nonproductive, came to emergency room.  BiPAP was attempted without improvement, had to

be intubated, started on antibiotic.  She is currently extubated, lying in bed, but I am

asked to see her to address the issue of antibiotic.  She states she is feeling better. 

 

PAST MEDICAL HISTORY:  As above, obesity, hypoventilation syndrome, chronic obstructive

sleep apnea, chronic diastolic heart failure, diabetes mellitus, on insulin. 

 

ALLERGIES:  NKA.

 

SOCIAL HISTORY:  There is no smoking, drug abuse, or alcohol abuse.

 

FAMILY HISTORY:  Noncontributory.

 

REVIEW OF SYSTEMS:

GENERAL:  At the present time, she states she is feeling better. 

HEENT:  There is no headache, visual changes, hearing changes. 

GI:  There is no nausea, vomiting, or diarrhea. 

CARDIAC:  There is no arrhythmia. 

NEURO:  No seizure activity. 

SKIN:  There are no other rashes. 

JOINT:  There is no erythema or edema.

 

LABORATORY DATA:  Her sputum is showing MRSA.  She has less than 25 squamous epithelial

cells with good quality, few WBCs and few gram-positive cocci.  The MRSA was showing ELANA

of 2 to the vancomycin and sensitive only to gentamicin and Bactrim.  Her white count on

admission was 10.99, today, 10.65.  Her hemoglobin 11.  Her sodium was 145, potassium

__________, creatinine 2.07, calcium 9.5. 

 

MEDICATION LIST:  She is on Lopressor 2.5 mg q.6 hours p.r.n., insulin, Zetia, Norvasc,

Solu-Medrol, Eliquis.  She is on vancomycin, Zofran, insulin. 

 

PHYSICAL EXAMINATION:

GENERAL:  She is currently alert, comfortable. 

VITAL SIGNS:  Stable.  When she first came, she had temperature 101.1, but none since

then. 

HEENT:  Normocephalic. 

CHEST:  Few crackles at the bases. 

COR:  S1, S2.  No S3, S4, or murmur. 

ABDOMEN:  Soft.  Bowel sounds present.  No tenderness. 

EXTREMITIES:  No edema. 

SKIN:  There is no rash.  The patient is obese.

IMPRESSION:  

1. Respiratory failure, present on admission.  Shortness of breath, present on

admission, resolved, probably fluid overload.  The patient was given Bumex. 

2. Colonization with methicillin-resistant Staphylococcus aureus with vancomycin ELANA of

2.  I think the patient is colonized with this bacteria.  We can discontinue antibiotic.

 The patient was started on vancomycin today, but clinically she gets better before we

started on vancomycin.  She was on Levaquin before that.  She has been on antibiotic for

5 days, so even she had bronchitis.  I think that will be adequate. 

3. Obesity.

4. Diabetes mellitus.

5. We will observe the patient clinically for the time being.  Continue with diuresis.

We will discuss with Pulmonary.  Further recommendations to follow. 

 

 

 

 

______________________________

MD GUDELIA Lopez/MODL

D:  06/28/2019 15:16:40

T:  06/28/2019 20:55:21

Job #:  [id number]

## 2019-06-28 NOTE — DIAGNOSTIC IMAGING REPORT
Exam: Abdominal film 



Clinical History: Evaluate for obstruction



Comparison: None.



DISCUSSION: Nonobstructive bowel gas pattern. Air filled stomach. Soft tissue

attenuation limits evaluation.



IMPRESSION:



Air-filled stomach. No obstructive pattern.















Signed by: Dr. Andrew Palisch, M.D. on 6/28/2019 6:50 AM

## 2019-06-28 NOTE — NUR
patient falling asleep with severe obstructive apnea and O2 sats drop down to 82%. applied 
home bi-pap. o2 sats now 96%

## 2019-06-28 NOTE — NUR
Pt with vomiting of 250ml grainy clear tea colored emesis with small stringy blood clots.  
Called to Dr Valladares's team, Catrina Martinez NP.  New orders received.  Will monitor.

## 2019-06-29 VITALS — DIASTOLIC BLOOD PRESSURE: 77 MMHG | SYSTOLIC BLOOD PRESSURE: 109 MMHG

## 2019-06-29 VITALS — DIASTOLIC BLOOD PRESSURE: 69 MMHG | SYSTOLIC BLOOD PRESSURE: 151 MMHG

## 2019-06-29 VITALS — DIASTOLIC BLOOD PRESSURE: 70 MMHG | SYSTOLIC BLOOD PRESSURE: 140 MMHG

## 2019-06-29 VITALS — DIASTOLIC BLOOD PRESSURE: 70 MMHG | SYSTOLIC BLOOD PRESSURE: 123 MMHG

## 2019-06-29 VITALS — SYSTOLIC BLOOD PRESSURE: 120 MMHG | DIASTOLIC BLOOD PRESSURE: 66 MMHG

## 2019-06-29 VITALS — SYSTOLIC BLOOD PRESSURE: 118 MMHG | DIASTOLIC BLOOD PRESSURE: 64 MMHG

## 2019-06-29 VITALS — SYSTOLIC BLOOD PRESSURE: 140 MMHG | DIASTOLIC BLOOD PRESSURE: 70 MMHG

## 2019-06-29 VITALS — SYSTOLIC BLOOD PRESSURE: 138 MMHG | DIASTOLIC BLOOD PRESSURE: 66 MMHG

## 2019-06-29 VITALS — SYSTOLIC BLOOD PRESSURE: 105 MMHG | DIASTOLIC BLOOD PRESSURE: 83 MMHG

## 2019-06-29 VITALS — SYSTOLIC BLOOD PRESSURE: 136 MMHG | DIASTOLIC BLOOD PRESSURE: 64 MMHG

## 2019-06-29 VITALS — DIASTOLIC BLOOD PRESSURE: 75 MMHG | SYSTOLIC BLOOD PRESSURE: 133 MMHG

## 2019-06-29 VITALS — DIASTOLIC BLOOD PRESSURE: 66 MMHG | SYSTOLIC BLOOD PRESSURE: 120 MMHG

## 2019-06-29 VITALS — DIASTOLIC BLOOD PRESSURE: 62 MMHG | SYSTOLIC BLOOD PRESSURE: 148 MMHG

## 2019-06-29 VITALS — DIASTOLIC BLOOD PRESSURE: 72 MMHG | SYSTOLIC BLOOD PRESSURE: 135 MMHG

## 2019-06-29 LAB
ANION GAP SERPL CALC-SCNC: 16.1 MMOL/L (ref 8–16)
BASOPHILS # BLD AUTO: 0 10*3/UL (ref 0–0.1)
BASOPHILS NFR BLD AUTO: 0.2 % (ref 0–1)
BUN SERPL-MCNC: 52 MG/DL (ref 7–26)
BUN/CREAT SERPL: 26 (ref 6–25)
CALCIUM SERPL-MCNC: 9.3 MG/DL (ref 8.4–10.2)
CHLORIDE SERPL-SCNC: 101 MMOL/L (ref 98–107)
CO2 SERPL-SCNC: 30 MMOL/L (ref 22–29)
DEPRECATED NEUTROPHILS # BLD AUTO: 9.7 10*3/UL (ref 2.1–6.9)
EGFRCR SERPLBLD CKD-EPI 2021: 25 ML/MIN (ref 60–?)
EOSINOPHIL # BLD AUTO: 0 10*3/UL (ref 0–0.4)
EOSINOPHIL NFR BLD AUTO: 0.1 % (ref 0–6)
ERYTHROCYTE [DISTWIDTH] IN CORD BLOOD: 14.1 % (ref 11.7–14.4)
FERRITIN SERPL-MCNC: 187.52 NG/ML (ref 4.63–204)
GLUCOSE SERPLBLD-MCNC: 180 MG/DL (ref 74–118)
HCT VFR BLD AUTO: 32.6 % (ref 34.2–44.1)
HGB BLD-MCNC: 10.4 G/DL (ref 12–16)
IRON SATN MFR SERPL: 17 % (ref 15–50)
IRON SERPL-MCNC: 50 UG/DL (ref 50–170)
LYMPHOCYTES # BLD: 1.5 10*3/UL (ref 1–3.2)
LYMPHOCYTES NFR BLD AUTO: 12.4 % (ref 18–39.1)
MCH RBC QN AUTO: 31.1 PG (ref 28–32)
MCHC RBC AUTO-ENTMCNC: 31.9 G/DL (ref 31–35)
MCV RBC AUTO: 97.6 FL (ref 81–99)
MONOCYTES # BLD AUTO: 0.8 10*3/UL (ref 0.2–0.8)
MONOCYTES NFR BLD AUTO: 6.4 % (ref 4.4–11.3)
NEUTS SEG NFR BLD AUTO: 80.2 % (ref 38.7–80)
PLATELET # BLD AUTO: 214 X10E3/UL (ref 140–360)
POTASSIUM SERPL-SCNC: 4.1 MMOL/L (ref 3.5–5.1)
RBC # BLD AUTO: 3.34 X10E6/UL (ref 3.6–5.1)
SODIUM SERPL-SCNC: 143 MMOL/L (ref 136–145)
TIBC SERPL-MCNC: 302 UG/DL (ref 261–478)
TRANSFERRIN SERPL-MCNC: 216 MG/DL (ref 180–382)

## 2019-06-29 RX ADMIN — IPRATROPIUM BROMIDE AND ALBUTEROL SULFATE SCH ML: .5; 2.5 SOLUTION RESPIRATORY (INHALATION) at 07:50

## 2019-06-29 RX ADMIN — INSULIN HUMAN SCH UNIT: 100 INJECTION, SOLUTION PARENTERAL at 16:37

## 2019-06-29 RX ADMIN — NYSTATIN SCH GM: 100000 POWDER TOPICAL at 17:02

## 2019-06-29 RX ADMIN — OXYCODONE HYDROCHLORIDE AND ACETAMINOPHEN SCH MG: 500 TABLET ORAL at 17:02

## 2019-06-29 RX ADMIN — IPRATROPIUM BROMIDE AND ALBUTEROL SULFATE SCH ML: .5; 2.5 SOLUTION RESPIRATORY (INHALATION) at 15:20

## 2019-06-29 RX ADMIN — IPRATROPIUM BROMIDE AND ALBUTEROL SULFATE SCH ML: .5; 2.5 SOLUTION RESPIRATORY (INHALATION) at 03:30

## 2019-06-29 RX ADMIN — IPRATROPIUM BROMIDE AND ALBUTEROL SULFATE SCH ML: .5; 2.5 SOLUTION RESPIRATORY (INHALATION) at 11:00

## 2019-06-29 RX ADMIN — Medication SCH MG: at 17:02

## 2019-06-29 RX ADMIN — OXYCODONE HYDROCHLORIDE AND ACETAMINOPHEN SCH MG: 500 TABLET ORAL at 08:31

## 2019-06-29 RX ADMIN — IPRATROPIUM BROMIDE AND ALBUTEROL SULFATE SCH ML: .5; 2.5 SOLUTION RESPIRATORY (INHALATION) at 18:55

## 2019-06-29 RX ADMIN — INSULIN HUMAN SCH UNIT: 100 INJECTION, SOLUTION PARENTERAL at 11:59

## 2019-06-29 RX ADMIN — ZINC SULFATE CAP 220 MG (50 MG ELEMENTAL ZN) SCH MG: 220 (50 ZN) CAP at 17:02

## 2019-06-29 RX ADMIN — PANTOPRAZOLE SODIUM SCH MLS/HR: 40 INJECTION, POWDER, FOR SOLUTION INTRAVENOUS at 04:49

## 2019-06-29 RX ADMIN — AMLODIPINE BESYLATE SCH MG: 10 TABLET ORAL at 08:31

## 2019-06-29 RX ADMIN — IPRATROPIUM BROMIDE AND ALBUTEROL SULFATE SCH ML: .5; 2.5 SOLUTION RESPIRATORY (INHALATION) at 23:40

## 2019-06-29 RX ADMIN — METHYLPREDNISOLONE SODIUM SUCCINATE SCH MG: 40 INJECTION, POWDER, LYOPHILIZED, FOR SOLUTION INTRAMUSCULAR; INTRAVENOUS at 20:55

## 2019-06-29 RX ADMIN — PANTOPRAZOLE SODIUM SCH MLS/HR: 40 INJECTION, POWDER, FOR SOLUTION INTRAVENOUS at 09:42

## 2019-06-29 RX ADMIN — AMIODARONE HYDROCHLORIDE SCH MG: 200 TABLET ORAL at 08:31

## 2019-06-29 RX ADMIN — PANTOPRAZOLE SODIUM SCH MLS/HR: 40 INJECTION, POWDER, FOR SOLUTION INTRAVENOUS at 15:17

## 2019-06-29 RX ADMIN — Medication SCH MG: at 08:31

## 2019-06-29 RX ADMIN — APIXABAN SCH MG: 5 TABLET, FILM COATED ORAL at 17:02

## 2019-06-29 RX ADMIN — INSULIN HUMAN SCH UNIT: 100 INJECTION, SOLUTION PARENTERAL at 21:46

## 2019-06-29 RX ADMIN — INSULIN HUMAN SCH UNIT: 100 INJECTION, SOLUTION PARENTERAL at 07:43

## 2019-06-29 RX ADMIN — NYSTATIN SCH GM: 100000 POWDER TOPICAL at 09:42

## 2019-06-29 RX ADMIN — APIXABAN SCH MG: 5 TABLET, FILM COATED ORAL at 08:31

## 2019-06-29 RX ADMIN — BUMETANIDE SCH MG: 0.25 INJECTION INTRAMUSCULAR; INTRAVENOUS at 08:31

## 2019-06-29 RX ADMIN — INSULIN GLARGINE SCH UNITS: 100 INJECTION, SOLUTION SUBCUTANEOUS at 21:44

## 2019-06-29 RX ADMIN — PANTOPRAZOLE SODIUM SCH MLS/HR: 40 INJECTION, POWDER, FOR SOLUTION INTRAVENOUS at 20:55

## 2019-06-29 RX ADMIN — ZINC SULFATE CAP 220 MG (50 MG ELEMENTAL ZN) SCH MG: 220 (50 ZN) CAP at 08:31

## 2019-06-29 RX ADMIN — METHYLPREDNISOLONE SODIUM SUCCINATE SCH MG: 40 INJECTION, POWDER, LYOPHILIZED, FOR SOLUTION INTRAMUSCULAR; INTRAVENOUS at 08:31

## 2019-06-29 RX ADMIN — PANTOPRAZOLE SODIUM SCH MLS/HR: 40 INJECTION, POWDER, FOR SOLUTION INTRAVENOUS at 00:13

## 2019-06-29 RX ADMIN — EZETIMIBE SCH MG: 10 TABLET ORAL at 08:31

## 2019-06-29 NOTE — NUR
Nutrition Follow-up Note



RD Recommendation(s) for Physician: 

-Continue current diet as ordered



Plan of Care: RD following, monitoring for tolerance and adequacy  



Nutrition reason for involvement:

Follow up 



RD Assessment

6/29  Visited pt in the room. Pt reported good appetite with 100% observed lunch intake. No 
GI complains reported. Pt denied any nausea or vomiting. Weight has been stable. Current 
diet is appropriate and adequate. Will continue to monitor and follow. 

6/26  59yo F, who was admitted for respiratory failure. Pt was discussed during AM rounds. 
Currently intubated and ventilated. Propofol was running at 21.6mL/hr (570kcal). Plan to 
wean off the vent today. No pressor meds were noted. OGT was present. IVF at 5mL/hr. Will 
continue to monitor and follow. 



Principal Problems/Diagnoses: 

1. Acute-on-chronic hypercapnic respiratory failure.

2. Acute-on-chronic diastolic heart failure.



PMH:

1. Obesity hypoventilation syndrome.

2. Chronic obstructive sleep apnea.

3. Chronic diastolic heart failure.

4. Paroxysmal atrial fibrillation.

5. Diabetes, requiring insulin at home.



GI: abdomen soft, non-tender, large 



Skin: no pressure wound noted 



Labs: 

(6/29) BUN 52 H, Creatinine 2.01 H, Glucose 194 H 

(6/26) K 3.3 L, BUN 57 H, Creatinine 1.89 H, Glucose 137 H, Mg 2.5 H 



Meds: insulin, protonix, diuretics, solu-medrol, eliquis, Oscal D, zinc sulfate, vitamin C  



Ht: 63in          

Wt: 250lb; 224lb           

BMI: 44.3kg/m2          

IBW: 115lb      

Malnutrition Evaluation (6/29/2019)

The patient does not meet criteria for a specified degree of malnutrition at this time. Will 
re-evaluate at follow-up as appropriate. 



Malnutrition Evaluation (6/26/2019)

Unable to evaluate. 



Nutrition Prescription (Diet Order): ADA diet 



Estimated Nutritional Needs:

Calories: 2280  2850kcal     (20-25kcal/kg/d) Weight used: CBW 

Protein: 114  137g     (1-1.2g/kg/d) Weight used: CBW



Diet Adequacy:

meeting calorie needs,  meeting protein needs



Diet Education Needs Assessment:

Diet education indicated, but patient not appropriate for education at this time. 



Nutrition Care Level: low 



Nutrition Diagnosis: No nutrition diagnosis at this time.  



Goal: Patient will meet % of estimated needs by follow up 



Progress: Goal met 



Interventions:

Modified diet 



Monitoring/Evaluation:

Total energy intake, Total protein intake, Modified diet, weight change 



Signed: Sridevi Dee MS, RD, LD

## 2019-06-29 NOTE — NUR
Patient Spo2 drop 82% due obstructive sleep apnea. RT increased O2 @ 8liters in home BIPAP 
at this time. Will continue to monitor.

## 2019-06-29 NOTE — NUR
Disconnected Home BIPAP at this time. Connected to 5lierts O2 via nasal canula. Respiration 
even and unlabored. Will continue to monitor.

## 2019-06-30 VITALS — DIASTOLIC BLOOD PRESSURE: 89 MMHG | SYSTOLIC BLOOD PRESSURE: 143 MMHG

## 2019-06-30 VITALS — DIASTOLIC BLOOD PRESSURE: 80 MMHG | SYSTOLIC BLOOD PRESSURE: 130 MMHG

## 2019-06-30 VITALS — SYSTOLIC BLOOD PRESSURE: 158 MMHG | DIASTOLIC BLOOD PRESSURE: 70 MMHG

## 2019-06-30 VITALS — SYSTOLIC BLOOD PRESSURE: 143 MMHG | DIASTOLIC BLOOD PRESSURE: 64 MMHG

## 2019-06-30 VITALS — DIASTOLIC BLOOD PRESSURE: 64 MMHG | SYSTOLIC BLOOD PRESSURE: 135 MMHG

## 2019-06-30 VITALS — SYSTOLIC BLOOD PRESSURE: 149 MMHG | DIASTOLIC BLOOD PRESSURE: 66 MMHG

## 2019-06-30 VITALS — DIASTOLIC BLOOD PRESSURE: 66 MMHG | SYSTOLIC BLOOD PRESSURE: 149 MMHG

## 2019-06-30 LAB
ANION GAP SERPL CALC-SCNC: 17.7 MMOL/L (ref 8–16)
BASOPHILS # BLD AUTO: 0 10*3/UL (ref 0–0.1)
BASOPHILS NFR BLD AUTO: 0.2 % (ref 0–1)
BUN SERPL-MCNC: 57 MG/DL (ref 7–26)
BUN/CREAT SERPL: 27 (ref 6–25)
CALCIUM SERPL-MCNC: 9.3 MG/DL (ref 8.4–10.2)
CHLORIDE SERPL-SCNC: 100 MMOL/L (ref 98–107)
CO2 SERPL-SCNC: 27 MMOL/L (ref 22–29)
DEPRECATED NEUTROPHILS # BLD AUTO: 8.1 10*3/UL (ref 2.1–6.9)
EGFRCR SERPLBLD CKD-EPI 2021: 24 ML/MIN (ref 60–?)
EOSINOPHIL # BLD AUTO: 0 10*3/UL (ref 0–0.4)
EOSINOPHIL NFR BLD AUTO: 0.1 % (ref 0–6)
ERYTHROCYTE [DISTWIDTH] IN CORD BLOOD: 13.8 % (ref 11.7–14.4)
GLUCOSE SERPLBLD-MCNC: 307 MG/DL (ref 74–118)
HCT VFR BLD AUTO: 33.2 % (ref 34.2–44.1)
HGB BLD-MCNC: 10.4 G/DL (ref 12–16)
LYMPHOCYTES # BLD: 0.6 10*3/UL (ref 1–3.2)
LYMPHOCYTES NFR BLD AUTO: 6.3 % (ref 18–39.1)
MAGNESIUM SERPL-MCNC: 2.2 MG/DL (ref 1.3–2.1)
MCH RBC QN AUTO: 31 PG (ref 28–32)
MCHC RBC AUTO-ENTMCNC: 31.3 G/DL (ref 31–35)
MCV RBC AUTO: 98.8 FL (ref 81–99)
MONOCYTES # BLD AUTO: 0.2 10*3/UL (ref 0.2–0.8)
MONOCYTES NFR BLD AUTO: 2 % (ref 4.4–11.3)
NEUTS SEG NFR BLD AUTO: 91 % (ref 38.7–80)
PLATELET # BLD AUTO: 199 X10E3/UL (ref 140–360)
POTASSIUM SERPL-SCNC: 4.7 MMOL/L (ref 3.5–5.1)
RBC # BLD AUTO: 3.36 X10E6/UL (ref 3.6–5.1)
SODIUM SERPL-SCNC: 140 MMOL/L (ref 136–145)

## 2019-06-30 RX ADMIN — INSULIN HUMAN SCH UNIT: 100 INJECTION, SOLUTION PARENTERAL at 07:33

## 2019-06-30 RX ADMIN — METHYLPREDNISOLONE SODIUM SUCCINATE SCH MG: 40 INJECTION, POWDER, LYOPHILIZED, FOR SOLUTION INTRAMUSCULAR; INTRAVENOUS at 09:11

## 2019-06-30 RX ADMIN — IPRATROPIUM BROMIDE AND ALBUTEROL SULFATE SCH ML: .5; 2.5 SOLUTION RESPIRATORY (INHALATION) at 03:00

## 2019-06-30 RX ADMIN — PANTOPRAZOLE SODIUM SCH MLS/HR: 40 INJECTION, POWDER, FOR SOLUTION INTRAVENOUS at 02:30

## 2019-06-30 RX ADMIN — PANTOPRAZOLE SODIUM SCH MLS/HR: 40 INJECTION, POWDER, FOR SOLUTION INTRAVENOUS at 06:14

## 2019-06-30 RX ADMIN — BUMETANIDE SCH MG: 0.25 INJECTION INTRAMUSCULAR; INTRAVENOUS at 09:11

## 2019-06-30 RX ADMIN — INSULIN HUMAN SCH UNIT: 100 INJECTION, SOLUTION PARENTERAL at 11:53

## 2019-06-30 RX ADMIN — AMIODARONE HYDROCHLORIDE SCH MG: 200 TABLET ORAL at 09:11

## 2019-06-30 RX ADMIN — IPRATROPIUM BROMIDE AND ALBUTEROL SULFATE SCH ML: .5; 2.5 SOLUTION RESPIRATORY (INHALATION) at 15:56

## 2019-06-30 RX ADMIN — IPRATROPIUM BROMIDE AND ALBUTEROL SULFATE SCH ML: .5; 2.5 SOLUTION RESPIRATORY (INHALATION) at 23:00

## 2019-06-30 RX ADMIN — HYDROCODONE BITARTRATE AND ACETAMINOPHEN PRN EA: 10; 325 TABLET ORAL at 11:00

## 2019-06-30 RX ADMIN — INSULIN HUMAN SCH UNIT: 100 INJECTION, SOLUTION PARENTERAL at 21:00

## 2019-06-30 RX ADMIN — IPRATROPIUM BROMIDE AND ALBUTEROL SULFATE SCH ML: .5; 2.5 SOLUTION RESPIRATORY (INHALATION) at 11:18

## 2019-06-30 RX ADMIN — Medication SCH MG: at 17:58

## 2019-06-30 RX ADMIN — PANTOPRAZOLE SODIUM SCH MG: 40 TABLET, DELAYED RELEASE ORAL at 07:18

## 2019-06-30 RX ADMIN — OXYCODONE HYDROCHLORIDE AND ACETAMINOPHEN SCH MG: 500 TABLET ORAL at 17:58

## 2019-06-30 RX ADMIN — APIXABAN SCH MG: 5 TABLET, FILM COATED ORAL at 09:11

## 2019-06-30 RX ADMIN — ZINC SULFATE CAP 220 MG (50 MG ELEMENTAL ZN) SCH MG: 220 (50 ZN) CAP at 09:11

## 2019-06-30 RX ADMIN — NYSTATIN SCH GM: 100000 POWDER TOPICAL at 17:58

## 2019-06-30 RX ADMIN — AMLODIPINE BESYLATE SCH MG: 10 TABLET ORAL at 09:11

## 2019-06-30 RX ADMIN — NYSTATIN SCH GM: 100000 POWDER TOPICAL at 09:11

## 2019-06-30 RX ADMIN — IPRATROPIUM BROMIDE AND ALBUTEROL SULFATE SCH ML: .5; 2.5 SOLUTION RESPIRATORY (INHALATION) at 07:45

## 2019-06-30 RX ADMIN — PANTOPRAZOLE SODIUM SCH MG: 40 TABLET, DELAYED RELEASE ORAL at 07:30

## 2019-06-30 RX ADMIN — EZETIMIBE SCH MG: 10 TABLET ORAL at 09:11

## 2019-06-30 RX ADMIN — INSULIN HUMAN SCH UNIT: 100 INJECTION, SOLUTION PARENTERAL at 16:40

## 2019-06-30 RX ADMIN — ZINC SULFATE CAP 220 MG (50 MG ELEMENTAL ZN) SCH MG: 220 (50 ZN) CAP at 17:58

## 2019-06-30 RX ADMIN — HYDROCODONE BITARTRATE AND ACETAMINOPHEN PRN EA: 10; 325 TABLET ORAL at 23:28

## 2019-06-30 RX ADMIN — HYDROCODONE BITARTRATE AND ACETAMINOPHEN PRN EA: 10; 325 TABLET ORAL at 17:50

## 2019-06-30 RX ADMIN — METHYLPREDNISOLONE SODIUM SUCCINATE SCH MG: 40 INJECTION, POWDER, LYOPHILIZED, FOR SOLUTION INTRAMUSCULAR; INTRAVENOUS at 21:00

## 2019-06-30 RX ADMIN — APIXABAN SCH MG: 5 TABLET, FILM COATED ORAL at 17:58

## 2019-06-30 RX ADMIN — OXYCODONE HYDROCHLORIDE AND ACETAMINOPHEN SCH MG: 500 TABLET ORAL at 09:11

## 2019-06-30 RX ADMIN — IPRATROPIUM BROMIDE AND ALBUTEROL SULFATE SCH ML: .5; 2.5 SOLUTION RESPIRATORY (INHALATION) at 20:00

## 2019-06-30 RX ADMIN — Medication SCH MG: at 09:11

## 2019-06-30 NOTE — NUR
RECEIVED PT LYING IN BED WITH EYES CLOSED, RESP EVEN AND UNLABORED, ON 5LPM O2 VIA NC. 
ORIENTED TO ROOM AND USE OF CALL LIGHT. CALL LIGHT WITHIN REACH.

## 2019-06-30 NOTE — DIAGNOSTIC IMAGING REPORT
EXAMINATION:  CHEST 2 VIEWS    



INDICATION: CHF



COMPARISON:  Chest radiograph 6/26/2019. 

     

FINDINGS:

TUBES and LINES:  Interval extubation. Interval removal of enteric tube.



LUNGS: Low lung volumes. Interval decrease in bilateral interstitial and

airspace opacities. Mild patchy bibasilar opacities. Central vascular

congestion. No new consolidation. 



PLEURA: Interval decrease in small bilateral pleural effusions. No evidence of

pneumothorax.



HEART AND MEDIASTINUM:  The cardiomediastinal silhouette is mildly enlarged.



BONES AND SOFT TISSUES:  No acute osseous abnormality.



UPPER ABDOMEN: No free air under the diaphragm.    



IMPRESSION: 

Cardiomegaly with interval decrease in pulmonary interstitial edema and small

bilateral pleural effusions.



Interval extubation and interval removal of enteric tube. 



Signed by: Dr. Kylie Do MD on 6/30/2019 7:24 AM

## 2019-06-30 NOTE — NUR
Visit made by the Spiritual Care Department Pastoral Visitor, Reymundo Reyna. PV provided 
pastoral presence, prayer, hospitality, and supportive listening. 

Pastoral Visitor informed pt/family of the scope of Chaplaincy Services and availability.



DOROTHY OLIVEIRA



Spiritual Care Department

O: [phone]

Pager: [phone] ([phone] calling from)

## 2019-07-01 VITALS — DIASTOLIC BLOOD PRESSURE: 66 MMHG | SYSTOLIC BLOOD PRESSURE: 145 MMHG

## 2019-07-01 VITALS — DIASTOLIC BLOOD PRESSURE: 57 MMHG | SYSTOLIC BLOOD PRESSURE: 124 MMHG

## 2019-07-01 VITALS — DIASTOLIC BLOOD PRESSURE: 63 MMHG | SYSTOLIC BLOOD PRESSURE: 146 MMHG

## 2019-07-01 VITALS — SYSTOLIC BLOOD PRESSURE: 136 MMHG | DIASTOLIC BLOOD PRESSURE: 63 MMHG

## 2019-07-01 VITALS — SYSTOLIC BLOOD PRESSURE: 144 MMHG | DIASTOLIC BLOOD PRESSURE: 59 MMHG

## 2019-07-01 LAB
ANION GAP SERPL CALC-SCNC: 14.8 MMOL/L (ref 8–16)
BASOPHILS # BLD AUTO: 0 10*3/UL (ref 0–0.1)
BASOPHILS NFR BLD AUTO: 0.1 % (ref 0–1)
BUN SERPL-MCNC: 56 MG/DL (ref 7–26)
BUN/CREAT SERPL: 30 (ref 6–25)
CALCIUM SERPL-MCNC: 9.3 MG/DL (ref 8.4–10.2)
CHLORIDE SERPL-SCNC: 101 MMOL/L (ref 98–107)
CO2 SERPL-SCNC: 29 MMOL/L (ref 22–29)
DEPRECATED NEUTROPHILS # BLD AUTO: 8.1 10*3/UL (ref 2.1–6.9)
EGFRCR SERPLBLD CKD-EPI 2021: 27 ML/MIN (ref 60–?)
EOSINOPHIL # BLD AUTO: 0 10*3/UL (ref 0–0.4)
EOSINOPHIL NFR BLD AUTO: 0.1 % (ref 0–6)
ERYTHROCYTE [DISTWIDTH] IN CORD BLOOD: 13.6 % (ref 11.7–14.4)
GLUCOSE SERPLBLD-MCNC: 230 MG/DL (ref 74–118)
HCT VFR BLD AUTO: 31.7 % (ref 34.2–44.1)
HGB BLD-MCNC: 10.1 G/DL (ref 12–16)
LYMPHOCYTES # BLD: 0.8 10*3/UL (ref 1–3.2)
LYMPHOCYTES NFR BLD AUTO: 8.6 % (ref 18–39.1)
MCH RBC QN AUTO: 30.8 PG (ref 28–32)
MCHC RBC AUTO-ENTMCNC: 31.9 G/DL (ref 31–35)
MCV RBC AUTO: 96.6 FL (ref 81–99)
MONOCYTES # BLD AUTO: 0.3 10*3/UL (ref 0.2–0.8)
MONOCYTES NFR BLD AUTO: 3.3 % (ref 4.4–11.3)
NEUTS SEG NFR BLD AUTO: 86.9 % (ref 38.7–80)
PLATELET # BLD AUTO: 204 X10E3/UL (ref 140–360)
POTASSIUM SERPL-SCNC: 4.8 MMOL/L (ref 3.5–5.1)
RBC # BLD AUTO: 3.28 X10E6/UL (ref 3.6–5.1)
SODIUM SERPL-SCNC: 140 MMOL/L (ref 136–145)

## 2019-07-01 RX ADMIN — ZINC SULFATE CAP 220 MG (50 MG ELEMENTAL ZN) SCH MG: 220 (50 ZN) CAP at 09:14

## 2019-07-01 RX ADMIN — INSULIN HUMAN SCH UNIT: 100 INJECTION, SOLUTION PARENTERAL at 15:44

## 2019-07-01 RX ADMIN — NYSTATIN SCH GM: 100000 POWDER TOPICAL at 15:43

## 2019-07-01 RX ADMIN — OXYCODONE HYDROCHLORIDE AND ACETAMINOPHEN SCH MG: 500 TABLET ORAL at 15:43

## 2019-07-01 RX ADMIN — IPRATROPIUM BROMIDE AND ALBUTEROL SULFATE SCH ML: .5; 2.5 SOLUTION RESPIRATORY (INHALATION) at 15:15

## 2019-07-01 RX ADMIN — NYSTATIN SCH GM: 100000 POWDER TOPICAL at 09:14

## 2019-07-01 RX ADMIN — PANTOPRAZOLE SODIUM SCH MG: 40 TABLET, DELAYED RELEASE ORAL at 09:13

## 2019-07-01 RX ADMIN — APIXABAN SCH MG: 5 TABLET, FILM COATED ORAL at 15:43

## 2019-07-01 RX ADMIN — HYDROCODONE BITARTRATE AND ACETAMINOPHEN PRN EA: 10; 325 TABLET ORAL at 06:08

## 2019-07-01 RX ADMIN — AMLODIPINE BESYLATE SCH MG: 10 TABLET ORAL at 09:14

## 2019-07-01 RX ADMIN — AMIODARONE HYDROCHLORIDE SCH MG: 200 TABLET ORAL at 09:13

## 2019-07-01 RX ADMIN — IPRATROPIUM BROMIDE AND ALBUTEROL SULFATE SCH ML: .5; 2.5 SOLUTION RESPIRATORY (INHALATION) at 07:18

## 2019-07-01 RX ADMIN — ZINC SULFATE CAP 220 MG (50 MG ELEMENTAL ZN) SCH MG: 220 (50 ZN) CAP at 15:43

## 2019-07-01 RX ADMIN — OXYCODONE HYDROCHLORIDE AND ACETAMINOPHEN SCH MG: 500 TABLET ORAL at 09:14

## 2019-07-01 RX ADMIN — METHYLPREDNISOLONE SODIUM SUCCINATE SCH MG: 40 INJECTION, POWDER, LYOPHILIZED, FOR SOLUTION INTRAMUSCULAR; INTRAVENOUS at 09:13

## 2019-07-01 RX ADMIN — HYDROCODONE BITARTRATE AND ACETAMINOPHEN PRN EA: 10; 325 TABLET ORAL at 15:48

## 2019-07-01 RX ADMIN — INSULIN HUMAN SCH UNIT: 100 INJECTION, SOLUTION PARENTERAL at 12:05

## 2019-07-01 RX ADMIN — IPRATROPIUM BROMIDE AND ALBUTEROL SULFATE SCH ML: .5; 2.5 SOLUTION RESPIRATORY (INHALATION) at 02:00

## 2019-07-01 RX ADMIN — APIXABAN SCH MG: 5 TABLET, FILM COATED ORAL at 09:13

## 2019-07-01 RX ADMIN — BUMETANIDE SCH MG: 0.25 INJECTION INTRAMUSCULAR; INTRAVENOUS at 09:13

## 2019-07-01 RX ADMIN — INSULIN HUMAN SCH UNIT: 100 INJECTION, SOLUTION PARENTERAL at 09:14

## 2019-07-01 RX ADMIN — Medication SCH MG: at 09:14

## 2019-07-01 RX ADMIN — Medication SCH MG: at 15:43

## 2019-07-01 RX ADMIN — IPRATROPIUM BROMIDE AND ALBUTEROL SULFATE SCH ML: .5; 2.5 SOLUTION RESPIRATORY (INHALATION) at 11:01

## 2019-07-01 RX ADMIN — EZETIMIBE SCH MG: 10 TABLET ORAL at 09:14

## 2019-07-01 NOTE — NUR
DISCHARGE DISPOSITION:



PATIENT DISCHARGE HOME WITH HOME HEALTHSERVICES AND DURABLE MEDICAL EQUIPMENT ( BEDSIDE 
COMMODE) FROM THE FOLLOWING:



[facility]

PHONE: [phone]

FAX: [phone]



[place] [facility]

PHONE:[phone]

FAX: [phone]  [phone]

## 2019-07-01 NOTE — NUR
Right wrist IV discontinued. No signs of infiltration noted. 2x2 gauze and tape placed. 
Taken via wheelchair to personal car. Accompanied by daughter. AAOX4 to time, person, place, 
situation. 5L NC via personal tank. Discharge instructions, rx, and all personal belongings 
taken with patient.

## 2019-07-01 NOTE — NUR
Catrina Martinez NP aware of two rx for steroids available (one from Catrina ARGUETA and another from 
) upon discharge. Clarification received. Rx from NP to be given to patient.

## 2019-07-01 NOTE — NUR
CM SPOKE TO PATIENT AT BEDSIDE REGARDING IMM LETTER. IMM LETTER GIVEN WITH EXPLANATION BASED 
ON ANTICIPATED DISCHARGE DATE. ORIGINAL SIGNED AND PLACED IN CHART. CM CONTACT INFORMATION 
GIVEN TO PATIENT FOR ANY NEEDS OR CONCERNS. PATIENT WITH NO FURTHER QUESTIONS.

## 2019-07-03 NOTE — DISCHARGE SUMMARY
ADMISSION DIAGNOSES:  Acute-on-chronic hypercapnic respiratory failure, acute-on-chronic

diastolic congestive heart failure, type 2 diabetes complicated by chronic kidney

disease 3, hypertension complicated by chronic kidney disease 3, chronic kidney disease

3, paroxysmal atrial fibrillation, morbid obesity. 

 

DISCHARGE DIAGNOSES:  Acute-on-chronic hypercapnic respiratory failure, acute-on-chronic

diastolic congestive heart failure, type 2 diabetes complicated by chronic kidney

disease 3, hypertension complicated by chronic kidney disease 3, chronic kidney disease

3, paroxysmal atrial fibrillation, morbid obesity. 

 

HISTORY:  The patient has a history of COPD, type 2 diabetes, CKD 3, PAF, MARIO,

hypertension, chronic diastolic CHF. 

 

SURGICAL HISTORY:  Noncontributory.

 

HOSPITAL COURSE:  A 58-year-old female was noted to have increased dyspnea over the last

3 to 4 days.  She denies cough, fever, and chest pain.  Upon admission to the ER, ABG

found CO2 of 87.  She was put on BiPAP, but did not respond, so she was intubated and

moved to the ICU.  On admission, her BNP was 13.8.  Chest x-ray showed CHF worse when

compared with the prior exam.  Ultrasound of the chest showed trace right pleural

effusion.  The patient was extubated and put on BiPAP, started on Levaquin and nebs.

Pulmonary was consulted.  She was given IV Solu-Medrol and started on Bumex.  Then, she

began having bloody dark emesis, so she was started on Protonix drip and the emesis

appeared to become brown, so a KUB was ordered as she could not tell me when her last

bowel movement was, but the KUB was negative.  Sputum came back positive for MRSA, but

there was a colonization according to ID.  Urine culture negative.  Blood culture

negative.  Her stool for blood was positive, but her hemoglobin remained stable

throughout hospitalization.  Protonix drip was stopped.  The patient is tolerating diet,

on 5 L nasal cannula, which she uses at home and is ready for discharge.  She was

discharged home with a prescription for Norvasc, Bumex, Os-Red D, Protonix, and tapered

dose of prednisone.  The patient understands discharge instructions and agrees to plan.

She will follow up with primary care in 1 to 2 weeks and Pulmonology as discussed. 

 

 

Dictated by Catrina Martinez NP

 

______________________________

MD ROBBIE Zamudio/CESILIA

D:  07/03/2019 10:15:15

T:  07/03/2019 23:09:02

Job #:  [id number]

## 2019-12-12 ENCOUNTER — HOSPITAL ENCOUNTER (INPATIENT)
Dept: HOSPITAL 88 - ER | Age: 59
LOS: 6 days | Discharge: NURSING, REHAB OR LONG-TERM CARE | DRG: 291 | End: 2019-12-18
Attending: INTERNAL MEDICINE | Admitting: INTERNAL MEDICINE
Payer: MEDICARE

## 2019-12-12 VITALS — BODY MASS INDEX: 31.55 KG/M2 | WEIGHT: 178.06 LBS | HEIGHT: 63 IN

## 2019-12-12 DIAGNOSIS — I13.0: Primary | ICD-10-CM

## 2019-12-12 DIAGNOSIS — G47.33: ICD-10-CM

## 2019-12-12 DIAGNOSIS — Z88.0: ICD-10-CM

## 2019-12-12 DIAGNOSIS — E87.5: ICD-10-CM

## 2019-12-12 DIAGNOSIS — Z79.01: ICD-10-CM

## 2019-12-12 DIAGNOSIS — I48.0: ICD-10-CM

## 2019-12-12 DIAGNOSIS — E04.1: ICD-10-CM

## 2019-12-12 DIAGNOSIS — I10: ICD-10-CM

## 2019-12-12 DIAGNOSIS — E11.22: ICD-10-CM

## 2019-12-12 DIAGNOSIS — I27.20: ICD-10-CM

## 2019-12-12 DIAGNOSIS — I50.33: ICD-10-CM

## 2019-12-12 DIAGNOSIS — J44.1: ICD-10-CM

## 2019-12-12 DIAGNOSIS — Z88.2: ICD-10-CM

## 2019-12-12 DIAGNOSIS — J96.21: ICD-10-CM

## 2019-12-12 DIAGNOSIS — N18.3: ICD-10-CM

## 2019-12-12 DIAGNOSIS — E66.01: ICD-10-CM

## 2019-12-12 LAB
ALBUMIN SERPL-MCNC: 3.1 G/DL (ref 3.5–5)
ALBUMIN/GLOB SERPL: 0.9 {RATIO} (ref 0.8–2)
ALP SERPL-CCNC: 107 IU/L (ref 40–150)
ALT SERPL-CCNC: 13 IU/L (ref 0–55)
ANION GAP SERPL CALC-SCNC: 14.9 MMOL/L (ref 8–16)
BASOPHILS # BLD AUTO: 0 10*3/UL (ref 0–0.1)
BASOPHILS NFR BLD AUTO: 0.4 % (ref 0–1)
BUN SERPL-MCNC: 52 MG/DL (ref 7–26)
BUN/CREAT SERPL: 32 (ref 6–25)
CALCIUM SERPL-MCNC: 9.3 MG/DL (ref 8.4–10.2)
CHLORIDE SERPL-SCNC: 100 MMOL/L (ref 98–107)
CK MB SERPL-MCNC: 2.5 NG/ML (ref 0–5)
CK SERPL-CCNC: 44 IU/L (ref 29–168)
CO2 SERPL-SCNC: 29 MMOL/L (ref 22–29)
DEPRECATED NEUTROPHILS # BLD AUTO: 7.6 10*3/UL (ref 2.1–6.9)
EGFRCR SERPLBLD CKD-EPI 2021: 33 ML/MIN (ref 60–?)
EOSINOPHIL # BLD AUTO: 0.2 10*3/UL (ref 0–0.4)
EOSINOPHIL NFR BLD AUTO: 2 % (ref 0–6)
ERYTHROCYTE [DISTWIDTH] IN CORD BLOOD: 14.5 % (ref 11.7–14.4)
GLOBULIN PLAS-MCNC: 3.6 G/DL (ref 2.3–3.5)
GLUCOSE SERPLBLD-MCNC: 153 MG/DL (ref 74–118)
HCT VFR BLD AUTO: 32 % (ref 34.2–44.1)
HGB BLD-MCNC: 9.9 G/DL (ref 12–16)
LYMPHOCYTES # BLD: 1.4 10*3/UL (ref 1–3.2)
LYMPHOCYTES NFR BLD AUTO: 14.1 % (ref 18–39.1)
MCH RBC QN AUTO: 29.4 PG (ref 28–32)
MCHC RBC AUTO-ENTMCNC: 30.9 G/DL (ref 31–35)
MCV RBC AUTO: 95 FL (ref 81–99)
MONOCYTES # BLD AUTO: 0.5 10*3/UL (ref 0.2–0.8)
MONOCYTES NFR BLD AUTO: 4.9 % (ref 4.4–11.3)
NEUTS SEG NFR BLD AUTO: 78.2 % (ref 38.7–80)
PLATELET # BLD AUTO: 192 X10E3/UL (ref 140–360)
POTASSIUM SERPL-SCNC: 4.9 MMOL/L (ref 3.5–5.1)
RBC # BLD AUTO: 3.37 X10E6/UL (ref 3.6–5.1)
SODIUM SERPL-SCNC: 139 MMOL/L (ref 136–145)

## 2019-12-12 PROCEDURE — 80053 COMPREHEN METABOLIC PANEL: CPT

## 2019-12-12 PROCEDURE — 82550 ASSAY OF CK (CPK): CPT

## 2019-12-12 PROCEDURE — 94664 DEMO&/EVAL PT USE INHALER: CPT

## 2019-12-12 PROCEDURE — 82607 VITAMIN B-12: CPT

## 2019-12-12 PROCEDURE — 97139 UNLISTED THERAPEUTIC PX: CPT

## 2019-12-12 PROCEDURE — 87070 CULTURE OTHR SPECIMN AEROBIC: CPT

## 2019-12-12 PROCEDURE — 83735 ASSAY OF MAGNESIUM: CPT

## 2019-12-12 PROCEDURE — 93005 ELECTROCARDIOGRAM TRACING: CPT

## 2019-12-12 PROCEDURE — 94660 CPAP INITIATION&MGMT: CPT

## 2019-12-12 PROCEDURE — 71045 X-RAY EXAM CHEST 1 VIEW: CPT

## 2019-12-12 PROCEDURE — 81001 URINALYSIS AUTO W/SCOPE: CPT

## 2019-12-12 PROCEDURE — 82948 REAGENT STRIP/BLOOD GLUCOSE: CPT

## 2019-12-12 PROCEDURE — 76604 US EXAM CHEST: CPT

## 2019-12-12 PROCEDURE — 71250 CT THORAX DX C-: CPT

## 2019-12-12 PROCEDURE — 83540 ASSAY OF IRON: CPT

## 2019-12-12 PROCEDURE — 36415 COLL VENOUS BLD VENIPUNCTURE: CPT

## 2019-12-12 PROCEDURE — 84132 ASSAY OF SERUM POTASSIUM: CPT

## 2019-12-12 PROCEDURE — 84466 ASSAY OF TRANSFERRIN: CPT

## 2019-12-12 PROCEDURE — 83880 ASSAY OF NATRIURETIC PEPTIDE: CPT

## 2019-12-12 PROCEDURE — 93971 EXTREMITY STUDY: CPT

## 2019-12-12 PROCEDURE — 84484 ASSAY OF TROPONIN QUANT: CPT

## 2019-12-12 PROCEDURE — 80061 LIPID PANEL: CPT

## 2019-12-12 PROCEDURE — 82728 ASSAY OF FERRITIN: CPT

## 2019-12-12 PROCEDURE — 87400 INFLUENZA A/B EACH AG IA: CPT

## 2019-12-12 PROCEDURE — 82553 CREATINE MB FRACTION: CPT

## 2019-12-12 PROCEDURE — 94640 AIRWAY INHALATION TREATMENT: CPT

## 2019-12-12 PROCEDURE — 83036 HEMOGLOBIN GLYCOSYLATED A1C: CPT

## 2019-12-12 PROCEDURE — 87205 SMEAR GRAM STAIN: CPT

## 2019-12-12 PROCEDURE — 96372 THER/PROPH/DIAG INJ SC/IM: CPT

## 2019-12-12 PROCEDURE — 85025 COMPLETE CBC W/AUTO DIFF WBC: CPT

## 2019-12-12 PROCEDURE — 80048 BASIC METABOLIC PNL TOTAL CA: CPT

## 2019-12-12 PROCEDURE — 71046 X-RAY EXAM CHEST 2 VIEWS: CPT

## 2019-12-12 PROCEDURE — 99284 EMERGENCY DEPT VISIT MOD MDM: CPT

## 2019-12-12 PROCEDURE — 96367 TX/PROPH/DG ADDL SEQ IV INF: CPT

## 2019-12-12 PROCEDURE — 84443 ASSAY THYROID STIM HORMONE: CPT

## 2019-12-13 VITALS — SYSTOLIC BLOOD PRESSURE: 155 MMHG | DIASTOLIC BLOOD PRESSURE: 66 MMHG

## 2019-12-13 VITALS — SYSTOLIC BLOOD PRESSURE: 102 MMHG | DIASTOLIC BLOOD PRESSURE: 57 MMHG

## 2019-12-13 VITALS — DIASTOLIC BLOOD PRESSURE: 60 MMHG | SYSTOLIC BLOOD PRESSURE: 114 MMHG

## 2019-12-13 VITALS — DIASTOLIC BLOOD PRESSURE: 49 MMHG | SYSTOLIC BLOOD PRESSURE: 115 MMHG

## 2019-12-13 VITALS — SYSTOLIC BLOOD PRESSURE: 126 MMHG | DIASTOLIC BLOOD PRESSURE: 44 MMHG

## 2019-12-13 VITALS — SYSTOLIC BLOOD PRESSURE: 114 MMHG | DIASTOLIC BLOOD PRESSURE: 60 MMHG

## 2019-12-13 VITALS — SYSTOLIC BLOOD PRESSURE: 138 MMHG | DIASTOLIC BLOOD PRESSURE: 53 MMHG

## 2019-12-13 LAB
ALBUMIN SERPL-MCNC: 3 G/DL (ref 3.5–5)
ALBUMIN/GLOB SERPL: 0.8 {RATIO} (ref 0.8–2)
ALP SERPL-CCNC: 107 IU/L (ref 40–150)
ALT SERPL-CCNC: 12 IU/L (ref 0–55)
ANION GAP SERPL CALC-SCNC: 19.9 MMOL/L (ref 8–16)
BACTERIA URNS QL MICRO: (no result) /HPF
BASOPHILS # BLD AUTO: 0 10*3/UL (ref 0–0.1)
BASOPHILS NFR BLD AUTO: 0.1 % (ref 0–1)
BILIRUB UR QL: NEGATIVE
BUN SERPL-MCNC: 62 MG/DL (ref 7–26)
BUN/CREAT SERPL: 31 (ref 6–25)
CALCIUM SERPL-MCNC: 9.3 MG/DL (ref 8.4–10.2)
CHLORIDE SERPL-SCNC: 100 MMOL/L (ref 98–107)
CK MB SERPL-MCNC: 1.9 NG/ML (ref 0–5)
CK MB SERPL-MCNC: 2.8 NG/ML (ref 0–5)
CK SERPL-CCNC: 35 IU/L (ref 29–168)
CK SERPL-CCNC: 43 IU/L (ref 29–168)
CLARITY UR: CLEAR
CO2 SERPL-SCNC: 25 MMOL/L (ref 22–29)
COLOR UR: YELLOW
DEPRECATED NEUTROPHILS # BLD AUTO: 7.9 10*3/UL (ref 2.1–6.9)
DEPRECATED RBC URNS MANUAL-ACNC: (no result) /HPF (ref 0–5)
EGFRCR SERPLBLD CKD-EPI 2021: 26 ML/MIN (ref 60–?)
EOSINOPHIL # BLD AUTO: 0 10*3/UL (ref 0–0.4)
EOSINOPHIL NFR BLD AUTO: 0 % (ref 0–6)
EPI CELLS URNS QL MICRO: (no result) /LPF
ERYTHROCYTE [DISTWIDTH] IN CORD BLOOD: 14.4 % (ref 11.7–14.4)
GLOBULIN PLAS-MCNC: 3.9 G/DL (ref 2.3–3.5)
GLUCOSE SERPLBLD-MCNC: 333 MG/DL (ref 74–118)
HCT VFR BLD AUTO: 32.1 % (ref 34.2–44.1)
HGB BLD-MCNC: 9.8 G/DL (ref 12–16)
KETONES UR QL STRIP.AUTO: NEGATIVE
LEUKOCYTE ESTERASE UR QL STRIP.AUTO: NEGATIVE
LYMPHOCYTES # BLD: 0.4 10*3/UL (ref 1–3.2)
LYMPHOCYTES NFR BLD AUTO: 4.7 % (ref 18–39.1)
MCH RBC QN AUTO: 29.5 PG (ref 28–32)
MCHC RBC AUTO-ENTMCNC: 30.5 G/DL (ref 31–35)
MCV RBC AUTO: 96.7 FL (ref 81–99)
MONOCYTES # BLD AUTO: 0 10*3/UL (ref 0.2–0.8)
MONOCYTES NFR BLD AUTO: 0.5 % (ref 4.4–11.3)
NEUTS SEG NFR BLD AUTO: 94.1 % (ref 38.7–80)
NITRITE UR QL STRIP.AUTO: NEGATIVE
PLATELET # BLD AUTO: 164 X10E3/UL (ref 140–360)
POTASSIUM SERPL-SCNC: 5.9 MMOL/L (ref 3.5–5.1)
PROT UR QL STRIP.AUTO: NEGATIVE
RBC # BLD AUTO: 3.32 X10E6/UL (ref 3.6–5.1)
SODIUM SERPL-SCNC: 139 MMOL/L (ref 136–145)
SP GR UR STRIP: 1.02 (ref 1.01–1.02)
UROBILINOGEN UR STRIP-MCNC: 0.2 MG/DL (ref 0.2–1)
WBC #/AREA URNS HPF: (no result) /HPF (ref 0–5)

## 2019-12-13 RX ADMIN — APIXABAN SCH MG: 5 TABLET, FILM COATED ORAL at 16:52

## 2019-12-13 RX ADMIN — IPRATROPIUM BROMIDE AND ALBUTEROL SULFATE SCH ML: .5; 2.5 SOLUTION RESPIRATORY (INHALATION) at 07:25

## 2019-12-13 RX ADMIN — FUROSEMIDE SCH MG: 10 INJECTION, SOLUTION INTRAMUSCULAR; INTRAVENOUS at 08:59

## 2019-12-13 RX ADMIN — FUROSEMIDE SCH MG: 10 INJECTION, SOLUTION INTRAMUSCULAR; INTRAVENOUS at 16:52

## 2019-12-13 RX ADMIN — METHYLPREDNISOLONE SODIUM SUCCINATE SCH MG: 40 INJECTION, POWDER, LYOPHILIZED, FOR SOLUTION INTRAMUSCULAR; INTRAVENOUS at 12:03

## 2019-12-13 RX ADMIN — INSULIN GLARGINE SCH UNITS: 100 INJECTION, SOLUTION SUBCUTANEOUS at 16:53

## 2019-12-13 RX ADMIN — HYDROCODONE BITARTRATE AND ACETAMINOPHEN PRN EA: 5; 325 TABLET ORAL at 21:50

## 2019-12-13 RX ADMIN — INSULIN HUMAN SCH UNIT: 100 INJECTION, SOLUTION PARENTERAL at 16:51

## 2019-12-13 RX ADMIN — TIOTROPIUM BROMIDE SCH MCG: 18 CAPSULE ORAL; RESPIRATORY (INHALATION) at 10:00

## 2019-12-13 RX ADMIN — GUAIFENESIN AND DEXTROMETHORPHAN HYDROBROMIDE SCH EACH: 600; 30 TABLET, EXTENDED RELEASE ORAL at 16:52

## 2019-12-13 RX ADMIN — SODIUM CHLORIDE SCH MLS/HR: 900 INJECTION, SOLUTION INTRAVENOUS at 06:26

## 2019-12-13 RX ADMIN — Medication SCH MG: at 16:52

## 2019-12-13 RX ADMIN — IPRATROPIUM BROMIDE AND ALBUTEROL SULFATE SCH ML: .5; 2.5 SOLUTION RESPIRATORY (INHALATION) at 01:00

## 2019-12-13 RX ADMIN — APIXABAN SCH MG: 5 TABLET, FILM COATED ORAL at 08:59

## 2019-12-13 RX ADMIN — INSULIN GLARGINE SCH UNITS: 100 INJECTION, SOLUTION SUBCUTANEOUS at 08:18

## 2019-12-13 RX ADMIN — AMLODIPINE BESYLATE SCH MG: 10 TABLET ORAL at 09:00

## 2019-12-13 RX ADMIN — CEFTRIAXONE SCH MLS/HR: 100 INJECTION, POWDER, FOR SOLUTION INTRAVENOUS at 05:56

## 2019-12-13 RX ADMIN — HYDROCODONE BITARTRATE AND ACETAMINOPHEN PRN EA: 5; 325 TABLET ORAL at 09:01

## 2019-12-13 RX ADMIN — METHYLPREDNISOLONE SODIUM SUCCINATE SCH MG: 40 INJECTION, POWDER, LYOPHILIZED, FOR SOLUTION INTRAMUSCULAR; INTRAVENOUS at 00:07

## 2019-12-13 RX ADMIN — EZETIMIBE SCH MG: 10 TABLET ORAL at 12:15

## 2019-12-13 RX ADMIN — METHYLPREDNISOLONE SODIUM SUCCINATE SCH MG: 40 INJECTION, POWDER, LYOPHILIZED, FOR SOLUTION INTRAMUSCULAR; INTRAVENOUS at 05:28

## 2019-12-13 RX ADMIN — Medication SCH MG: at 12:15

## 2019-12-13 RX ADMIN — TRAMADOL HYDROCHLORIDE PRN MG: 50 TABLET, FILM COATED ORAL at 11:28

## 2019-12-13 RX ADMIN — HYDROCODONE BITARTRATE AND ACETAMINOPHEN PRN EA: 5; 325 TABLET ORAL at 15:56

## 2019-12-13 RX ADMIN — INSULIN HUMAN SCH UNIT: 100 INJECTION, SOLUTION PARENTERAL at 12:02

## 2019-12-13 RX ADMIN — IPRATROPIUM BROMIDE AND ALBUTEROL SULFATE SCH ML: .5; 2.5 SOLUTION RESPIRATORY (INHALATION) at 19:45

## 2019-12-13 RX ADMIN — INSULIN HUMAN SCH UNIT: 100 INJECTION, SOLUTION PARENTERAL at 08:18

## 2019-12-13 RX ADMIN — METHYLPREDNISOLONE SODIUM SUCCINATE SCH MG: 40 INJECTION, POWDER, LYOPHILIZED, FOR SOLUTION INTRAMUSCULAR; INTRAVENOUS at 23:41

## 2019-12-13 RX ADMIN — IPRATROPIUM BROMIDE AND ALBUTEROL SULFATE SCH ML: .5; 2.5 SOLUTION RESPIRATORY (INHALATION) at 14:30

## 2019-12-13 RX ADMIN — INSULIN HUMAN SCH UNIT: 100 INJECTION, SOLUTION PARENTERAL at 21:10

## 2019-12-13 RX ADMIN — FLUTICASONE PROPIONATE AND SALMETEROL SCH EA: 50; 250 POWDER RESPIRATORY (INHALATION) at 11:00

## 2019-12-13 RX ADMIN — GUAIFENESIN AND DEXTROMETHORPHAN HYDROBROMIDE SCH EACH: 600; 30 TABLET, EXTENDED RELEASE ORAL at 09:00

## 2019-12-13 NOTE — DIAGNOSTIC IMAGING REPORT
EXAM: Limited mediastinal ultrasound



INDICATION: Evaluate for pleural effusion.



COMPARISON: Chest radiograph 12/12/2019.



TECHNIQUE: Limited ultrasound was performed of the bilateral hemithoraces to

evaluate for pleural effusion.



FINDINGS/IMPRESSION:     

There are trace bilateral pleural effusions.



Signed by: Dr. Kylie Do MD on 12/13/2019 1:39 PM

## 2019-12-13 NOTE — NUR
Per MD/insulin order, MD called regarding BS < 350. - Spoke with Sarika ARGUETA, ordered to 
give 12 units and recheck in 2 hours.

## 2019-12-13 NOTE — NUR
Nutrition Screen Note



RD Recommendation for Physician: 

-Recommend low sodium/diabetic diet



Plan of Care: RD following, monitoring for tolerance and adequacy



Nutrition reason for involvement: Diagnosis - CHF



Primary Diagnose(s): CHF, COPD, hypoxia, and respiratory distress



PMH: obesity, hypoventilation syndrome, chronic obstructive sleep apnea, CHF, and diabetes



Ht: 63 in 

Wt:257 lb

BMI: 45.5 kg/m2

IBW:115 lb



RD Assessment:

(12/13/19) Chart reviewed. Labs and meds reviewed. Pt is a 59 year old female admitted with 
CHF, COPD, hypoxia, and respiratory distress. Pt stated she has been eating all of her 
meals. Pt also mentioned he weight typically fluctuates due to fluid but her usual dry 
weight is 235 lbs. Pt also reports some nausea. No chewing/swallowing issues. Will continue 
to monitor.



Current Diet: ADA diet



Malnutrition Evaluation (12/13/19)

The patient does not meet criteria for a specified degree of malnutrition at this time. Will 
re-evaluate at follow-up as appropriate. 





Diet Education Needs Assessment: Pt was interested in diet education 



Learner(s): pt

Barriers: No barriers identified 

Cultural/Language Modifications: No cultural/language modifications noted.

Readiness: willing

Method: explanation/ discussion, handout

Topics: Carbohydrate counting handouts, Reading the nutrition label, low sodium diet, weight 
management

Understanding/Compliance: pt verbalized understanding 





Nutrition Care Level: moderate





Signed: Yarelis Helm, RD, LD

## 2019-12-13 NOTE — CONSULTATION
DATE OF CONSULTATION:  12/13/2019

 

Pulmonary Consultation

 

Patient of Dr. Valladares, Dr. Lupillo Roy.

 

HISTORY OF PRESENT ILLNESS:  Charming 59-year-old woman, well known to the Pulmonary

Service with history of coronary disease, congestive heart failure, chronic respiratory

failure, on home mechanical ventilator support to use this faithfully and home oxygen.

History of congestive heart failure, obesity hypoventilation syndrome, chronic

respiratory failure.  Recently, she had a cough productive of yellow sputum and became

progressively short of breath despite antibiotic therapy with doxycycline.  She has a

history of diabetes. 

 

ALLERGIES:  SHE IS ALLERGIC TO SULFA, PENICILLIN, AND NAPROSYN.

 

HOME MEDICATIONS:  Have included Bumex, Zetia, Symbicort, magnesium oxide, short course

of prednisone, Protonix, Imitrex for headaches, Norvasc, DuoNeb, Spiriva, apixaban. 

 

SOCIAL HISTORY:  Smoked for 50 years pack a day, quit in 2017.  Worked as CNA.  History

of coronary stents 15 years ago. 

 

FAMILY HISTORY:  Positive for cancer, diabetes, dementia, sick sinus syndrome.

 

PHYSICAL EXAMINATION:

GENERAL:  Morbidly obese white female, awake and alert, on her trilogy ventilator. 

VITAL SIGNS:  Tidal volume 420, respiratory rate of 28 __________ temperature 97.8,

pulse 94, respirations 20, blood pressure 102/57. 

HEENT:  Normocephalic and atraumatic.  Eyes, extraocular movements intact. 

LUNGS:  Diminished breath sounds. 

HEART:  Regular rhythm. 

ABDOMEN:  Nontender. 

EXTREMITIES:  Trace edema.

IMPRESSION:  Respiratory failure, acute exacerbation of chronic obstructive pulmonary

disease, chronic kidney disease, elevated potassium.  We will hold potassium at this

time.  Consider Kayexalate.  Repeat potassium is elevated.  Continue Trilogy ventilator.

 Optimize bronchodilators.  Moderate dose corticosteroids in view of her diabetes and

attempt to wean rapidly.  Continue other home medications.  Discontinue regular doses of

potassium.  Continue bronchodilator. 

 

Thank you for this kind referral.

 

 

 

 

______________________________

Lupillo Muhammad MD

 

DS/MODL

D:  12/13/2019 09:06:17

T:  12/13/2019 17:07:49

Job #:  [id number]

## 2019-12-14 VITALS — DIASTOLIC BLOOD PRESSURE: 56 MMHG | SYSTOLIC BLOOD PRESSURE: 101 MMHG

## 2019-12-14 VITALS — DIASTOLIC BLOOD PRESSURE: 59 MMHG | SYSTOLIC BLOOD PRESSURE: 122 MMHG

## 2019-12-14 VITALS — SYSTOLIC BLOOD PRESSURE: 109 MMHG | DIASTOLIC BLOOD PRESSURE: 90 MMHG

## 2019-12-14 VITALS — DIASTOLIC BLOOD PRESSURE: 82 MMHG | SYSTOLIC BLOOD PRESSURE: 126 MMHG

## 2019-12-14 VITALS — SYSTOLIC BLOOD PRESSURE: 101 MMHG | DIASTOLIC BLOOD PRESSURE: 56 MMHG

## 2019-12-14 VITALS — SYSTOLIC BLOOD PRESSURE: 148 MMHG | DIASTOLIC BLOOD PRESSURE: 63 MMHG

## 2019-12-14 VITALS — DIASTOLIC BLOOD PRESSURE: 62 MMHG | SYSTOLIC BLOOD PRESSURE: 140 MMHG

## 2019-12-14 LAB
ANION GAP SERPL CALC-SCNC: 17.2 MMOL/L (ref 8–16)
BASOPHILS # BLD AUTO: 0 10*3/UL (ref 0–0.1)
BASOPHILS NFR BLD AUTO: 0.1 % (ref 0–1)
BUN SERPL-MCNC: 81 MG/DL (ref 7–26)
BUN/CREAT SERPL: 36 (ref 6–25)
CALCIUM SERPL-MCNC: 9.1 MG/DL (ref 8.4–10.2)
CHLORIDE SERPL-SCNC: 100 MMOL/L (ref 98–107)
CHOLEST SERPL-MCNC: 161 MD/DL (ref 0–199)
CHOLEST/HDLC SERPL: 2.4 {RATIO} (ref 3–3.6)
CO2 SERPL-SCNC: 27 MMOL/L (ref 22–29)
DEPRECATED NEUTROPHILS # BLD AUTO: 10 10*3/UL (ref 2.1–6.9)
EGFRCR SERPLBLD CKD-EPI 2021: 22 ML/MIN (ref 60–?)
EOSINOPHIL # BLD AUTO: 0 10*3/UL (ref 0–0.4)
EOSINOPHIL NFR BLD AUTO: 0 % (ref 0–6)
ERYTHROCYTE [DISTWIDTH] IN CORD BLOOD: 14.6 % (ref 11.7–14.4)
GLUCOSE SERPLBLD-MCNC: 303 MG/DL (ref 74–118)
HCT VFR BLD AUTO: 29.5 % (ref 34.2–44.1)
HDLC SERPL-MSCNC: 66 MG/DL (ref 40–60)
HGB BLD-MCNC: 8.9 G/DL (ref 12–16)
LDLC SERPL CALC-MCNC: 73 MG/DL (ref 60–130)
LYMPHOCYTES # BLD: 0.7 10*3/UL (ref 1–3.2)
LYMPHOCYTES NFR BLD AUTO: 6.2 % (ref 18–39.1)
MAGNESIUM SERPL-MCNC: 2.5 MG/DL (ref 1.3–2.1)
MCH RBC QN AUTO: 28.9 PG (ref 28–32)
MCHC RBC AUTO-ENTMCNC: 30.2 G/DL (ref 31–35)
MCV RBC AUTO: 95.8 FL (ref 81–99)
MONOCYTES # BLD AUTO: 0.4 10*3/UL (ref 0.2–0.8)
MONOCYTES NFR BLD AUTO: 3.8 % (ref 4.4–11.3)
NEUTS SEG NFR BLD AUTO: 89.2 % (ref 38.7–80)
PLATELET # BLD AUTO: 179 X10E3/UL (ref 140–360)
POTASSIUM SERPL-SCNC: 6.2 MMOL/L (ref 3.5–5.1)
RBC # BLD AUTO: 3.08 X10E6/UL (ref 3.6–5.1)
SODIUM SERPL-SCNC: 138 MMOL/L (ref 136–145)
TRIGL SERPL-MCNC: 108 MG/DL (ref 0–149)
TSH SERPL DL<=0.005 MIU/L-ACNC: 0.35 UIU/ML (ref 0.35–4.94)

## 2019-12-14 RX ADMIN — SODIUM CHLORIDE SCH MLS/HR: 900 INJECTION, SOLUTION INTRAVENOUS at 05:14

## 2019-12-14 RX ADMIN — INSULIN GLARGINE SCH UNITS: 100 INJECTION, SOLUTION SUBCUTANEOUS at 17:00

## 2019-12-14 RX ADMIN — IPRATROPIUM BROMIDE AND ALBUTEROL SULFATE SCH ML: .5; 2.5 SOLUTION RESPIRATORY (INHALATION) at 15:15

## 2019-12-14 RX ADMIN — FUROSEMIDE SCH MG: 10 INJECTION, SOLUTION INTRAMUSCULAR; INTRAVENOUS at 08:17

## 2019-12-14 RX ADMIN — Medication SCH MG: at 17:27

## 2019-12-14 RX ADMIN — INSULIN GLARGINE SCH UNITS: 100 INJECTION, SOLUTION SUBCUTANEOUS at 09:00

## 2019-12-14 RX ADMIN — IPRATROPIUM BROMIDE AND ALBUTEROL SULFATE SCH ML: .5; 2.5 SOLUTION RESPIRATORY (INHALATION) at 08:05

## 2019-12-14 RX ADMIN — INSULIN LISPRO SCH UNIT: 100 INJECTION, SOLUTION INTRAVENOUS; SUBCUTANEOUS at 07:30

## 2019-12-14 RX ADMIN — AMLODIPINE BESYLATE SCH MG: 10 TABLET ORAL at 08:17

## 2019-12-14 RX ADMIN — INSULIN LISPRO SCH UNIT: 100 INJECTION, SOLUTION INTRAVENOUS; SUBCUTANEOUS at 16:30

## 2019-12-14 RX ADMIN — CEFTRIAXONE SCH MLS/HR: 100 INJECTION, POWDER, FOR SOLUTION INTRAVENOUS at 04:45

## 2019-12-14 RX ADMIN — FLUTICASONE PROPIONATE AND SALMETEROL SCH EA: 50; 250 POWDER RESPIRATORY (INHALATION) at 08:16

## 2019-12-14 RX ADMIN — GUAIFENESIN AND DEXTROMETHORPHAN HYDROBROMIDE SCH EACH: 600; 30 TABLET, EXTENDED RELEASE ORAL at 17:27

## 2019-12-14 RX ADMIN — APIXABAN SCH MG: 5 TABLET, FILM COATED ORAL at 08:17

## 2019-12-14 RX ADMIN — ESTROGENS, CONJUGATED SCH MG: 0.62 TABLET, FILM COATED ORAL at 08:17

## 2019-12-14 RX ADMIN — PANTOPRAZOLE SODIUM SCH MG: 40 TABLET, DELAYED RELEASE ORAL at 08:17

## 2019-12-14 RX ADMIN — IPRATROPIUM BROMIDE AND ALBUTEROL SULFATE SCH ML: .5; 2.5 SOLUTION RESPIRATORY (INHALATION) at 18:20

## 2019-12-14 RX ADMIN — Medication SCH MG: at 08:17

## 2019-12-14 RX ADMIN — APIXABAN SCH MG: 5 TABLET, FILM COATED ORAL at 17:27

## 2019-12-14 RX ADMIN — OXYCODONE HYDROCHLORIDE AND ACETAMINOPHEN SCH MG: 500 TABLET ORAL at 08:18

## 2019-12-14 RX ADMIN — HYDROCODONE BITARTRATE AND ACETAMINOPHEN PRN EA: 5; 325 TABLET ORAL at 20:54

## 2019-12-14 RX ADMIN — IPRATROPIUM BROMIDE AND ALBUTEROL SULFATE SCH ML: .5; 2.5 SOLUTION RESPIRATORY (INHALATION) at 00:30

## 2019-12-14 RX ADMIN — INSULIN LISPRO SCH UNIT: 100 INJECTION, SOLUTION INTRAVENOUS; SUBCUTANEOUS at 20:47

## 2019-12-14 RX ADMIN — GUAIFENESIN AND DEXTROMETHORPHAN HYDROBROMIDE SCH EACH: 600; 30 TABLET, EXTENDED RELEASE ORAL at 08:17

## 2019-12-14 RX ADMIN — EZETIMIBE SCH MG: 10 TABLET ORAL at 08:19

## 2019-12-14 RX ADMIN — TIOTROPIUM BROMIDE SCH MCG: 18 CAPSULE ORAL; RESPIRATORY (INHALATION) at 08:05

## 2019-12-14 RX ADMIN — INSULIN LISPRO SCH UNIT: 100 INJECTION, SOLUTION INTRAVENOUS; SUBCUTANEOUS at 11:30

## 2019-12-14 RX ADMIN — OXYCODONE HYDROCHLORIDE AND ACETAMINOPHEN SCH MG: 500 TABLET ORAL at 17:27

## 2019-12-14 NOTE — NUR
Catrina Martinez NP aware of K 6.2 and high blood sugars throughout night. NP wrote new orders 
in computer.

## 2019-12-14 NOTE — NUR
Notified Sarika ARGUETA of  when rechecked. Stated to wait two more hours and call her with 
BS result.

## 2019-12-15 VITALS — SYSTOLIC BLOOD PRESSURE: 138 MMHG | DIASTOLIC BLOOD PRESSURE: 68 MMHG

## 2019-12-15 VITALS — SYSTOLIC BLOOD PRESSURE: 152 MMHG | DIASTOLIC BLOOD PRESSURE: 72 MMHG

## 2019-12-15 VITALS — DIASTOLIC BLOOD PRESSURE: 63 MMHG | SYSTOLIC BLOOD PRESSURE: 136 MMHG

## 2019-12-15 VITALS — DIASTOLIC BLOOD PRESSURE: 86 MMHG | SYSTOLIC BLOOD PRESSURE: 132 MMHG

## 2019-12-15 VITALS — DIASTOLIC BLOOD PRESSURE: 71 MMHG | SYSTOLIC BLOOD PRESSURE: 152 MMHG

## 2019-12-15 VITALS — SYSTOLIC BLOOD PRESSURE: 145 MMHG | DIASTOLIC BLOOD PRESSURE: 76 MMHG

## 2019-12-15 VITALS — DIASTOLIC BLOOD PRESSURE: 72 MMHG | SYSTOLIC BLOOD PRESSURE: 152 MMHG

## 2019-12-15 VITALS — DIASTOLIC BLOOD PRESSURE: 68 MMHG | SYSTOLIC BLOOD PRESSURE: 138 MMHG

## 2019-12-15 VITALS — DIASTOLIC BLOOD PRESSURE: 76 MMHG | SYSTOLIC BLOOD PRESSURE: 145 MMHG

## 2019-12-15 LAB
ANION GAP SERPL CALC-SCNC: 13.7 MMOL/L (ref 8–16)
BASOPHILS # BLD AUTO: 0 10*3/UL (ref 0–0.1)
BASOPHILS NFR BLD AUTO: 0.3 % (ref 0–1)
BUN SERPL-MCNC: 81 MG/DL (ref 7–26)
BUN/CREAT SERPL: 44 (ref 6–25)
CALCIUM SERPL-MCNC: 9 MG/DL (ref 8.4–10.2)
CHLORIDE SERPL-SCNC: 101 MMOL/L (ref 98–107)
CO2 SERPL-SCNC: 32 MMOL/L (ref 22–29)
DEPRECATED NEUTROPHILS # BLD AUTO: 8 10*3/UL (ref 2.1–6.9)
EGFRCR SERPLBLD CKD-EPI 2021: 28 ML/MIN (ref 60–?)
EOSINOPHIL # BLD AUTO: 0.1 10*3/UL (ref 0–0.4)
EOSINOPHIL NFR BLD AUTO: 0.5 % (ref 0–6)
ERYTHROCYTE [DISTWIDTH] IN CORD BLOOD: 14.9 % (ref 11.7–14.4)
FERRITIN SERPL-MCNC: 112.64 NG/ML (ref 4.63–204)
GLUCOSE SERPLBLD-MCNC: 62 MG/DL (ref 74–118)
HCT VFR BLD AUTO: 29.9 % (ref 34.2–44.1)
HGB BLD-MCNC: 9.1 G/DL (ref 12–16)
IRON SATN MFR SERPL: 9 % (ref 15–50)
IRON SERPL-MCNC: 36 UG/DL (ref 50–170)
LYMPHOCYTES # BLD: 2.6 10*3/UL (ref 1–3.2)
LYMPHOCYTES NFR BLD AUTO: 22.6 % (ref 18–39.1)
MCH RBC QN AUTO: 29.1 PG (ref 28–32)
MCHC RBC AUTO-ENTMCNC: 30.4 G/DL (ref 31–35)
MCV RBC AUTO: 95.5 FL (ref 81–99)
MONOCYTES # BLD AUTO: 0.8 10*3/UL (ref 0.2–0.8)
MONOCYTES NFR BLD AUTO: 6.6 % (ref 4.4–11.3)
NEUTS SEG NFR BLD AUTO: 69.3 % (ref 38.7–80)
PLATELET # BLD AUTO: 190 X10E3/UL (ref 140–360)
POTASSIUM SERPL-SCNC: 4.7 MMOL/L (ref 3.5–5.1)
RBC # BLD AUTO: 3.13 X10E6/UL (ref 3.6–5.1)
SODIUM SERPL-SCNC: 142 MMOL/L (ref 136–145)
TIBC SERPL-MCNC: 402 UG/DL (ref 261–478)
TRANSFERRIN SERPL-MCNC: 287 MG/DL (ref 180–382)

## 2019-12-15 RX ADMIN — INSULIN GLARGINE SCH UNITS: 100 INJECTION, SOLUTION SUBCUTANEOUS at 16:15

## 2019-12-15 RX ADMIN — HYDROCODONE BITARTRATE AND ACETAMINOPHEN PRN EA: 5; 325 TABLET ORAL at 04:46

## 2019-12-15 RX ADMIN — METHYLPREDNISOLONE SODIUM SUCCINATE SCH MG: 40 INJECTION, POWDER, LYOPHILIZED, FOR SOLUTION INTRAMUSCULAR; INTRAVENOUS at 07:57

## 2019-12-15 RX ADMIN — INSULIN LISPRO SCH UNIT: 100 INJECTION, SOLUTION INTRAVENOUS; SUBCUTANEOUS at 16:14

## 2019-12-15 RX ADMIN — IPRATROPIUM BROMIDE AND ALBUTEROL SULFATE SCH ML: .5; 2.5 SOLUTION RESPIRATORY (INHALATION) at 01:15

## 2019-12-15 RX ADMIN — GUAIFENESIN AND DEXTROMETHORPHAN HYDROBROMIDE SCH EACH: 600; 30 TABLET, EXTENDED RELEASE ORAL at 16:56

## 2019-12-15 RX ADMIN — GUAIFENESIN AND DEXTROMETHORPHAN HYDROBROMIDE SCH EACH: 600; 30 TABLET, EXTENDED RELEASE ORAL at 08:11

## 2019-12-15 RX ADMIN — ESTROGENS, CONJUGATED SCH MG: 0.62 TABLET, FILM COATED ORAL at 08:11

## 2019-12-15 RX ADMIN — IPRATROPIUM BROMIDE AND ALBUTEROL SULFATE SCH ML: .5; 2.5 SOLUTION RESPIRATORY (INHALATION) at 08:00

## 2019-12-15 RX ADMIN — TIOTROPIUM BROMIDE SCH MCG: 18 CAPSULE ORAL; RESPIRATORY (INHALATION) at 08:00

## 2019-12-15 RX ADMIN — IPRATROPIUM BROMIDE AND ALBUTEROL SULFATE SCH ML: .5; 2.5 SOLUTION RESPIRATORY (INHALATION) at 20:15

## 2019-12-15 RX ADMIN — INSULIN LISPRO SCH UNIT: 100 INJECTION, SOLUTION INTRAVENOUS; SUBCUTANEOUS at 07:30

## 2019-12-15 RX ADMIN — Medication SCH MG: at 07:58

## 2019-12-15 RX ADMIN — IPRATROPIUM BROMIDE AND ALBUTEROL SULFATE SCH ML: .5; 2.5 SOLUTION RESPIRATORY (INHALATION) at 12:00

## 2019-12-15 RX ADMIN — Medication SCH MG: at 16:56

## 2019-12-15 RX ADMIN — INSULIN GLARGINE SCH UNITS: 100 INJECTION, SOLUTION SUBCUTANEOUS at 09:00

## 2019-12-15 RX ADMIN — AMLODIPINE BESYLATE SCH MG: 10 TABLET ORAL at 08:11

## 2019-12-15 RX ADMIN — APIXABAN SCH MG: 5 TABLET, FILM COATED ORAL at 08:11

## 2019-12-15 RX ADMIN — TRAMADOL HYDROCHLORIDE PRN MG: 50 TABLET, FILM COATED ORAL at 08:17

## 2019-12-15 RX ADMIN — INSULIN LISPRO SCH UNIT: 100 INJECTION, SOLUTION INTRAVENOUS; SUBCUTANEOUS at 21:59

## 2019-12-15 RX ADMIN — OXYCODONE HYDROCHLORIDE AND ACETAMINOPHEN SCH MG: 500 TABLET ORAL at 08:11

## 2019-12-15 RX ADMIN — SODIUM CHLORIDE SCH MLS/HR: 900 INJECTION, SOLUTION INTRAVENOUS at 05:26

## 2019-12-15 RX ADMIN — HYDROCODONE BITARTRATE AND ACETAMINOPHEN PRN EA: 5; 325 TABLET ORAL at 14:17

## 2019-12-15 RX ADMIN — EZETIMIBE SCH MG: 10 TABLET ORAL at 08:11

## 2019-12-15 RX ADMIN — APIXABAN SCH MG: 5 TABLET, FILM COATED ORAL at 16:56

## 2019-12-15 RX ADMIN — INSULIN LISPRO SCH UNIT: 100 INJECTION, SOLUTION INTRAVENOUS; SUBCUTANEOUS at 11:30

## 2019-12-15 RX ADMIN — FLUTICASONE PROPIONATE AND SALMETEROL SCH EA: 50; 250 POWDER RESPIRATORY (INHALATION) at 08:00

## 2019-12-15 RX ADMIN — OXYCODONE HYDROCHLORIDE AND ACETAMINOPHEN SCH MG: 500 TABLET ORAL at 16:56

## 2019-12-15 RX ADMIN — CEFTRIAXONE SCH MLS/HR: 100 INJECTION, POWDER, FOR SOLUTION INTRAVENOUS at 04:35

## 2019-12-15 RX ADMIN — PANTOPRAZOLE SODIUM SCH MG: 40 TABLET, DELAYED RELEASE ORAL at 07:57

## 2019-12-15 RX ADMIN — Medication SCH MG: at 08:11

## 2019-12-15 RX ADMIN — BUMETANIDE SCH MG: 1 TABLET ORAL at 08:11

## 2019-12-15 NOTE — NUR
RCD PT FROM IMCU BY WHEEL CHAIR PT IS ALERT AND ORIENTED VITALS CHECKED PT RESTING ON BED  
GETTING O2 4 L BY NC  BED LOW AND LOCKED CALL LIGHT IN REACH

## 2019-12-15 NOTE — DIAGNOSTIC IMAGING REPORT
EXAMINATION:  CHEST 2 VIEWS    



INDICATION: Pulmonary edema, pleural effusion  



COMPARISON:  Chest x-ray 12/12/2019

     

FINDINGS:  



TUBES and LINES:  None.



LUNGS/PLEURA:  Lungs are well inflated.  Central and lower lung hazy airspace

opacities. Prominent pulmonary vasculature.      Persistent basilar opacities

obscured right hemidiaphragm. Blunted costophrenic sulci.



HEART AND MEDIASTINUM:  Cardiac size is mildly enlarged.    



BONES AND SOFT TISSUES:  No acute osseous lesion.  Soft tissues are

unremarkable.



UPPER ABDOMEN: No free air under the diaphragm.    



IMPRESSION: 



Mild cardiomegaly with persistent pulmonary edema and small bilateral pleural

effusions, right greater than left.



Signed by: Carl Rosa DO on 12/15/2019 7:00 AM

## 2019-12-15 NOTE — DIAGNOSTIC IMAGING REPORT
EXAMINATION:  CHEST SINGLE (PORTABLE)    



INDICATION: Congestive heart failure     



COMPARISON:  Chest x-ray 12/15/2019

     

FINDINGS:   



Exam limited by portable technique and suboptimal x-ray exposure/penetration. 



TUBES and LINES:  None.



LUNGS/PLEURA:  Low lung volumes. Dense hazy opacities in the central and lower

lungs. Bilateral hemidiaphragms obscured.



HEART AND MEDIASTINUM:  Cardiac size is mildly enlarged. There are

atherosclerotic calcifications within the aorta.



BONES AND SOFT TISSUES: Unchanged.



UPPER ABDOMEN: Unchanged. 



IMPRESSION: 



Persistent findings of mild cardiomegaly, pulmonary edema, and small bilateral

pleural effusions.



Signed by: Carl Rosa DO on 12/15/2019 9:03 PM

## 2019-12-16 VITALS — DIASTOLIC BLOOD PRESSURE: 62 MMHG | SYSTOLIC BLOOD PRESSURE: 137 MMHG

## 2019-12-16 VITALS — DIASTOLIC BLOOD PRESSURE: 64 MMHG | SYSTOLIC BLOOD PRESSURE: 145 MMHG

## 2019-12-16 VITALS — SYSTOLIC BLOOD PRESSURE: 137 MMHG | DIASTOLIC BLOOD PRESSURE: 62 MMHG

## 2019-12-16 VITALS — DIASTOLIC BLOOD PRESSURE: 68 MMHG | SYSTOLIC BLOOD PRESSURE: 150 MMHG

## 2019-12-16 VITALS — SYSTOLIC BLOOD PRESSURE: 145 MMHG | DIASTOLIC BLOOD PRESSURE: 64 MMHG

## 2019-12-16 VITALS — DIASTOLIC BLOOD PRESSURE: 77 MMHG | SYSTOLIC BLOOD PRESSURE: 176 MMHG

## 2019-12-16 VITALS — SYSTOLIC BLOOD PRESSURE: 161 MMHG | DIASTOLIC BLOOD PRESSURE: 72 MMHG

## 2019-12-16 VITALS — DIASTOLIC BLOOD PRESSURE: 72 MMHG | SYSTOLIC BLOOD PRESSURE: 145 MMHG

## 2019-12-16 LAB
ANION GAP SERPL CALC-SCNC: 12.7 MMOL/L (ref 8–16)
BASOPHILS # BLD AUTO: 0 10*3/UL (ref 0–0.1)
BASOPHILS NFR BLD AUTO: 0.1 % (ref 0–1)
BUN SERPL-MCNC: 69 MG/DL (ref 7–26)
BUN/CREAT SERPL: 47 (ref 6–25)
CALCIUM SERPL-MCNC: 9 MG/DL (ref 8.4–10.2)
CHLORIDE SERPL-SCNC: 102 MMOL/L (ref 98–107)
CO2 SERPL-SCNC: 32 MMOL/L (ref 22–29)
DEPRECATED NEUTROPHILS # BLD AUTO: 6.6 10*3/UL (ref 2.1–6.9)
EGFRCR SERPLBLD CKD-EPI 2021: 36 ML/MIN (ref 60–?)
EOSINOPHIL # BLD AUTO: 0 10*3/UL (ref 0–0.4)
EOSINOPHIL NFR BLD AUTO: 0.2 % (ref 0–6)
ERYTHROCYTE [DISTWIDTH] IN CORD BLOOD: 15.2 % (ref 11.7–14.4)
GLUCOSE SERPLBLD-MCNC: 131 MG/DL (ref 74–118)
HCT VFR BLD AUTO: 29.2 % (ref 34.2–44.1)
HGB BLD-MCNC: 9.2 G/DL (ref 12–16)
LYMPHOCYTES # BLD: 1.7 10*3/UL (ref 1–3.2)
LYMPHOCYTES NFR BLD AUTO: 18.4 % (ref 18–39.1)
MCH RBC QN AUTO: 29.8 PG (ref 28–32)
MCHC RBC AUTO-ENTMCNC: 31.5 G/DL (ref 31–35)
MCV RBC AUTO: 94.5 FL (ref 81–99)
MONOCYTES # BLD AUTO: 0.7 10*3/UL (ref 0.2–0.8)
MONOCYTES NFR BLD AUTO: 7.4 % (ref 4.4–11.3)
NEUTS SEG NFR BLD AUTO: 73.3 % (ref 38.7–80)
PLATELET # BLD AUTO: 184 X10E3/UL (ref 140–360)
POTASSIUM SERPL-SCNC: 4.7 MMOL/L (ref 3.5–5.1)
RBC # BLD AUTO: 3.09 X10E6/UL (ref 3.6–5.1)
SODIUM SERPL-SCNC: 142 MMOL/L (ref 136–145)

## 2019-12-16 RX ADMIN — GUAIFENESIN AND DEXTROMETHORPHAN HYDROBROMIDE SCH EACH: 600; 30 TABLET, EXTENDED RELEASE ORAL at 09:03

## 2019-12-16 RX ADMIN — AMLODIPINE BESYLATE SCH MG: 10 TABLET ORAL at 09:03

## 2019-12-16 RX ADMIN — IPRATROPIUM BROMIDE AND ALBUTEROL SULFATE SCH ML: .5; 2.5 SOLUTION RESPIRATORY (INHALATION) at 19:45

## 2019-12-16 RX ADMIN — METHYLPREDNISOLONE SODIUM SUCCINATE SCH MG: 40 INJECTION, POWDER, LYOPHILIZED, FOR SOLUTION INTRAMUSCULAR; INTRAVENOUS at 09:02

## 2019-12-16 RX ADMIN — INSULIN LISPRO SCH UNIT: 100 INJECTION, SOLUTION INTRAVENOUS; SUBCUTANEOUS at 21:36

## 2019-12-16 RX ADMIN — Medication SCH MG: at 09:03

## 2019-12-16 RX ADMIN — APIXABAN SCH MG: 5 TABLET, FILM COATED ORAL at 09:03

## 2019-12-16 RX ADMIN — HYDROCODONE BITARTRATE AND ACETAMINOPHEN PRN EA: 5; 325 TABLET ORAL at 06:15

## 2019-12-16 RX ADMIN — APIXABAN SCH MG: 5 TABLET, FILM COATED ORAL at 16:49

## 2019-12-16 RX ADMIN — Medication SCH MG: at 16:49

## 2019-12-16 RX ADMIN — TIOTROPIUM BROMIDE SCH MCG: 18 CAPSULE ORAL; RESPIRATORY (INHALATION) at 07:39

## 2019-12-16 RX ADMIN — GUAIFENESIN AND DEXTROMETHORPHAN HYDROBROMIDE SCH EACH: 600; 30 TABLET, EXTENDED RELEASE ORAL at 16:49

## 2019-12-16 RX ADMIN — INSULIN LISPRO SCH UNIT: 100 INJECTION, SOLUTION INTRAVENOUS; SUBCUTANEOUS at 12:13

## 2019-12-16 RX ADMIN — INSULIN LISPRO SCH UNIT: 100 INJECTION, SOLUTION INTRAVENOUS; SUBCUTANEOUS at 07:30

## 2019-12-16 RX ADMIN — CEFTRIAXONE SCH MLS/HR: 100 INJECTION, POWDER, FOR SOLUTION INTRAVENOUS at 05:20

## 2019-12-16 RX ADMIN — HYDROCODONE BITARTRATE AND ACETAMINOPHEN PRN EA: 5; 325 TABLET ORAL at 09:48

## 2019-12-16 RX ADMIN — IPRATROPIUM BROMIDE AND ALBUTEROL SULFATE SCH ML: .5; 2.5 SOLUTION RESPIRATORY (INHALATION) at 07:00

## 2019-12-16 RX ADMIN — FLUTICASONE PROPIONATE AND SALMETEROL SCH EA: 50; 250 POWDER RESPIRATORY (INHALATION) at 07:39

## 2019-12-16 RX ADMIN — IPRATROPIUM BROMIDE AND ALBUTEROL SULFATE SCH ML: .5; 2.5 SOLUTION RESPIRATORY (INHALATION) at 00:30

## 2019-12-16 RX ADMIN — BUMETANIDE SCH MG: 1 TABLET ORAL at 09:03

## 2019-12-16 RX ADMIN — PANTOPRAZOLE SODIUM SCH MG: 40 TABLET, DELAYED RELEASE ORAL at 09:02

## 2019-12-16 RX ADMIN — OXYCODONE HYDROCHLORIDE AND ACETAMINOPHEN SCH MG: 500 TABLET ORAL at 16:49

## 2019-12-16 RX ADMIN — IPRATROPIUM BROMIDE AND ALBUTEROL SULFATE SCH ML: .5; 2.5 SOLUTION RESPIRATORY (INHALATION) at 13:40

## 2019-12-16 RX ADMIN — EZETIMIBE SCH MG: 10 TABLET ORAL at 09:03

## 2019-12-16 RX ADMIN — SODIUM CHLORIDE SCH MLS/HR: 900 INJECTION, SOLUTION INTRAVENOUS at 06:40

## 2019-12-16 RX ADMIN — INSULIN GLARGINE SCH UNITS: 100 INJECTION, SOLUTION SUBCUTANEOUS at 17:20

## 2019-12-16 RX ADMIN — INSULIN LISPRO SCH UNIT: 100 INJECTION, SOLUTION INTRAVENOUS; SUBCUTANEOUS at 16:50

## 2019-12-16 RX ADMIN — OXYCODONE HYDROCHLORIDE AND ACETAMINOPHEN SCH MG: 500 TABLET ORAL at 09:03

## 2019-12-16 RX ADMIN — ESTROGENS, CONJUGATED SCH MG: 0.62 TABLET, FILM COATED ORAL at 16:49

## 2019-12-16 RX ADMIN — INSULIN GLARGINE SCH UNITS: 100 INJECTION, SOLUTION SUBCUTANEOUS at 09:00

## 2019-12-16 NOTE — DIAGNOSTIC IMAGING REPORT
EXAMINATION:  CHEST 2 VIEWS    



INDICATION: Pneumonia



COMPARISON: Chest are graft 12/15/2019

     

FINDINGS:



LINES/TUBES:None



LUNGS:The lungs are moderately inflated. There is perihilar fullness and

indistinctness of the pulmonary vasculature. Unchanged bibasilar patchy

opacities.



PLEURA:Small bilateral pleural effusions. No pneumothorax.



MEDIASTINUM:Cardiomediastinal silhouette is stably enlarged.



BONES/SOFT TISSUES:No acute osseous injury.



ABDOMEN:No free air under the diaphragm.





IMPRESSION: 

Unchanged pulmonary edema and cardiomegaly.



Stable small bilateral pleural effusions.



Bibasilar patchy opacities, more likely subsegmental atelectasis than

superimposed aspiration or pneumonia.



Signed by: Harman Ochoa MD on 12/16/2019 9:04 AM

## 2019-12-16 NOTE — NUR
pt received.  pt assessed.  no ss of distress noted.  no co pain at time.  02 nc noted.  
reinforced fluid restriction.  pt verbalized understanding.  will cont to follow poc.  call 
bell within reach.

## 2019-12-17 VITALS — SYSTOLIC BLOOD PRESSURE: 136 MMHG | DIASTOLIC BLOOD PRESSURE: 60 MMHG

## 2019-12-17 VITALS — SYSTOLIC BLOOD PRESSURE: 154 MMHG | DIASTOLIC BLOOD PRESSURE: 69 MMHG

## 2019-12-17 VITALS — SYSTOLIC BLOOD PRESSURE: 132 MMHG | DIASTOLIC BLOOD PRESSURE: 75 MMHG

## 2019-12-17 VITALS — DIASTOLIC BLOOD PRESSURE: 81 MMHG | SYSTOLIC BLOOD PRESSURE: 145 MMHG

## 2019-12-17 VITALS — DIASTOLIC BLOOD PRESSURE: 76 MMHG | SYSTOLIC BLOOD PRESSURE: 158 MMHG

## 2019-12-17 VITALS — SYSTOLIC BLOOD PRESSURE: 157 MMHG | DIASTOLIC BLOOD PRESSURE: 71 MMHG

## 2019-12-17 LAB
ANION GAP SERPL CALC-SCNC: 14.6 MMOL/L (ref 8–16)
BASOPHILS # BLD AUTO: 0 10*3/UL (ref 0–0.1)
BASOPHILS NFR BLD AUTO: 0.2 % (ref 0–1)
BUN SERPL-MCNC: 52 MG/DL (ref 7–26)
BUN/CREAT SERPL: 35 (ref 6–25)
CALCIUM SERPL-MCNC: 9.5 MG/DL (ref 8.4–10.2)
CHLORIDE SERPL-SCNC: 103 MMOL/L (ref 98–107)
CO2 SERPL-SCNC: 33 MMOL/L (ref 22–29)
DEPRECATED NEUTROPHILS # BLD AUTO: 6.1 10*3/UL (ref 2.1–6.9)
EGFRCR SERPLBLD CKD-EPI 2021: 36 ML/MIN (ref 60–?)
EOSINOPHIL # BLD AUTO: 0.1 10*3/UL (ref 0–0.4)
EOSINOPHIL NFR BLD AUTO: 0.9 % (ref 0–6)
ERYTHROCYTE [DISTWIDTH] IN CORD BLOOD: 15.1 % (ref 11.7–14.4)
GLUCOSE SERPLBLD-MCNC: 71 MG/DL (ref 74–118)
HCT VFR BLD AUTO: 33.1 % (ref 34.2–44.1)
HGB BLD-MCNC: 10.2 G/DL (ref 12–16)
LYMPHOCYTES # BLD: 2.3 10*3/UL (ref 1–3.2)
LYMPHOCYTES NFR BLD AUTO: 25.2 % (ref 18–39.1)
MCH RBC QN AUTO: 29.1 PG (ref 28–32)
MCHC RBC AUTO-ENTMCNC: 30.8 G/DL (ref 31–35)
MCV RBC AUTO: 94.6 FL (ref 81–99)
MONOCYTES # BLD AUTO: 0.7 10*3/UL (ref 0.2–0.8)
MONOCYTES NFR BLD AUTO: 7.2 % (ref 4.4–11.3)
NEUTS SEG NFR BLD AUTO: 65.6 % (ref 38.7–80)
PLATELET # BLD AUTO: 209 X10E3/UL (ref 140–360)
POTASSIUM SERPL-SCNC: 4.6 MMOL/L (ref 3.5–5.1)
RBC # BLD AUTO: 3.5 X10E6/UL (ref 3.6–5.1)
SODIUM SERPL-SCNC: 146 MMOL/L (ref 136–145)

## 2019-12-17 RX ADMIN — EZETIMIBE SCH MG: 10 TABLET ORAL at 09:40

## 2019-12-17 RX ADMIN — CEFTRIAXONE SCH MLS/HR: 100 INJECTION, POWDER, FOR SOLUTION INTRAVENOUS at 05:21

## 2019-12-17 RX ADMIN — HYDROCODONE BITARTRATE AND ACETAMINOPHEN PRN EA: 5; 325 TABLET ORAL at 14:55

## 2019-12-17 RX ADMIN — IPRATROPIUM BROMIDE AND ALBUTEROL SULFATE SCH ML: .5; 2.5 SOLUTION RESPIRATORY (INHALATION) at 07:10

## 2019-12-17 RX ADMIN — Medication SCH MG: at 17:15

## 2019-12-17 RX ADMIN — INSULIN LISPRO SCH UNIT: 100 INJECTION, SOLUTION INTRAVENOUS; SUBCUTANEOUS at 14:53

## 2019-12-17 RX ADMIN — TIOTROPIUM BROMIDE SCH MCG: 18 CAPSULE ORAL; RESPIRATORY (INHALATION) at 07:21

## 2019-12-17 RX ADMIN — HYDROCODONE BITARTRATE AND ACETAMINOPHEN PRN EA: 5; 325 TABLET ORAL at 06:02

## 2019-12-17 RX ADMIN — HYDROCODONE BITARTRATE AND ACETAMINOPHEN PRN EA: 5; 325 TABLET ORAL at 20:34

## 2019-12-17 RX ADMIN — Medication SCH MG: at 09:39

## 2019-12-17 RX ADMIN — OXYCODONE HYDROCHLORIDE AND ACETAMINOPHEN SCH MG: 500 TABLET ORAL at 09:40

## 2019-12-17 RX ADMIN — AMLODIPINE BESYLATE SCH MG: 10 TABLET ORAL at 09:40

## 2019-12-17 RX ADMIN — APIXABAN SCH MG: 5 TABLET, FILM COATED ORAL at 09:39

## 2019-12-17 RX ADMIN — INSULIN LISPRO SCH UNIT: 100 INJECTION, SOLUTION INTRAVENOUS; SUBCUTANEOUS at 20:35

## 2019-12-17 RX ADMIN — BUMETANIDE SCH MG: 1 TABLET ORAL at 09:39

## 2019-12-17 RX ADMIN — OXYCODONE HYDROCHLORIDE AND ACETAMINOPHEN SCH MG: 500 TABLET ORAL at 17:15

## 2019-12-17 RX ADMIN — PANTOPRAZOLE SODIUM SCH MG: 40 TABLET, DELAYED RELEASE ORAL at 09:39

## 2019-12-17 RX ADMIN — SODIUM CHLORIDE SCH MLS/HR: 900 INJECTION, SOLUTION INTRAVENOUS at 05:56

## 2019-12-17 RX ADMIN — GUAIFENESIN AND DEXTROMETHORPHAN HYDROBROMIDE SCH EACH: 600; 30 TABLET, EXTENDED RELEASE ORAL at 17:15

## 2019-12-17 RX ADMIN — METHYLPREDNISOLONE SODIUM SUCCINATE SCH MG: 40 INJECTION, POWDER, LYOPHILIZED, FOR SOLUTION INTRAMUSCULAR; INTRAVENOUS at 09:39

## 2019-12-17 RX ADMIN — ESTROGENS, CONJUGATED SCH MG: 0.62 TABLET, FILM COATED ORAL at 09:40

## 2019-12-17 RX ADMIN — FLUTICASONE PROPIONATE AND SALMETEROL SCH EA: 50; 250 POWDER RESPIRATORY (INHALATION) at 07:21

## 2019-12-17 RX ADMIN — INSULIN GLARGINE SCH UNITS: 100 INJECTION, SOLUTION SUBCUTANEOUS at 17:19

## 2019-12-17 RX ADMIN — INSULIN GLARGINE SCH UNITS: 100 INJECTION, SOLUTION SUBCUTANEOUS at 09:00

## 2019-12-17 RX ADMIN — IPRATROPIUM BROMIDE AND ALBUTEROL SULFATE SCH ML: .5; 2.5 SOLUTION RESPIRATORY (INHALATION) at 20:00

## 2019-12-17 RX ADMIN — INSULIN LISPRO SCH UNIT: 100 INJECTION, SOLUTION INTRAVENOUS; SUBCUTANEOUS at 17:19

## 2019-12-17 RX ADMIN — IPRATROPIUM BROMIDE AND ALBUTEROL SULFATE SCH ML: .5; 2.5 SOLUTION RESPIRATORY (INHALATION) at 13:00

## 2019-12-17 RX ADMIN — GUAIFENESIN AND DEXTROMETHORPHAN HYDROBROMIDE SCH EACH: 600; 30 TABLET, EXTENDED RELEASE ORAL at 09:39

## 2019-12-17 RX ADMIN — APIXABAN SCH MG: 5 TABLET, FILM COATED ORAL at 17:15

## 2019-12-17 RX ADMIN — IPRATROPIUM BROMIDE AND ALBUTEROL SULFATE SCH ML: .5; 2.5 SOLUTION RESPIRATORY (INHALATION) at 00:30

## 2019-12-17 RX ADMIN — INSULIN LISPRO SCH UNIT: 100 INJECTION, SOLUTION INTRAVENOUS; SUBCUTANEOUS at 07:30

## 2019-12-17 NOTE — NUR
pt alert resp even and unlabored pt able to make needs known, no c/o pain nor distress 
noted, will cont to monitor, call light in reach.

## 2019-12-17 NOTE — NUR
Spoke to ELLIE Amos regarding DC plan. He gave order for SNF eval. 



Spoke to pt at bedside about DC plan. Pt states she lives alone and does not have help at 
home. Uses home O2 at 4lpm and bipap. Pt wants to go to SNF for rehab. Discussed choice with 
pt. She states she wants to go to a facility closer to her home. States was previously at 
[facility] and would like to go there if possible. Another option was [place]. CM called 
both facilities and they both are accepting her insurance, however only able to go up to 
5lpm on oxygen. Pt currently on 6lpm. 

Cm informed pt she would need to go to a facility that is able to accommodate her oxygen 
needs for a safe discharge. Pt agreeable to [facility]. CM called and spoke 
to admissions coordinator Anabel at [facility]. She said they are able to go up to 15lpm 
on oxygen. 

Choice letter signed and placed in chart. Copy to pt. 

IMM letter discussed with pt. She verbalized understanding. Signed copy placed in chart. 
Copy to pt. 



SW was notified of choice for SNF and will send clinical.

## 2019-12-18 VITALS — DIASTOLIC BLOOD PRESSURE: 66 MMHG | SYSTOLIC BLOOD PRESSURE: 136 MMHG

## 2019-12-18 VITALS — DIASTOLIC BLOOD PRESSURE: 79 MMHG | SYSTOLIC BLOOD PRESSURE: 142 MMHG

## 2019-12-18 VITALS — DIASTOLIC BLOOD PRESSURE: 73 MMHG | SYSTOLIC BLOOD PRESSURE: 162 MMHG

## 2019-12-18 VITALS — SYSTOLIC BLOOD PRESSURE: 172 MMHG | DIASTOLIC BLOOD PRESSURE: 72 MMHG

## 2019-12-18 VITALS — SYSTOLIC BLOOD PRESSURE: 162 MMHG | DIASTOLIC BLOOD PRESSURE: 73 MMHG

## 2019-12-18 VITALS — SYSTOLIC BLOOD PRESSURE: 137 MMHG | DIASTOLIC BLOOD PRESSURE: 65 MMHG

## 2019-12-18 RX ADMIN — GUAIFENESIN AND DEXTROMETHORPHAN HYDROBROMIDE SCH EACH: 600; 30 TABLET, EXTENDED RELEASE ORAL at 17:17

## 2019-12-18 RX ADMIN — METHYLPREDNISOLONE SODIUM SUCCINATE SCH MG: 40 INJECTION, POWDER, LYOPHILIZED, FOR SOLUTION INTRAMUSCULAR; INTRAVENOUS at 08:21

## 2019-12-18 RX ADMIN — IPRATROPIUM BROMIDE AND ALBUTEROL SULFATE SCH ML: .5; 2.5 SOLUTION RESPIRATORY (INHALATION) at 13:15

## 2019-12-18 RX ADMIN — ESTROGENS, CONJUGATED SCH MG: 0.62 TABLET, FILM COATED ORAL at 09:33

## 2019-12-18 RX ADMIN — INSULIN LISPRO SCH UNIT: 100 INJECTION, SOLUTION INTRAVENOUS; SUBCUTANEOUS at 12:14

## 2019-12-18 RX ADMIN — INSULIN GLARGINE SCH UNITS: 100 INJECTION, SOLUTION SUBCUTANEOUS at 09:00

## 2019-12-18 RX ADMIN — Medication SCH MG: at 09:32

## 2019-12-18 RX ADMIN — HYDROCODONE BITARTRATE AND ACETAMINOPHEN PRN EA: 5; 325 TABLET ORAL at 09:30

## 2019-12-18 RX ADMIN — EZETIMIBE SCH MG: 10 TABLET ORAL at 09:33

## 2019-12-18 RX ADMIN — INSULIN LISPRO SCH UNIT: 100 INJECTION, SOLUTION INTRAVENOUS; SUBCUTANEOUS at 07:30

## 2019-12-18 RX ADMIN — AMLODIPINE BESYLATE SCH MG: 10 TABLET ORAL at 09:33

## 2019-12-18 RX ADMIN — Medication SCH MG: at 08:21

## 2019-12-18 RX ADMIN — OXYCODONE HYDROCHLORIDE AND ACETAMINOPHEN SCH MG: 500 TABLET ORAL at 09:33

## 2019-12-18 RX ADMIN — BUMETANIDE SCH MG: 1 TABLET ORAL at 09:30

## 2019-12-18 RX ADMIN — INSULIN GLARGINE SCH UNITS: 100 INJECTION, SOLUTION SUBCUTANEOUS at 17:37

## 2019-12-18 RX ADMIN — OXYCODONE HYDROCHLORIDE AND ACETAMINOPHEN SCH MG: 500 TABLET ORAL at 17:18

## 2019-12-18 RX ADMIN — Medication SCH MG: at 17:17

## 2019-12-18 RX ADMIN — IPRATROPIUM BROMIDE AND ALBUTEROL SULFATE SCH ML: .5; 2.5 SOLUTION RESPIRATORY (INHALATION) at 06:55

## 2019-12-18 RX ADMIN — CEFTRIAXONE SCH MLS/HR: 100 INJECTION, POWDER, FOR SOLUTION INTRAVENOUS at 05:52

## 2019-12-18 RX ADMIN — APIXABAN SCH MG: 5 TABLET, FILM COATED ORAL at 17:17

## 2019-12-18 RX ADMIN — APIXABAN SCH MG: 5 TABLET, FILM COATED ORAL at 09:31

## 2019-12-18 RX ADMIN — PANTOPRAZOLE SODIUM SCH MG: 40 TABLET, DELAYED RELEASE ORAL at 08:21

## 2019-12-18 RX ADMIN — INSULIN LISPRO SCH UNIT: 100 INJECTION, SOLUTION INTRAVENOUS; SUBCUTANEOUS at 17:35

## 2019-12-18 RX ADMIN — GUAIFENESIN AND DEXTROMETHORPHAN HYDROBROMIDE SCH EACH: 600; 30 TABLET, EXTENDED RELEASE ORAL at 09:32

## 2019-12-18 RX ADMIN — IPRATROPIUM BROMIDE AND ALBUTEROL SULFATE SCH ML: .5; 2.5 SOLUTION RESPIRATORY (INHALATION) at 00:15

## 2019-12-18 RX ADMIN — FLUTICASONE PROPIONATE AND SALMETEROL SCH EA: 50; 250 POWDER RESPIRATORY (INHALATION) at 07:03

## 2019-12-18 RX ADMIN — TIOTROPIUM BROMIDE SCH MCG: 18 CAPSULE ORAL; RESPIRATORY (INHALATION) at 07:03

## 2019-12-18 NOTE — NUR
Called family Catrina Oliveira to inform her that patient is going to [facility].  [phone].  
Had to leave message.No names or personal info was left, just that a member of the family at 
the hops was leaving, and to please call me back

## 2019-12-18 NOTE — NUR
Nutrition Screen Note



RD Recommendation for Physician: 

-Recommend low sodium/diabetic diet



Plan of Care: RD following, monitoring for tolerance and adequacy



Nutrition reason for involvement: f/u



Primary Diagnose(s): CHF, COPD, hypoxia, and respiratory distress



PMH: obesity, hypoventilation syndrome, chronic obstructive sleep apnea, CHF, and diabetes



Ht: 63 in 

Wt:257 lb     

BMI: 45.5 kg/m2

IBW:115 lb     



RD Assessment:

12/18: Follow up: Pt was seen resting in bed, on NC. Pt reported a good appetite, denied 
C/D/chewing or swallowing issues. She stated she does deal with some nausea d/t her meds. 
Possible d/c today to SNF. Pt has been consuming  approximately % of her meals per 
meal assessment.  Weights within chart appear inaccurate, recommended to obtain correct 
weight. Pt had no other questions or concerns. Chart reviewed. Will continue to monitor. 



(12/13/19) Chart reviewed. Labs and meds reviewed. Pt is a 59 year old female admitted with 
CHF, COPD, hypoxia, and respiratory distress. Pt stated she has been eating all of her 
meals. Pt also mentioned he weight typically fluctuates due to fluid but her usual dry 
weight is 235 lbs. Pt also reports some nausea. No chewing/swallowing issues. Will continue 
to monitor.



Current Diet: ADA diet



Malnutrition Evaluation (12/13/19)

The patient does not meet criteria for a specified degree of malnutrition at this time. Will 
re-evaluate at follow-up as appropriate. 





Diet Education Needs Assessment: Pt was interested in diet education 



Learner(s): pt

Barriers: No barriers identified 

Cultural/Language Modifications: No cultural/language modifications noted.

Readiness: willing

Method: explanation/ discussion, handout

Topics: Carbohydrate counting handouts, Reading the nutrition label, low sodium diet, weight 
management

Understanding/Compliance: pt verbalized understanding 



     

Nutrition Care Level: low- pt is meeting her energy  and protein needs via meal assessment 





Signed: Yaritza Cantu RD, LD

## 2019-12-18 NOTE — NUR
SKILLED NURSING FACILITY DISCHARGE INFORMATION 

PATIENT HAS BEEN ACCEPTED TO: 

NAME: [facility]

ADDRESS:[address] [facility] 

ACCEPTING : KEVIN HANSON

ACCEPTING MD: OLEGARIO

ROOM: [id number]

NURSE CALL REPORT TO: [phone]

IMM SIGNED AND OBTAINED (if applicable): Y

THE FOLLOWING DOCUMENTS MUST ACCOMPANY PATIENT FOR TRANSFER:

COPIED CHART: PACKET

## 2019-12-19 NOTE — DISCHARGE SUMMARY
HISTORY OF PRESENT ILLNESS:  Ms. Oliveira is a 59-year-old female, who was admitted with

complaints of shortness of breath and dyspnea on exertion and chest tightness that began

over two weeks prior to admission.  She had received a prescription for Cipro and

doxycycline that ended about 2-3 days prior to admission and her symptoms came back the

night before she arrived.  She denied fever or cough.  On admission, her son had the flu

and then had pneumonia.  The patient uses 4 liters of oxygen via nasal cannula at home. 

 

PAST MEDICAL HISTORY:  Include oxygen dependence, COPD, type 2 diabetes mellitus,

chronic kidney disease stage 3, paroxysmal atrial fibrillation, obstructive sleep apnea,

hypertension, and chronic diastolic congestive heart failure. 

 

PAST SURGICAL HISTORY:  Include ovarian cyst removal, left arm; left chest GST removal;

tonsillectomy, 3 D and Cs and hysterectomy. 

 

PAST FAMILY HISTORY:  Mother had diabetes and cancer.  The patient's mother,

grandfather, grandmother, and sister, all had cancer.  Grandmother had cerebrovascular

accident. 

 

SOCIAL HISTORY:  Noncontributory.

 

ALLERGIES:  INCLUDE RIVAROXABAN OR AKA XARELTO, NAPROXEN, SULFA, AND PENICILLIN.

 

CONSULTS:  Includes Dr. Dawson Meraz with pulmonology.

 

ADMITTING DIAGNOSES:  Include:

1. Acute on chronic diastolic congestive heart failure.

2. Acute on chronic respiratory failure with hypoxia.

3. Chronic kidney disease, stage 3.

4. Hypertension with chronic kidney disease, stage 3.

5. Type 2 diabetes mellitus with chronic kidney disease, stage 3.

6. Paroxysmal atrial fibrillation.

7. Obstructive sleep apnea.

8. Morbid obesity with BMI 45.5.

9. Acute exacerbation of chronic obstructive pulmonary disease.

 

DISCHARGE DIAGNOSES:  Include:

1. Acute on chronic diastolic congestive heart failure.

2. Acute on chronic respiratory failure with hypoxia, on oxygen dependence.

3. Hypertension with chronic kidney disease stage 3, coronary artery disease, and

congestive heart failure. 

4. Type 2 diabetes mellitus with chronic kidney disease, stage 3.

5. Chronic kidney disease, stage 3.

6. Right leg swelling.

7. Subsegmental atelectasis, probable pulmonary hypertension.

8. Right/left thyroid nodules.

9. Obstructive sleep apnea.

10. Paroxysmal atrial fibrillation.

 

HOSPITAL COURSE:  During her stay, the patient received Lasix, spent some time on BiPAP,

mention of the patient does use home BiPAP and has oxygen concentrator, which were both

currently in the room.  Fluid restriction was utilized.  She was on azithromycin,

ceftriaxone, DuoNeb, Solu-Medrol IV, tiotropium, salmeterol, fluconazole, Mucinex DM,

and Pulmonology continued to follow.  Solu-Medrol was gradually weaned down and the

patient will be discharged on prednisone 30 mg a day tapering dose.  Left upper

extremity venous Doppler ultrasound was negative and yesterday, the patient's right leg

appeared swollen approximately twice the size of the left leg and venous Doppler

ultrasound of the right lower extremity was also negative.  Hospitalization also

complicated by hyperkalemia with potassium 5.6 on December 15th.  She still has dyspnea

on exertion, has poor reserve with her oxygen, has chest pain with minimal exertion,

poor stamina.  She will receive physical therapy at the [facility].  They have the capability of providing up to 15 liters per minute of

oxygen as opposed to other skilled nursing facilities in the area that are only able to

provide 5 liters per minute maximum.  Therefore, if the patient has increasing shortness

of breath, more oxygen can be provided.  Chest x-ray done on December 16th showed

bibasilar patchy opacities, more highly subsegmental atelectasis than superimposed

aspiration or pneumonia.  CT of the chest on the same day have findings consistent with

pulmonary hypertension, arterial calcifications including coronary arteries, right and

lower thyroid nodules measuring up to 2.5 cm on the left and 1.6 cm on the right.  On a

nonurgent basis, thyroid ultrasound has been recommended.  We will try to go ahead and

get that before the patient leaves.  Today; sodium 146, potassium 4.6, chloride 103, CO2

of 33, BUN 52, creatinine 1.47, and calcium 9.5.  WBC 9.29, hemoglobin 10.2, hematocrit

33.1, and platelets 209.  Vital signs; temperature 96.5, heart rate 74, blood pressure

172/72, respirations 20, and oxygen saturation 91%.  The patient will 

continue on ADA diet.  Activity level as tolerated.  Follow up with Adelaida, nurse

practitioner and with Dr. Valladares at the [facility]. 

 

 

Dictated by Dandre Álvarez, NP

 

______________________________

Joe Valladares MD

 

HWMICHAELLE/CESILIA SESAY:  12/18/2019 12:29:07

T:  12/19/2019 10:44:43

Job #:  [id number]

## 2020-02-24 NOTE — DIAGNOSTIC IMAGING REPORT
EXAM: CT Chest WITHOUT intravenous contrast 12/16/2019 10:50 AM

INDICATION: Shortness of breath

COMPARISON: Chest radiograph of earlier the same day

TECHNIQUE:

Chest was scanned utilizing a multidetector helical scanner from the lung apex

through the level of the adrenal glands without administration of IV contrast.

Coronal and sagittal reformations were obtained. Routine protocol was

performed.



IV CONTRAST: None

RADIATION DOSE: Total DLP: 539.0 mGy*cm. Dose modulation, iterative

reconstruction, and/or weight based adjustment of the mA/kV was utilized to

reduce the radiation dose to as low as reasonably achievable. 

COMPLICATIONS: None



FINDINGS:



LINES/ TUBES: None.



LUNGS AND AIRWAYS:  The central airways are patent. No focal consolidation.

There is interstitial and mild airspace edema with smooth interlobular septal

thickening and fluid within the fissures. Scattered areas of a lateral

subsegmental atelectasis, most notably at the dependent lower lobes.  



PLEURA: Small right pleural effusion. No pneumothorax.



HEART AND MEDIASTINUM: 2.5 cm left lower thyroid hypodense nodule and 1.6 cm

right lower thyroid hypodense nodule.  No supraclavicular, mediastinal, or

hilar lymphadenopathy. Marked multichamber cardiomegaly. No pericardial

effusion. The main pulmonary artery is markedly dilated, measuring up to 4.1

cm. Diffuse atherosclerotic calcifications involve the coronary arteries,

thoracic aorta, and proximal great vessels.



UPPER ABDOMEN: Limited noncontrast images of the upper abdomen are degraded by

streak artifact and motion and demonstrate no definite abnormalities of the

partially visualized liver and spleen.



BONES: No acute osseous injury. No suspicious lytic or blastic lesions.

Degenerative changes of the visualized spine.



SOFT TISSUES: Unremarkable.



IMPRESSION: 

Pulmonary edema and marked cardiomegaly. Dilated main pulmonary artery

compatible with pulmonary artery hypertension.



Diffuse atherosclerotic arterial calcifications including of the coronary

arteries.



Right and left lower thyroid nodules measuring up to 2.5 cm on the left and 1.6

cm on the right. Recommend follow-up thyroid ultrasound on a nonurgent basis

for further evaluation.



Signed by: Harman Ochoa MD on 12/16/2019 4:17 PM Pt ambulatory to ER from home c/o worsening shortness of breath and back pain. Pt reported that he flew back in from [place] this past Wednesday night. Pt reported that he became sick shortly after and stayed mostly in bed on Thursday and Friday. Per pt, he woke up Saturday with back pain and shortness of breath. Pt reported using his inhaler 3x times on Saturday and self administered a nebulizer treatment without relief. Pt reported self administering a nebulizer treatment today as well without relief.

## 2020-03-24 ENCOUNTER — HOSPITAL ENCOUNTER (INPATIENT)
Dept: HOSPITAL 88 - ER | Age: 60
LOS: 3 days | Discharge: NURSING, REHAB OR LONG-TERM CARE | DRG: 291 | End: 2020-03-27
Attending: INTERNAL MEDICINE | Admitting: INTERNAL MEDICINE
Payer: MEDICARE

## 2020-03-24 VITALS — DIASTOLIC BLOOD PRESSURE: 60 MMHG | SYSTOLIC BLOOD PRESSURE: 128 MMHG

## 2020-03-24 VITALS — SYSTOLIC BLOOD PRESSURE: 121 MMHG | DIASTOLIC BLOOD PRESSURE: 65 MMHG

## 2020-03-24 VITALS — DIASTOLIC BLOOD PRESSURE: 65 MMHG | SYSTOLIC BLOOD PRESSURE: 121 MMHG

## 2020-03-24 VITALS — DIASTOLIC BLOOD PRESSURE: 54 MMHG | SYSTOLIC BLOOD PRESSURE: 125 MMHG

## 2020-03-24 VITALS — SYSTOLIC BLOOD PRESSURE: 120 MMHG | DIASTOLIC BLOOD PRESSURE: 62 MMHG

## 2020-03-24 VITALS — BODY MASS INDEX: 53.73 KG/M2 | WEIGHT: 292 LBS | HEIGHT: 62 IN

## 2020-03-24 VITALS — SYSTOLIC BLOOD PRESSURE: 128 MMHG | DIASTOLIC BLOOD PRESSURE: 60 MMHG

## 2020-03-24 DIAGNOSIS — E11.51: ICD-10-CM

## 2020-03-24 DIAGNOSIS — E78.5: ICD-10-CM

## 2020-03-24 DIAGNOSIS — Z79.4: ICD-10-CM

## 2020-03-24 DIAGNOSIS — Z95.5: ICD-10-CM

## 2020-03-24 DIAGNOSIS — E66.01: ICD-10-CM

## 2020-03-24 DIAGNOSIS — G47.33: ICD-10-CM

## 2020-03-24 DIAGNOSIS — E11.22: ICD-10-CM

## 2020-03-24 DIAGNOSIS — N17.9: ICD-10-CM

## 2020-03-24 DIAGNOSIS — G43.909: ICD-10-CM

## 2020-03-24 DIAGNOSIS — J96.10: ICD-10-CM

## 2020-03-24 DIAGNOSIS — N18.3: ICD-10-CM

## 2020-03-24 DIAGNOSIS — I50.33: ICD-10-CM

## 2020-03-24 DIAGNOSIS — Z87.891: ICD-10-CM

## 2020-03-24 DIAGNOSIS — I13.0: Primary | ICD-10-CM

## 2020-03-24 DIAGNOSIS — Z79.01: ICD-10-CM

## 2020-03-24 DIAGNOSIS — Z99.81: ICD-10-CM

## 2020-03-24 DIAGNOSIS — J44.1: ICD-10-CM

## 2020-03-24 DIAGNOSIS — I48.0: ICD-10-CM

## 2020-03-24 LAB
ALBUMIN SERPL-MCNC: 3 G/DL (ref 3.5–5)
ALBUMIN/GLOB SERPL: 0.8 {RATIO} (ref 0.8–2)
ALP SERPL-CCNC: 110 IU/L (ref 40–150)
ALT SERPL-CCNC: 10 IU/L (ref 0–55)
ANION GAP SERPL CALC-SCNC: 10.4 MMOL/L (ref 8–16)
BASE EXCESS BLDA CALC-SCNC: 8 MMOL/L (ref -2–3)
BASOPHILS # BLD AUTO: 0 10*3/UL (ref 0–0.1)
BASOPHILS NFR BLD AUTO: 0.4 % (ref 0–1)
BUN SERPL-MCNC: 55 MG/DL (ref 7–26)
BUN/CREAT SERPL: 29 (ref 6–25)
CALCIUM SERPL-MCNC: 9.1 MG/DL (ref 8.4–10.2)
CHLORIDE SERPL-SCNC: 98 MMOL/L (ref 98–107)
CK MB SERPL-MCNC: 1.6 NG/ML (ref 0–5)
CK SERPL-CCNC: 59 IU/L (ref 29–168)
CO2 SERPL-SCNC: 34 MMOL/L (ref 22–29)
DEPRECATED NEUTROPHILS # BLD AUTO: 6.9 10*3/UL (ref 2.1–6.9)
EGFRCR SERPLBLD CKD-EPI 2021: 27 ML/MIN (ref 60–?)
EOSINOPHIL # BLD AUTO: 0.3 10*3/UL (ref 0–0.4)
EOSINOPHIL NFR BLD AUTO: 2.8 % (ref 0–6)
ERYTHROCYTE [DISTWIDTH] IN CORD BLOOD: 14.3 % (ref 11.7–14.4)
FERRITIN SERPL-MCNC: 59.46 NG/ML (ref 4.63–204)
GLOBULIN PLAS-MCNC: 3.7 G/DL (ref 2.3–3.5)
GLUCOSE SERPLBLD-MCNC: 236 MG/DL (ref 74–118)
HCO3 BLDA-SCNC: 34 MMOL/L (ref 23–28)
HCT VFR BLD AUTO: 27.1 % (ref 34.2–44.1)
HGB BLD-MCNC: 8.4 G/DL (ref 12–16)
IRON SATN MFR SERPL: 10 % (ref 15–50)
IRON SERPL-MCNC: 37 UG/DL (ref 50–170)
LYMPHOCYTES # BLD: 1.6 10*3/UL (ref 1–3.2)
LYMPHOCYTES NFR BLD AUTO: 16.6 % (ref 18–39.1)
MCH RBC QN AUTO: 29.7 PG (ref 28–32)
MCHC RBC AUTO-ENTMCNC: 31 G/DL (ref 31–35)
MCV RBC AUTO: 95.8 FL (ref 81–99)
MONOCYTES # BLD AUTO: 0.7 10*3/UL (ref 0.2–0.8)
MONOCYTES NFR BLD AUTO: 7 % (ref 4.4–11.3)
NEUTS SEG NFR BLD AUTO: 72.8 % (ref 38.7–80)
PCO2 BLDA: (no result) MMHG (ref 80–105)
PCO2 BLDA: 61 MMHG (ref 41–51)
PH BLDA: 7.35 [PH] (ref 7.31–7.41)
PLATELET # BLD AUTO: 226 X10E3/UL (ref 140–360)
POTASSIUM SERPL-SCNC: 4.4 MMOL/L (ref 3.5–5.1)
RBC # BLD AUTO: 2.83 X10E6/UL (ref 3.6–5.1)
SAO2 % BLDA: 90 % (ref 95–98)
SODIUM SERPL-SCNC: 138 MMOL/L (ref 136–145)
TIBC SERPL-MCNC: 361 UG/DL (ref 261–478)
TRANSFERRIN SERPL-MCNC: 258 MG/DL (ref 180–382)

## 2020-03-24 PROCEDURE — 36415 COLL VENOUS BLD VENIPUNCTURE: CPT

## 2020-03-24 PROCEDURE — 82553 CREATINE MB FRACTION: CPT

## 2020-03-24 PROCEDURE — 82805 BLOOD GASES W/O2 SATURATION: CPT

## 2020-03-24 PROCEDURE — 82746 ASSAY OF FOLIC ACID SERUM: CPT

## 2020-03-24 PROCEDURE — 87205 SMEAR GRAM STAIN: CPT

## 2020-03-24 PROCEDURE — 82948 REAGENT STRIP/BLOOD GLUCOSE: CPT

## 2020-03-24 PROCEDURE — 83880 ASSAY OF NATRIURETIC PEPTIDE: CPT

## 2020-03-24 PROCEDURE — 80048 BASIC METABOLIC PNL TOTAL CA: CPT

## 2020-03-24 PROCEDURE — 93005 ELECTROCARDIOGRAM TRACING: CPT

## 2020-03-24 PROCEDURE — 87070 CULTURE OTHR SPECIMN AEROBIC: CPT

## 2020-03-24 PROCEDURE — 96374 THER/PROPH/DIAG INJ IV PUSH: CPT

## 2020-03-24 PROCEDURE — 82270 OCCULT BLOOD FECES: CPT

## 2020-03-24 PROCEDURE — 85045 AUTOMATED RETICULOCYTE COUNT: CPT

## 2020-03-24 PROCEDURE — 82728 ASSAY OF FERRITIN: CPT

## 2020-03-24 PROCEDURE — 80198 ASSAY OF THEOPHYLLINE: CPT

## 2020-03-24 PROCEDURE — 82550 ASSAY OF CK (CPK): CPT

## 2020-03-24 PROCEDURE — 83735 ASSAY OF MAGNESIUM: CPT

## 2020-03-24 PROCEDURE — 99284 EMERGENCY DEPT VISIT MOD MDM: CPT

## 2020-03-24 PROCEDURE — 78580 LUNG PERFUSION IMAGING: CPT

## 2020-03-24 PROCEDURE — 80053 COMPREHEN METABOLIC PANEL: CPT

## 2020-03-24 PROCEDURE — 36600 WITHDRAWAL OF ARTERIAL BLOOD: CPT

## 2020-03-24 PROCEDURE — 71046 X-RAY EXAM CHEST 2 VIEWS: CPT

## 2020-03-24 PROCEDURE — 84484 ASSAY OF TROPONIN QUANT: CPT

## 2020-03-24 PROCEDURE — 94640 AIRWAY INHALATION TREATMENT: CPT

## 2020-03-24 PROCEDURE — 83540 ASSAY OF IRON: CPT

## 2020-03-24 PROCEDURE — 84466 ASSAY OF TRANSFERRIN: CPT

## 2020-03-24 PROCEDURE — 93970 EXTREMITY STUDY: CPT

## 2020-03-24 PROCEDURE — 85025 COMPLETE CBC W/AUTO DIFF WBC: CPT

## 2020-03-24 PROCEDURE — 97139 UNLISTED THERAPEUTIC PX: CPT

## 2020-03-24 PROCEDURE — 83036 HEMOGLOBIN GLYCOSYLATED A1C: CPT

## 2020-03-24 PROCEDURE — 82607 VITAMIN B-12: CPT

## 2020-03-24 PROCEDURE — 93306 TTE W/DOPPLER COMPLETE: CPT

## 2020-03-24 PROCEDURE — 84100 ASSAY OF PHOSPHORUS: CPT

## 2020-03-24 PROCEDURE — 87040 BLOOD CULTURE FOR BACTERIA: CPT

## 2020-03-24 PROCEDURE — 71045 X-RAY EXAM CHEST 1 VIEW: CPT

## 2020-03-24 PROCEDURE — 87400 INFLUENZA A/B EACH AG IA: CPT

## 2020-03-24 RX ADMIN — Medication SCH MG: at 16:56

## 2020-03-24 RX ADMIN — Medication SCH MG: at 21:58

## 2020-03-24 RX ADMIN — NYSTATIN SCH EA: 100000 CREAM TOPICAL at 17:03

## 2020-03-24 RX ADMIN — APIXABAN SCH MG: 5 TABLET, FILM COATED ORAL at 16:56

## 2020-03-24 RX ADMIN — INSULIN GLARGINE SCH UNITS: 100 INJECTION, SOLUTION SUBCUTANEOUS at 16:53

## 2020-03-24 RX ADMIN — SERTRALINE HYDROCHLORIDE SCH MG: 100 TABLET, FILM COATED ORAL at 21:58

## 2020-03-24 RX ADMIN — HYDROCODONE BITARTRATE AND ACETAMINOPHEN PRN EA: 10; 325 TABLET ORAL at 16:57

## 2020-03-24 RX ADMIN — BUMETANIDE SCH MG: 1 TABLET ORAL at 21:58

## 2020-03-24 NOTE — CONSULTATION
DATE OF CONSULTATION:  

 

Cardiology Consultation 

 

REASON FOR CONSULTATION:  Shortness of breath.

 

HISTORY OF PRESENT ILLNESS:  This is a 59-year-old woman, who has a history of

congestive heart failure, coronary artery disease, obstructive sleep apnea, chronic

respiratory failure, chronic obstructive pulmonary disease, and diabetes mellitus.  The

patient is quite lethargic, cannot provide me the exact details of her presenting

symptoms.  However, does report some atypical chest discomfort and shortness of breath. 

 

REVIEW OF SYSTEMS:

Unable to be obtained due to altered mental status.

 

PAST MEDICAL HISTORY:  As stated above.

 

PAST SURGICAL HISTORY:  None recent.

 

PAST FAMILY HISTORY:  Noncontributory to current illness.

 

ALLERGIES:  MULTIPLE.  SEE CHART.

 

MEDICATIONS:  Please see medications reconciliation form.

 

PHYSICAL EXAMINATION:

VITAL SIGNS:  Temperature is 97.1, heart rate is 94, respirations are 20, blood pressure

is 120/62, and oxygen saturation is 95% on 4 L nasal cannula. 

GENERAL:  Chronically ill-appearing, no apparent distress.  Alert and intermittently

oriented. 

HEAD:  Normocephalic and atraumatic. 

EYES:  The extraocular muscles are intact.  Conjunctivae are clear. 

NECK:  No JVD.  No bruits. 

CARDIOVASCULAR:  Regular rate and rhythm. 

LUNGS:  Clear to auscultation with minimal rales at the bases. 

ABDOMEN:  Soft, nontender, nondistended. 

EXTREMITIES:  No clubbing, cyanosis, or edema. 

VASCULAR:  2+ pulses. 

SKIN:  Warm, dry, and intact. 

NEUROLOGIC:  No focal deficits noted.  Cranial nerves grossly intact. 

PSYCHIATRIC:  Normal mood and affect.

LABORATORY DATA:  All laboratory data were reviewed and showed hemoglobin 8.4.  Troponin

was normal x1.  BNP was mildly elevated at 229.  Chest x-ray shows mild cardiomegaly

with pulmonary edema. 

 

IMPRESSION:  

1. Acute-on-chronic diastolic heart failure.

2. Chronic obstructive pulmonary disease. 

3. Hypertension.

4. Hyperlipidemia.

5. Obstructive sleep apnea.

6. Morbid obesity.

 

RECOMMENDATIONS:  Continued diuresis with Bumex.  We will check an echocardiogram.

Continue all other current cardiovascular medications.  Treatment of her COPD and

shortness of breath, otherwise per primary care. 

 

 

 

 

______________________________

Silviano Mohr DO

 

BM/MODL

D:  03/24/2020 16:44:14

T:  03/24/2020 17:37:42

Job #:  [id number]
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
## 2020-03-24 NOTE — DIAGNOSTIC IMAGING REPORT
Perfusion lung scan only



Note: The Society of Nuclear Medicine and Molecular Imaging recommends not

performing lung ventilation studies with xenon because there is no way to

assure that the ventilation system used for the study can be adequately

disinfected.  Alternative of using nebulized Tc-99m DTPA for ventilation

studies is absolutely contraindicated due to the airborne droplets created.



Clinical Information: Pulmonary edema; SOB



Comparison: Chest radiograph 3/24/2020.



Discussion: No ventilation images were obtained.  See note above.



Perfusion images of the lungs were obtained in multiple projections following

intravenous administration of approximately 6 mCi of Tc-99m MAA. Distribution

of tracer is irregular throughout the lungs.  The contours of the lungs are

well demarcated.  There are no segmental perfusion defects of any size.  



The cardiomediastinal silhouette is enlarged.



Impression: 



1.  Scan findings represent a VERY LOW probability for acute pulmonary embolic

disease based on the PIOPED II criteria.  A ventilation lung scan would not

have changed this assigned probability.

2.  Enlarged cardiac silhouette.



Signed by: Dr. Rica Hernandez M.D. on 3/24/2020 5:26 PM

## 2020-03-24 NOTE — DIAGNOSTIC IMAGING REPORT
EXAMINATION:  CHEST SINGLE (PORTABLE)    



INDICATION:      Chest pain



COMPARISON:  Chest CT 12/16/2019, chest x-ray 12/16/2019

     

FINDINGS:    



TUBES and LINES:  None.



LUNGS:  Normal lung volumes.       Prominent pulmonary vasculature, and

pulmonary interstitium, with central and lower lung haziness.



PLEURA: Small bilateral pleural effusions. No pneumothorax. 



HEART AND MEDIASTINUM:  Cardiac size is mildly enlarged. 



BONES AND SOFT TISSUES:  No acute osseous lesion.  Soft tissues are

unremarkable.



UPPER ABDOMEN: No free air under the diaphragm. 



IMPRESSION: 



Mild cardiomegaly and pulmonary edema.



Signed by: Carl Rosa DO on 3/24/2020 5:09 AM

## 2020-03-25 VITALS — DIASTOLIC BLOOD PRESSURE: 60 MMHG | SYSTOLIC BLOOD PRESSURE: 136 MMHG

## 2020-03-25 VITALS — DIASTOLIC BLOOD PRESSURE: 60 MMHG | SYSTOLIC BLOOD PRESSURE: 135 MMHG

## 2020-03-25 VITALS — DIASTOLIC BLOOD PRESSURE: 57 MMHG | SYSTOLIC BLOOD PRESSURE: 142 MMHG

## 2020-03-25 VITALS — DIASTOLIC BLOOD PRESSURE: 60 MMHG | SYSTOLIC BLOOD PRESSURE: 130 MMHG

## 2020-03-25 VITALS — SYSTOLIC BLOOD PRESSURE: 142 MMHG | DIASTOLIC BLOOD PRESSURE: 57 MMHG

## 2020-03-25 VITALS — SYSTOLIC BLOOD PRESSURE: 173 MMHG | DIASTOLIC BLOOD PRESSURE: 84 MMHG

## 2020-03-25 VITALS — DIASTOLIC BLOOD PRESSURE: 68 MMHG | SYSTOLIC BLOOD PRESSURE: 146 MMHG

## 2020-03-25 VITALS — DIASTOLIC BLOOD PRESSURE: 57 MMHG | SYSTOLIC BLOOD PRESSURE: 134 MMHG

## 2020-03-25 LAB
ANION GAP SERPL CALC-SCNC: 9.6 MMOL/L (ref 8–16)
BASOPHILS # BLD AUTO: 0.1 10*3/UL (ref 0–0.1)
BASOPHILS NFR BLD AUTO: 0.6 % (ref 0–1)
BUN SERPL-MCNC: 50 MG/DL (ref 7–26)
BUN/CREAT SERPL: 26 (ref 6–25)
CALCIUM SERPL-MCNC: 8.9 MG/DL (ref 8.4–10.2)
CHLORIDE SERPL-SCNC: 101 MMOL/L (ref 98–107)
CO2 SERPL-SCNC: 35 MMOL/L (ref 22–29)
DEPRECATED NEUTROPHILS # BLD AUTO: 5.9 10*3/UL (ref 2.1–6.9)
DEPRECATED PHOSPHATE SERPL-MCNC: 4.1 MG/DL (ref 2.3–4.7)
EGFRCR SERPLBLD CKD-EPI 2021: 26 ML/MIN (ref 60–?)
EOSINOPHIL # BLD AUTO: 0.2 10*3/UL (ref 0–0.4)
EOSINOPHIL NFR BLD AUTO: 2.8 % (ref 0–6)
ERYTHROCYTE [DISTWIDTH] IN CORD BLOOD: 14.1 % (ref 11.7–14.4)
FERRITIN SERPL-MCNC: 63.28 NG/ML (ref 4.63–204)
GLUCOSE SERPLBLD-MCNC: 145 MG/DL (ref 74–118)
HCT VFR BLD AUTO: 28 % (ref 34.2–44.1)
HGB BLD-MCNC: 8.4 G/DL (ref 12–16)
IRON SATN MFR SERPL: 8 % (ref 15–50)
IRON SERPL-MCNC: 28 UG/DL (ref 50–170)
LYMPHOCYTES # BLD: 1.4 10*3/UL (ref 1–3.2)
LYMPHOCYTES NFR BLD AUTO: 17.1 % (ref 18–39.1)
MAGNESIUM SERPL-MCNC: 2.1 MG/DL (ref 1.3–2.1)
MCH RBC QN AUTO: 29.4 PG (ref 28–32)
MCHC RBC AUTO-ENTMCNC: 30 G/DL (ref 31–35)
MCV RBC AUTO: 97.9 FL (ref 81–99)
MONOCYTES # BLD AUTO: 0.6 10*3/UL (ref 0.2–0.8)
MONOCYTES NFR BLD AUTO: 7.6 % (ref 4.4–11.3)
NEUTS SEG NFR BLD AUTO: 71.2 % (ref 38.7–80)
PLATELET # BLD AUTO: 222 X10E3/UL (ref 140–360)
POTASSIUM SERPL-SCNC: 4.6 MMOL/L (ref 3.5–5.1)
RBC # BLD AUTO: 2.86 X10E6/UL (ref 3.6–5.1)
SODIUM SERPL-SCNC: 141 MMOL/L (ref 136–145)
TIBC SERPL-MCNC: 368 UG/DL (ref 261–478)
TRANSFERRIN SERPL-MCNC: 263 MG/DL (ref 180–382)

## 2020-03-25 RX ADMIN — EZETIMIBE SCH MG: 10 TABLET ORAL at 09:00

## 2020-03-25 RX ADMIN — Medication SCH MG: at 16:49

## 2020-03-25 RX ADMIN — NYSTATIN SCH EA: 100000 CREAM TOPICAL at 09:00

## 2020-03-25 RX ADMIN — ESTROGENS, CONJUGATED SCH MG: 0.62 TABLET, FILM COATED ORAL at 09:00

## 2020-03-25 RX ADMIN — BUMETANIDE SCH MG: 1 TABLET ORAL at 09:00

## 2020-03-25 RX ADMIN — PANTOPRAZOLE SODIUM SCH MG: 40 TABLET, DELAYED RELEASE ORAL at 07:30

## 2020-03-25 RX ADMIN — Medication SCH MG: at 09:00

## 2020-03-25 RX ADMIN — NYSTATIN SCH EA: 100000 CREAM TOPICAL at 17:00

## 2020-03-25 RX ADMIN — FLUTICASONE PROPIONATE AND SALMETEROL SCH EA: 50; 250 POWDER RESPIRATORY (INHALATION) at 17:34

## 2020-03-25 RX ADMIN — Medication SCH MG: at 20:50

## 2020-03-25 RX ADMIN — METOPROLOL TARTRATE SCH MG: 25 TABLET, FILM COATED ORAL at 09:00

## 2020-03-25 RX ADMIN — HYDROCODONE BITARTRATE AND ACETAMINOPHEN PRN EA: 10; 325 TABLET ORAL at 10:16

## 2020-03-25 RX ADMIN — INSULIN HUMAN SCH UNIT: 100 INJECTION, SOLUTION PARENTERAL at 21:00

## 2020-03-25 RX ADMIN — IPRATROPIUM BROMIDE AND ALBUTEROL SULFATE PRN ML: .5; 2.5 SOLUTION RESPIRATORY (INHALATION) at 03:05

## 2020-03-25 RX ADMIN — SODIUM CHLORIDE SCH MLS/HR: 9 INJECTION, SOLUTION INTRAVENOUS at 10:00

## 2020-03-25 RX ADMIN — BUMETANIDE SCH MG: 1 TABLET ORAL at 20:50

## 2020-03-25 RX ADMIN — SERTRALINE HYDROCHLORIDE SCH MG: 100 TABLET, FILM COATED ORAL at 20:50

## 2020-03-25 RX ADMIN — INSULIN GLARGINE SCH UNITS: 100 INJECTION, SOLUTION SUBCUTANEOUS at 09:00

## 2020-03-25 RX ADMIN — IPRATROPIUM BROMIDE AND ALBUTEROL SULFATE PRN ML: .5; 2.5 SOLUTION RESPIRATORY (INHALATION) at 20:20

## 2020-03-25 RX ADMIN — TIOTROPIUM BROMIDE SCH MCG: 18 CAPSULE ORAL; RESPIRATORY (INHALATION) at 17:34

## 2020-03-25 RX ADMIN — INSULIN GLARGINE SCH UNITS: 100 INJECTION, SOLUTION SUBCUTANEOUS at 17:00

## 2020-03-25 RX ADMIN — APIXABAN SCH MG: 5 TABLET, FILM COATED ORAL at 16:49

## 2020-03-25 RX ADMIN — INSULIN HUMAN SCH UNIT: 100 INJECTION, SOLUTION PARENTERAL at 16:30

## 2020-03-25 RX ADMIN — APIXABAN SCH MG: 5 TABLET, FILM COATED ORAL at 09:00

## 2020-03-25 RX ADMIN — HYDROCODONE BITARTRATE AND ACETAMINOPHEN PRN EA: 10; 325 TABLET ORAL at 18:39

## 2020-03-25 NOTE — NUR
Patient received sitting up in bed. AAO x 4. Patient had no complaints of pain. No signs of 
respiratory distress. Fall precautions implemented. Patient instructed to call for 
assistance when needed. Call light within reach.

## 2020-03-25 NOTE — DIAGNOSTIC IMAGING REPORT
EXAMINATION:  CHEST 2 VIEWS    



INDICATION:      

^SOB

^[id number]

^0600



COMPARISON:  Chest x-ray 3/24/2020

     

FINDINGS:  



TUBES and LINES:  None.



LUNGS:  Normal lung volumes.       There is perihilar interstitial opacities,

consistent with interstitial edema. Lower lung haziness.



PLEURA:  Small bilateral pleural effusions. No pneumothorax.



HEART AND MEDIASTINUM:  Cardiac size is moderately enlarged.    



BONES AND SOFT TISSUES:  No acute osseous lesion.  Soft tissues are

unremarkable.



UPPER ABDOMEN: No free air under the diaphragm.    



IMPRESSION:

 

Mild cardiomegaly and pulmonary edema small bilateral pleural effusions.

Superimposed pneumonia is possible.



Signed by: Carl Rosa DO on 3/25/2020 7:16 AM

## 2020-03-25 NOTE — PROGRESS NOTE
DATE:  

 

Cardiology Progress Note 

 

SUBJECTIVE:  The patient is feeling better.  Some shortness of breath.  No chest pain.

 

OBJECTIVE:  VITAL SIGNS:  Temperature is 98.3, heart rate 80, blood pressure 146/68, and

oxygen saturation is 94% on 4 L nasal cannula. 

GENERAL:  No apparent distress.  Alert and oriented x3. 

CARDIOVASCULAR:  She is regular rate and rhythm. 

LUNGS:  Diminished breath sounds at bases. 

ABDOMEN:  Soft, nontender, and nondistended. 

EXTREMITIES:  Trace edema.

 

IMPRESSION:  

1. Acute-on-chronic diastolic heart failure.

2. Chronic obstructive pulmonary disease.

3. Hypertension.

4. Hyperlipidemia.

5. Obstructive sleep apnea.

6. Morbid obesity.

 

PLAN:  Continue current cardiovascular medication regimen including Bumex for diuresis.

We will check a 2D echocardiogram.  Treatment for COPD and shortness of breath per

Pulmonary Critical Care.  We will continue to follow along. 

 

 

 

 

______________________________

DO MAGDALENA Coelho/CESILIA

D:  03/25/2020 17:23:23

T:  03/25/2020 17:49:48

Job #:  [id number]

## 2020-03-25 NOTE — NUR
RCD PT AT BED PT IS ALERT AND ORIENTED  RESTING ON BED IV PATENT BY SALINE FLUSH BED LOW AND 
LOCKED CALL LIGHT IN REACH

## 2020-03-26 VITALS — DIASTOLIC BLOOD PRESSURE: 60 MMHG | SYSTOLIC BLOOD PRESSURE: 127 MMHG

## 2020-03-26 VITALS — SYSTOLIC BLOOD PRESSURE: 120 MMHG | DIASTOLIC BLOOD PRESSURE: 61 MMHG

## 2020-03-26 VITALS — SYSTOLIC BLOOD PRESSURE: 119 MMHG | DIASTOLIC BLOOD PRESSURE: 57 MMHG

## 2020-03-26 VITALS — SYSTOLIC BLOOD PRESSURE: 126 MMHG | DIASTOLIC BLOOD PRESSURE: 67 MMHG

## 2020-03-26 VITALS — DIASTOLIC BLOOD PRESSURE: 78 MMHG | SYSTOLIC BLOOD PRESSURE: 129 MMHG

## 2020-03-26 VITALS — DIASTOLIC BLOOD PRESSURE: 61 MMHG | SYSTOLIC BLOOD PRESSURE: 120 MMHG

## 2020-03-26 VITALS — SYSTOLIC BLOOD PRESSURE: 135 MMHG | DIASTOLIC BLOOD PRESSURE: 62 MMHG

## 2020-03-26 VITALS — SYSTOLIC BLOOD PRESSURE: 129 MMHG | DIASTOLIC BLOOD PRESSURE: 78 MMHG

## 2020-03-26 LAB
ANION GAP SERPL CALC-SCNC: 9.5 MMOL/L (ref 8–16)
BASOPHILS # BLD AUTO: 0 10*3/UL (ref 0–0.1)
BASOPHILS NFR BLD AUTO: 0.4 % (ref 0–1)
BUN SERPL-MCNC: 48 MG/DL (ref 7–26)
BUN/CREAT SERPL: 28 (ref 6–25)
CALCIUM SERPL-MCNC: 9 MG/DL (ref 8.4–10.2)
CHLORIDE SERPL-SCNC: 101 MMOL/L (ref 98–107)
CO2 SERPL-SCNC: 35 MMOL/L (ref 22–29)
DEPRECATED NEUTROPHILS # BLD AUTO: 6.6 10*3/UL (ref 2.1–6.9)
EGFRCR SERPLBLD CKD-EPI 2021: 31 ML/MIN (ref 60–?)
EOSINOPHIL # BLD AUTO: 0.1 10*3/UL (ref 0–0.4)
EOSINOPHIL NFR BLD AUTO: 0.9 % (ref 0–6)
ERYTHROCYTE [DISTWIDTH] IN CORD BLOOD: 14.2 % (ref 11.7–14.4)
GLUCOSE SERPLBLD-MCNC: 82 MG/DL (ref 74–118)
HCT VFR BLD AUTO: 26.6 % (ref 34.2–44.1)
HGB BLD-MCNC: 8 G/DL (ref 12–16)
LYMPHOCYTES # BLD: 1.8 10*3/UL (ref 1–3.2)
LYMPHOCYTES NFR BLD AUTO: 19.2 % (ref 18–39.1)
MCH RBC QN AUTO: 29.1 PG (ref 28–32)
MCHC RBC AUTO-ENTMCNC: 30.1 G/DL (ref 31–35)
MCV RBC AUTO: 96.7 FL (ref 81–99)
MONOCYTES # BLD AUTO: 0.7 10*3/UL (ref 0.2–0.8)
MONOCYTES NFR BLD AUTO: 7.1 % (ref 4.4–11.3)
NEUTS SEG NFR BLD AUTO: 71.2 % (ref 38.7–80)
PLATELET # BLD AUTO: 225 X10E3/UL (ref 140–360)
POTASSIUM SERPL-SCNC: 4.5 MMOL/L (ref 3.5–5.1)
RBC # BLD AUTO: 2.75 X10E6/UL (ref 3.6–5.1)
SODIUM SERPL-SCNC: 141 MMOL/L (ref 136–145)

## 2020-03-26 RX ADMIN — NYSTATIN SCH EA: 100000 CREAM TOPICAL at 17:00

## 2020-03-26 RX ADMIN — INSULIN GLARGINE SCH UNITS: 100 INJECTION, SOLUTION SUBCUTANEOUS at 17:00

## 2020-03-26 RX ADMIN — BUMETANIDE SCH MG: 1 TABLET ORAL at 08:56

## 2020-03-26 RX ADMIN — ESTROGENS, CONJUGATED SCH MG: 0.62 TABLET, FILM COATED ORAL at 08:57

## 2020-03-26 RX ADMIN — IPRATROPIUM BROMIDE AND ALBUTEROL SULFATE PRN ML: .5; 2.5 SOLUTION RESPIRATORY (INHALATION) at 07:48

## 2020-03-26 RX ADMIN — FLUTICASONE PROPIONATE AND SALMETEROL SCH EA: 50; 250 POWDER RESPIRATORY (INHALATION) at 07:48

## 2020-03-26 RX ADMIN — Medication SCH MG: at 08:57

## 2020-03-26 RX ADMIN — INSULIN HUMAN SCH UNIT: 100 INJECTION, SOLUTION PARENTERAL at 22:59

## 2020-03-26 RX ADMIN — PREDNISONE SCH MG: 10 TABLET ORAL at 08:57

## 2020-03-26 RX ADMIN — EZETIMIBE SCH MG: 10 TABLET ORAL at 08:58

## 2020-03-26 RX ADMIN — INSULIN HUMAN SCH UNIT: 100 INJECTION, SOLUTION PARENTERAL at 11:30

## 2020-03-26 RX ADMIN — IPRATROPIUM BROMIDE AND ALBUTEROL SULFATE PRN ML: .5; 2.5 SOLUTION RESPIRATORY (INHALATION) at 19:45

## 2020-03-26 RX ADMIN — INSULIN HUMAN SCH UNIT: 100 INJECTION, SOLUTION PARENTERAL at 16:30

## 2020-03-26 RX ADMIN — BUMETANIDE SCH MG: 1 TABLET ORAL at 21:47

## 2020-03-26 RX ADMIN — SODIUM CHLORIDE SCH MLS/HR: 9 INJECTION, SOLUTION INTRAVENOUS at 10:00

## 2020-03-26 RX ADMIN — INSULIN HUMAN SCH UNIT: 100 INJECTION, SOLUTION PARENTERAL at 07:30

## 2020-03-26 RX ADMIN — TIOTROPIUM BROMIDE SCH MCG: 18 CAPSULE ORAL; RESPIRATORY (INHALATION) at 07:48

## 2020-03-26 RX ADMIN — PANTOPRAZOLE SODIUM SCH MG: 40 TABLET, DELAYED RELEASE ORAL at 07:30

## 2020-03-26 RX ADMIN — HYDROCODONE BITARTRATE AND ACETAMINOPHEN PRN EA: 10; 325 TABLET ORAL at 21:38

## 2020-03-26 RX ADMIN — SERTRALINE HYDROCHLORIDE SCH MG: 100 TABLET, FILM COATED ORAL at 21:47

## 2020-03-26 RX ADMIN — HYDROCODONE BITARTRATE AND ACETAMINOPHEN PRN EA: 10; 325 TABLET ORAL at 08:40

## 2020-03-26 RX ADMIN — IPRATROPIUM BROMIDE AND ALBUTEROL SULFATE PRN ML: .5; 2.5 SOLUTION RESPIRATORY (INHALATION) at 14:10

## 2020-03-26 RX ADMIN — INSULIN GLARGINE SCH UNITS: 100 INJECTION, SOLUTION SUBCUTANEOUS at 08:58

## 2020-03-26 RX ADMIN — Medication SCH MG: at 17:00

## 2020-03-26 RX ADMIN — DOXYCYCLINE SCH MLS/HR: 100 INJECTION, POWDER, LYOPHILIZED, FOR SOLUTION INTRAVENOUS at 23:14

## 2020-03-26 RX ADMIN — APIXABAN SCH MG: 5 TABLET, FILM COATED ORAL at 08:56

## 2020-03-26 RX ADMIN — HYDROCODONE BITARTRATE AND ACETAMINOPHEN PRN EA: 10; 325 TABLET ORAL at 20:02

## 2020-03-26 RX ADMIN — Medication SCH MG: at 21:47

## 2020-03-26 RX ADMIN — NYSTATIN SCH EA: 100000 CREAM TOPICAL at 08:58

## 2020-03-26 RX ADMIN — METOPROLOL TARTRATE SCH MG: 25 TABLET, FILM COATED ORAL at 08:56

## 2020-03-26 RX ADMIN — APIXABAN SCH MG: 5 TABLET, FILM COATED ORAL at 17:00

## 2020-03-26 NOTE — NUR
PT SIGNED CHOICE FOR [facility] FILED IN CHOICE IN CHART, OBTAINED 
SIGNATURES FOR COVID ASSESSMENT FAXED CLINICALS COMPLETED RTF AND PUT WITH PACKET AT NURSES 
STATION.

## 2020-03-26 NOTE — NUR
BEDSIDE SHIFT REPORT RECEIVED FROM DAY NURSE.PT IS ALERT AND ORIENTED X3. PT SITTING IN 
SEMI-FOWLERS POSITION IN BED WATCHING TV. O2 AT 4L PER N/C. RESPIRATIONS ARE EVEN AND 
UNLABORED. TELE ON. DENIES PAIN. 20 G WSL IN RT AC. UP TO BEDSIDE COMMODE. PT VOIDED 800 ML 
AND HAD MODERATE FORMED BM. PERICARE GIVEN BY NURSE. CALL LIGHT WITHIN REACH. BED IN LOW 
POSITION.

## 2020-03-26 NOTE — DIAGNOSTIC IMAGING REPORT
Chest, 1 view,  3/26/2020.   

 



History: Pulmonary edema.



Comparison: 3/25/2020, 3/24/2020.



Findings: The cardiomediastinal silhouette and pulmonary vasculature are

prominent with hazy bilateral perihilar and bibasilar opacities and minimal

blunting of the costophrenic sulci. There are no acute osseous or soft tissue

abnormalities. 



Impression: 

Mild CHF without significant change.



Signed by: Kimani Khanna on 3/26/2020 2:07 PM

## 2020-03-27 VITALS — DIASTOLIC BLOOD PRESSURE: 57 MMHG | SYSTOLIC BLOOD PRESSURE: 118 MMHG

## 2020-03-27 VITALS — DIASTOLIC BLOOD PRESSURE: 56 MMHG | SYSTOLIC BLOOD PRESSURE: 121 MMHG

## 2020-03-27 VITALS — DIASTOLIC BLOOD PRESSURE: 61 MMHG | SYSTOLIC BLOOD PRESSURE: 129 MMHG

## 2020-03-27 VITALS — SYSTOLIC BLOOD PRESSURE: 121 MMHG | DIASTOLIC BLOOD PRESSURE: 56 MMHG

## 2020-03-27 VITALS — SYSTOLIC BLOOD PRESSURE: 134 MMHG | DIASTOLIC BLOOD PRESSURE: 61 MMHG

## 2020-03-27 LAB
ANION GAP SERPL CALC-SCNC: 8.9 MMOL/L (ref 8–16)
BASOPHILS # BLD AUTO: 0.1 10*3/UL (ref 0–0.1)
BASOPHILS NFR BLD AUTO: 0.6 % (ref 0–1)
BUN SERPL-MCNC: 47 MG/DL (ref 7–26)
BUN/CREAT SERPL: 29 (ref 6–25)
CALCIUM SERPL-MCNC: 9 MG/DL (ref 8.4–10.2)
CHLORIDE SERPL-SCNC: 102 MMOL/L (ref 98–107)
CO2 SERPL-SCNC: 34 MMOL/L (ref 22–29)
DEPRECATED NEUTROPHILS # BLD AUTO: 6.9 10*3/UL (ref 2.1–6.9)
EGFRCR SERPLBLD CKD-EPI 2021: 33 ML/MIN (ref 60–?)
EOSINOPHIL # BLD AUTO: 0.1 10*3/UL (ref 0–0.4)
EOSINOPHIL NFR BLD AUTO: 1.2 % (ref 0–6)
ERYTHROCYTE [DISTWIDTH] IN CORD BLOOD: 14.3 % (ref 11.7–14.4)
GLUCOSE SERPLBLD-MCNC: 92 MG/DL (ref 74–118)
HCT VFR BLD AUTO: 27.9 % (ref 34.2–44.1)
HGB BLD-MCNC: 8.2 G/DL (ref 12–16)
LYMPHOCYTES # BLD: 2.1 10*3/UL (ref 1–3.2)
LYMPHOCYTES NFR BLD AUTO: 20.7 % (ref 18–39.1)
MAGNESIUM SERPL-MCNC: 1.9 MG/DL (ref 1.3–2.1)
MCH RBC QN AUTO: 29 PG (ref 28–32)
MCHC RBC AUTO-ENTMCNC: 29.4 G/DL (ref 31–35)
MCV RBC AUTO: 98.6 FL (ref 81–99)
MONOCYTES # BLD AUTO: 0.9 10*3/UL (ref 0.2–0.8)
MONOCYTES NFR BLD AUTO: 8.4 % (ref 4.4–11.3)
NEUTS SEG NFR BLD AUTO: 67.6 % (ref 38.7–80)
PLATELET # BLD AUTO: 231 X10E3/UL (ref 140–360)
POTASSIUM SERPL-SCNC: 4.9 MMOL/L (ref 3.5–5.1)
RBC # BLD AUTO: 2.83 X10E6/UL (ref 3.6–5.1)
SODIUM SERPL-SCNC: 140 MMOL/L (ref 136–145)

## 2020-03-27 RX ADMIN — NYSTATIN SCH EA: 100000 CREAM TOPICAL at 11:42

## 2020-03-27 RX ADMIN — HYDROCODONE BITARTRATE AND ACETAMINOPHEN PRN EA: 10; 325 TABLET ORAL at 11:39

## 2020-03-27 RX ADMIN — TIOTROPIUM BROMIDE SCH MCG: 18 CAPSULE ORAL; RESPIRATORY (INHALATION) at 07:23

## 2020-03-27 RX ADMIN — INSULIN HUMAN SCH UNIT: 100 INJECTION, SOLUTION PARENTERAL at 07:30

## 2020-03-27 RX ADMIN — SODIUM CHLORIDE SCH MLS/HR: 9 INJECTION, SOLUTION INTRAVENOUS at 10:44

## 2020-03-27 RX ADMIN — INSULIN HUMAN SCH UNIT: 100 INJECTION, SOLUTION PARENTERAL at 11:30

## 2020-03-27 RX ADMIN — Medication SCH MG: at 08:29

## 2020-03-27 RX ADMIN — INSULIN GLARGINE SCH UNITS: 100 INJECTION, SOLUTION SUBCUTANEOUS at 08:34

## 2020-03-27 RX ADMIN — METOPROLOL TARTRATE SCH MG: 25 TABLET, FILM COATED ORAL at 08:30

## 2020-03-27 RX ADMIN — PANTOPRAZOLE SODIUM SCH MG: 40 TABLET, DELAYED RELEASE ORAL at 08:28

## 2020-03-27 RX ADMIN — APIXABAN SCH MG: 5 TABLET, FILM COATED ORAL at 08:28

## 2020-03-27 RX ADMIN — NYSTATIN SCH EA: 100000 CREAM TOPICAL at 09:00

## 2020-03-27 RX ADMIN — FLUTICASONE PROPIONATE AND SALMETEROL SCH EA: 50; 250 POWDER RESPIRATORY (INHALATION) at 07:23

## 2020-03-27 RX ADMIN — IPRATROPIUM BROMIDE AND ALBUTEROL SULFATE PRN ML: .5; 2.5 SOLUTION RESPIRATORY (INHALATION) at 07:23

## 2020-03-27 RX ADMIN — DOXYCYCLINE SCH MLS/HR: 100 INJECTION, POWDER, LYOPHILIZED, FOR SOLUTION INTRAVENOUS at 08:31

## 2020-03-27 RX ADMIN — EZETIMIBE SCH MG: 10 TABLET ORAL at 08:29

## 2020-03-27 RX ADMIN — PREDNISONE SCH MG: 10 TABLET ORAL at 08:28

## 2020-03-27 RX ADMIN — ESTROGENS, CONJUGATED SCH MG: 0.62 TABLET, FILM COATED ORAL at 08:30

## 2020-03-27 RX ADMIN — BUMETANIDE SCH MG: 1 TABLET ORAL at 08:28

## 2020-03-27 NOTE — NUR
PATIENT REFUSES NYSTATIN CR, STATES SHE HAS NO NEED FOR IT AT THIS TIME.  STATES SHE USUALLY 
USES IT BENEATH BREASTS WHEN SHE HAS A RASH

## 2020-03-27 NOTE — NUR
SKILLED NURSING FACILITY DISCHARGE INFORMATION 

PATIENT HAS BEEN ACCEPTED TO: 

NAME: [facility] 

ADDRESS:[id number], [id number]



ACCEPTING MD:OLEGARIO

ROOM:[id number]

NURSE CALL REPORT TO: [phone]

IMM SIGNED AND OBTAINED (if applicable): IMM

THE FOLLOWING DOCUMENTS MUST ACCOMPANY PATIENT FOR TRANSFER:

COPIED CHART: CHART

## 2020-03-28 ENCOUNTER — HOSPITAL ENCOUNTER (INPATIENT)
Dept: HOSPITAL 88 - ER | Age: 60
LOS: 5 days | Discharge: HOME | DRG: 291 | End: 2020-04-02
Attending: INTERNAL MEDICINE | Admitting: INTERNAL MEDICINE
Payer: MEDICARE

## 2020-03-28 VITALS — HEIGHT: 62 IN | BODY MASS INDEX: 53.92 KG/M2 | WEIGHT: 293 LBS

## 2020-03-28 DIAGNOSIS — Z99.81: ICD-10-CM

## 2020-03-28 DIAGNOSIS — E78.5: ICD-10-CM

## 2020-03-28 DIAGNOSIS — J96.22: ICD-10-CM

## 2020-03-28 DIAGNOSIS — E66.2: ICD-10-CM

## 2020-03-28 DIAGNOSIS — Z80.9: ICD-10-CM

## 2020-03-28 DIAGNOSIS — I13.0: Primary | ICD-10-CM

## 2020-03-28 DIAGNOSIS — Z83.3: ICD-10-CM

## 2020-03-28 DIAGNOSIS — Z90.710: ICD-10-CM

## 2020-03-28 DIAGNOSIS — Z82.49: ICD-10-CM

## 2020-03-28 DIAGNOSIS — K21.9: ICD-10-CM

## 2020-03-28 DIAGNOSIS — E11.22: ICD-10-CM

## 2020-03-28 DIAGNOSIS — Z87.891: ICD-10-CM

## 2020-03-28 DIAGNOSIS — J96.21: ICD-10-CM

## 2020-03-28 DIAGNOSIS — Z88.2: ICD-10-CM

## 2020-03-28 DIAGNOSIS — Z98.890: ICD-10-CM

## 2020-03-28 DIAGNOSIS — I48.0: ICD-10-CM

## 2020-03-28 DIAGNOSIS — Z88.8: ICD-10-CM

## 2020-03-28 DIAGNOSIS — K80.80: ICD-10-CM

## 2020-03-28 DIAGNOSIS — Z79.01: ICD-10-CM

## 2020-03-28 DIAGNOSIS — Z88.0: ICD-10-CM

## 2020-03-28 DIAGNOSIS — I50.33: ICD-10-CM

## 2020-03-28 DIAGNOSIS — J44.9: ICD-10-CM

## 2020-03-28 DIAGNOSIS — N18.3: ICD-10-CM

## 2020-03-28 DIAGNOSIS — Z82.3: ICD-10-CM

## 2020-03-28 DIAGNOSIS — Z79.4: ICD-10-CM

## 2020-03-28 LAB
ALBUMIN SERPL-MCNC: 3 G/DL (ref 3.5–5)
ALBUMIN/GLOB SERPL: 0.9 {RATIO} (ref 0.8–2)
ALP SERPL-CCNC: 95 IU/L (ref 40–150)
ALT SERPL-CCNC: 10 IU/L (ref 0–55)
ANION GAP SERPL CALC-SCNC: 11.1 MMOL/L (ref 8–16)
BASE EXCESS BLDA CALC-SCNC: 7 MMOL/L (ref -2–3)
BASE EXCESS BLDA CALC-SCNC: 8 MMOL/L (ref -2–3)
BASOPHILS # BLD AUTO: 0.1 10*3/UL (ref 0–0.1)
BASOPHILS NFR BLD AUTO: 0.6 % (ref 0–1)
BUN SERPL-MCNC: 46 MG/DL (ref 7–26)
BUN/CREAT SERPL: 28 (ref 6–25)
CALCIUM SERPL-MCNC: 9.1 MG/DL (ref 8.4–10.2)
CHLORIDE SERPL-SCNC: 102 MMOL/L (ref 98–107)
CK MB SERPL-MCNC: 1.4 NG/ML (ref 0–5)
CK MB SERPL-MCNC: 1.5 NG/ML (ref 0–5)
CK SERPL-CCNC: 45 IU/L (ref 29–168)
CK SERPL-CCNC: 53 IU/L (ref 29–168)
CO2 SERPL-SCNC: 34 MMOL/L (ref 22–29)
DEPRECATED APTT PLAS QN: 33.7 SECONDS (ref 23.8–35.5)
DEPRECATED INR PLAS: 1.07
DEPRECATED NEUTROPHILS # BLD AUTO: 7 10*3/UL (ref 2.1–6.9)
EGFRCR SERPLBLD CKD-EPI 2021: 33 ML/MIN (ref 60–?)
EOSINOPHIL # BLD AUTO: 0.3 10*3/UL (ref 0–0.4)
EOSINOPHIL NFR BLD AUTO: 2.5 % (ref 0–6)
ERYTHROCYTE [DISTWIDTH] IN CORD BLOOD: 14.6 % (ref 11.7–14.4)
GLOBULIN PLAS-MCNC: 3.2 G/DL (ref 2.3–3.5)
GLUCOSE SERPLBLD-MCNC: 168 MG/DL (ref 74–118)
HCO3 BLDA-SCNC: 34 MMOL/L (ref 23–28)
HCO3 BLDA-SCNC: 34 MMOL/L (ref 23–28)
HCT VFR BLD AUTO: 28.3 % (ref 34.2–44.1)
HGB BLD-MCNC: 8.5 G/DL (ref 12–16)
LYMPHOCYTES # BLD: 1.7 10*3/UL (ref 1–3.2)
LYMPHOCYTES NFR BLD AUTO: 17.5 % (ref 18–39.1)
MCH RBC QN AUTO: 29.5 PG (ref 28–32)
MCHC RBC AUTO-ENTMCNC: 30 G/DL (ref 31–35)
MCV RBC AUTO: 98.3 FL (ref 81–99)
MONOCYTES # BLD AUTO: 0.8 10*3/UL (ref 0.2–0.8)
MONOCYTES NFR BLD AUTO: 8.4 % (ref 4.4–11.3)
NEUTS SEG NFR BLD AUTO: 70.1 % (ref 38.7–80)
PCO2 BLDA: (no result) MMHG (ref 80–105)
PCO2 BLDA: (no result) MMHG (ref 80–105)
PCO2 BLDA: 64 MMHG (ref 41–51)
PCO2 BLDA: 70 MMHG (ref 41–51)
PH BLDA: 7.29 [PH] (ref 7.31–7.41)
PH BLDA: 7.33 [PH] (ref 7.31–7.41)
PLATELET # BLD AUTO: 223 X10E3/UL (ref 140–360)
POTASSIUM SERPL-SCNC: 4.1 MMOL/L (ref 3.5–5.1)
PROTHROMBIN TIME: 14.6 SECONDS (ref 11.9–14.5)
RBC # BLD AUTO: 2.88 X10E6/UL (ref 3.6–5.1)
SAO2 % BLDA: 97 % (ref 95–98)
SODIUM SERPL-SCNC: 143 MMOL/L (ref 136–145)

## 2020-03-28 PROCEDURE — 76705 ECHO EXAM OF ABDOMEN: CPT

## 2020-03-28 PROCEDURE — 85730 THROMBOPLASTIN TIME PARTIAL: CPT

## 2020-03-28 PROCEDURE — 84479 ASSAY OF THYROID (T3 OR T4): CPT

## 2020-03-28 PROCEDURE — 82948 REAGENT STRIP/BLOOD GLUCOSE: CPT

## 2020-03-28 PROCEDURE — 80061 LIPID PANEL: CPT

## 2020-03-28 PROCEDURE — 85610 PROTHROMBIN TIME: CPT

## 2020-03-28 PROCEDURE — 82550 ASSAY OF CK (CPK): CPT

## 2020-03-28 PROCEDURE — 84443 ASSAY THYROID STIM HORMONE: CPT

## 2020-03-28 PROCEDURE — 84100 ASSAY OF PHOSPHORUS: CPT

## 2020-03-28 PROCEDURE — 99285 EMERGENCY DEPT VISIT HI MDM: CPT

## 2020-03-28 PROCEDURE — 84484 ASSAY OF TROPONIN QUANT: CPT

## 2020-03-28 PROCEDURE — 96372 THER/PROPH/DIAG INJ SC/IM: CPT

## 2020-03-28 PROCEDURE — 83735 ASSAY OF MAGNESIUM: CPT

## 2020-03-28 PROCEDURE — 78227 HEPATOBIL SYST IMAGE W/DRUG: CPT

## 2020-03-28 PROCEDURE — 80053 COMPREHEN METABOLIC PANEL: CPT

## 2020-03-28 PROCEDURE — 71250 CT THORAX DX C-: CPT

## 2020-03-28 PROCEDURE — 36415 COLL VENOUS BLD VENIPUNCTURE: CPT

## 2020-03-28 PROCEDURE — 71045 X-RAY EXAM CHEST 1 VIEW: CPT

## 2020-03-28 PROCEDURE — 94660 CPAP INITIATION&MGMT: CPT

## 2020-03-28 PROCEDURE — 84436 ASSAY OF TOTAL THYROXINE: CPT

## 2020-03-28 PROCEDURE — 94640 AIRWAY INHALATION TREATMENT: CPT

## 2020-03-28 PROCEDURE — 36600 WITHDRAWAL OF ARTERIAL BLOOD: CPT

## 2020-03-28 PROCEDURE — 97139 UNLISTED THERAPEUTIC PX: CPT

## 2020-03-28 PROCEDURE — 93005 ELECTROCARDIOGRAM TRACING: CPT

## 2020-03-28 PROCEDURE — 83880 ASSAY OF NATRIURETIC PEPTIDE: CPT

## 2020-03-28 PROCEDURE — 80048 BASIC METABOLIC PNL TOTAL CA: CPT

## 2020-03-28 PROCEDURE — 85025 COMPLETE CBC W/AUTO DIFF WBC: CPT

## 2020-03-28 PROCEDURE — 82553 CREATINE MB FRACTION: CPT

## 2020-03-28 PROCEDURE — 82805 BLOOD GASES W/O2 SATURATION: CPT

## 2020-03-28 PROCEDURE — 83690 ASSAY OF LIPASE: CPT

## 2020-03-28 RX ADMIN — SERTRALINE HYDROCHLORIDE SCH MG: 50 TABLET, FILM COATED ORAL at 20:45

## 2020-03-28 RX ADMIN — METHYLPREDNISOLONE SODIUM SUCCINATE SCH MG: 40 INJECTION, POWDER, LYOPHILIZED, FOR SOLUTION INTRAMUSCULAR; INTRAVENOUS at 20:45

## 2020-03-28 RX ADMIN — FUROSEMIDE SCH MG: 10 INJECTION, SOLUTION INTRAMUSCULAR; INTRAVENOUS at 20:45

## 2020-03-28 RX ADMIN — Medication SCH MG: at 20:45

## 2020-03-28 RX ADMIN — INSULIN LISPRO SCH UNIT: 100 INJECTION, SOLUTION INTRAVENOUS; SUBCUTANEOUS at 19:07

## 2020-03-28 RX ADMIN — IPRATROPIUM BROMIDE AND ALBUTEROL SULFATE SCH ML: .5; 2.5 SOLUTION RESPIRATORY (INHALATION) at 19:05

## 2020-03-28 RX ADMIN — INSULIN LISPRO SCH UNIT: 100 INJECTION, SOLUTION INTRAVENOUS; SUBCUTANEOUS at 21:02

## 2020-03-28 RX ADMIN — IPRATROPIUM BROMIDE AND ALBUTEROL SULFATE SCH ML: .5; 2.5 SOLUTION RESPIRATORY (INHALATION) at 23:35

## 2020-03-28 RX ADMIN — IPRATROPIUM BROMIDE AND ALBUTEROL SULFATE SCH ML: .5; 2.5 SOLUTION RESPIRATORY (INHALATION) at 15:00

## 2020-03-28 RX ADMIN — SODIUM CHLORIDE SCH MLS/HR: 900 INJECTION, SOLUTION INTRAVENOUS at 13:25

## 2020-03-28 NOTE — CONSULTATION
DATE OF CONSULTATION:  

 

Pulmonary Critical Care Consultation 

 

HISTORY OF PRESENT ILLNESS:  The patient is a 59-year-old woman.  She has a history of

COPD, obesity-hypoventilation syndrome, and obstructive sleep apnea.  She also has a

history of hypertension, diabetes, and diastolic heart failure.  She uses a trilogy home

ventilator.  She also has oxygen at home as well as nebulizers. 

 

She was recently hospitalized at [facility] with worsening dyspnea and

acute exacerbation of her diastolic heart failure.  She was discharged yesterday, but

had more trouble breathing and had to return today.  She denies any chest pain.  She is

not having any fevers or cough. 

 

PAST SURGICAL HISTORY:  Status post cardiac stent 15 years ago.

 

PAST MEDICAL HISTORY:  

1. Diastolic heart failure.

2. Obstructive sleep apnea.

3. Chronic obstructive pulmonary disease.

4. Hypertension.

5. Diabetes.

 

SOCIAL HISTORY:  The patient was a prior smoker, but is not currently smoking.  She is

not a drinker. 

 

FAMILY HISTORY:  Noncontributory.

 

ALLERGIES:  THE PATIENT IS ALLERGIC TO PENICILLIN AND SULFA.

 

REVIEW OF SYSTEMS:

The patient denies fevers.  She is not having any headache.  She does not have chest

pain.  She does have some dyspnea on exertion.  She has no nausea or vomiting.  She has

no leg edema. 

 

She does not complain of focal neurological problems.

 

PHYSICAL EXAMINATION:

VITAL SIGNS:  The blood pressure is 129/86 and the pulse is 94.  Saturation is 98%. 

HEENT:  Shows no facial swelling or erythema. 

CARDIAC:  Reveals regular rate and rhythm with a normal S1 and S2. 

LUNGS:  Auscultation of lungs reveals decreased breath sounds at the bases.  There is no

wheezing. 

ABDOMEN:  Soft and nontender.  There is no rebound or guarding. 

EXTREMITIES:  Shows 1 to 2+ leg edema. 

NEUROLOGICAL:  Shows no focal abnormalities.

LABORATORY DATA:  White blood cell count is 9.9 and hemoglobin is 8.5.  The platelet

count is 223.  The BUN to creatinine ratio is 46 to 1.62.  The other electrolytes are

within normal limits and the BNP is 447.  The blood gas is 7.29 with pCO2 of 70 and a

bicarbonate of 34. 

 

RADIOGRAPHIC DATA:  Chest x-ray shows bilateral infiltrate, suggestive of pulmonary

edema. 

 

IMPRESSION:  

1. Acute-on-chronic respiratory failure.

2. Acute-on-chronic diastolic heart failure.

3. Obesity-hypoventilation syndrome.

4. Chronic obstructive pulmonary disease.

5. Diabetes.

6. Hypertension.

7. Atrial fibrillation.

 

PLAN:  

1. The patient will receive diuretics.

2. Continue BiPAP and monitor respiratory status.

3. Continue Solu-Medrol as well as bronchodilators.

4. Cardiology evaluation.

5. The patient is seen by Dr. Muhammad and Dr. Meraz in the clinic on a regular basis.  I

will contact them and they will assume care tomorrow. 

 

 

 

 

______________________________

MD BETH Miller/MODL

D:  03/28/2020 19:57:46

T:  03/28/2020 21:20:48

Job #:  [id number]

## 2020-03-28 NOTE — NUR
Patient breathing better at this time and remains on BiPap. No significant 
distress noted. Will continue to monitor patient. Call light at bedside and bed 
low and locked.

## 2020-03-28 NOTE — DIAGNOSTIC IMAGING REPORT
EXAMINATION:  CHEST SINGLE (PORTABLE)    



INDICATION:       

^sob

^[id number]

^1350    



COMPARISON:  3/26/2020

     

FINDINGS:  AP view   



TUBES and LINES:  None.



LUNGS:  Lungs are well inflated.  Worsening bilateral pulmonary edema.  

Bibasal atelectasis.



PLEURA:  Small bilateral pleural effusion, increased. No pneumothorax.



HEART AND MEDIASTINUM:  Marked enlargement of the cardiac silhouette is

unchanged.  Enlarged pulmonary arteries consistent with pulmonary hypertension.



BONES AND SOFT TISSUES:  No acute osseous lesion.  Soft tissues are

unremarkable.



UPPER ABDOMEN: No free air under the diaphragm.    



IMPRESSION: 

Worsening bilateral pulmonary edema.





Signed by: Dr. Isabel Rowe M.D. on 3/28/2020 2:03 PM

## 2020-03-28 NOTE — NUR
Assumed patient care at this time. Patient on BIpap with no distress noted. VS 
stable. Will continue to monitor patient. 

-------------------------------------------------------------------------------

Addendum: 03/28/20 at 1923 by ZULEYKA

-------------------------------------------------------------------------------

Mild distress noted*. Patient states she is breathing better on BiPap than when 
she arrived. Will continue to monitor patient closely.

## 2020-03-29 VITALS — DIASTOLIC BLOOD PRESSURE: 61 MMHG | SYSTOLIC BLOOD PRESSURE: 132 MMHG

## 2020-03-29 VITALS — SYSTOLIC BLOOD PRESSURE: 130 MMHG | DIASTOLIC BLOOD PRESSURE: 63 MMHG

## 2020-03-29 LAB
ALBUMIN SERPL-MCNC: 3.1 G/DL (ref 3.5–5)
ALBUMIN/GLOB SERPL: 0.8 {RATIO} (ref 0.8–2)
ALP SERPL-CCNC: 103 IU/L (ref 40–150)
ALT SERPL-CCNC: 11 IU/L (ref 0–55)
ANION GAP SERPL CALC-SCNC: 12.8 MMOL/L (ref 8–16)
BASE EXCESS BLDA CALC-SCNC: 9 MMOL/L (ref -2–3)
BASOPHILS # BLD AUTO: 0 10*3/UL (ref 0–0.1)
BASOPHILS NFR BLD AUTO: 0.4 % (ref 0–1)
BUN SERPL-MCNC: 43 MG/DL (ref 7–26)
BUN/CREAT SERPL: 27 (ref 6–25)
CALCIUM SERPL-MCNC: 9.4 MG/DL (ref 8.4–10.2)
CHLORIDE SERPL-SCNC: 102 MMOL/L (ref 98–107)
CK MB SERPL-MCNC: 1.5 NG/ML (ref 0–5)
CK SERPL-CCNC: 26 IU/L (ref 29–168)
CO2 SERPL-SCNC: 34 MMOL/L (ref 22–29)
DEPRECATED NEUTROPHILS # BLD AUTO: 5.1 10*3/UL (ref 2.1–6.9)
EGFRCR SERPLBLD CKD-EPI 2021: 33 ML/MIN (ref 60–?)
EOSINOPHIL # BLD AUTO: 0 10*3/UL (ref 0–0.4)
EOSINOPHIL NFR BLD AUTO: 0 % (ref 0–6)
ERYTHROCYTE [DISTWIDTH] IN CORD BLOOD: 14.5 % (ref 11.7–14.4)
GLOBULIN PLAS-MCNC: 3.7 G/DL (ref 2.3–3.5)
GLUCOSE SERPLBLD-MCNC: 177 MG/DL (ref 74–118)
HCO3 BLDA-SCNC: 34 MMOL/L (ref 23–28)
HCT VFR BLD AUTO: 30.5 % (ref 34.2–44.1)
HGB BLD-MCNC: 9.3 G/DL (ref 12–16)
LYMPHOCYTES # BLD: 0.4 10*3/UL (ref 1–3.2)
LYMPHOCYTES NFR BLD AUTO: 6.7 % (ref 18–39.1)
MCH RBC QN AUTO: 29.5 PG (ref 28–32)
MCHC RBC AUTO-ENTMCNC: 30.5 G/DL (ref 31–35)
MCV RBC AUTO: 96.8 FL (ref 81–99)
MONOCYTES # BLD AUTO: 0 10*3/UL (ref 0.2–0.8)
MONOCYTES NFR BLD AUTO: 0.7 % (ref 4.4–11.3)
NEUTS SEG NFR BLD AUTO: 91.1 % (ref 38.7–80)
PCO2 BLDA: (no result) MMHG (ref 80–105)
PCO2 BLDA: 58 MMHG (ref 41–51)
PH BLDA: 7.38 [PH] (ref 7.31–7.41)
PLATELET # BLD AUTO: 240 X10E3/UL (ref 140–360)
POTASSIUM SERPL-SCNC: 4.8 MMOL/L (ref 3.5–5.1)
RBC # BLD AUTO: 3.15 X10E6/UL (ref 3.6–5.1)
SAO2 % BLDA: 96 % (ref 95–98)
SODIUM SERPL-SCNC: 144 MMOL/L (ref 136–145)

## 2020-03-29 PROCEDURE — 5A09357 ASSISTANCE WITH RESPIRATORY VENTILATION, LESS THAN 24 CONSECUTIVE HOURS, CONTINUOUS POSITIVE AIRWAY PRESSURE: ICD-10-PCS | Performed by: EMERGENCY MEDICINE

## 2020-03-29 RX ADMIN — EZETIMIBE SCH MG: 10 TABLET ORAL at 09:00

## 2020-03-29 RX ADMIN — SODIUM CHLORIDE PRN MG: 900 INJECTION INTRAVENOUS at 11:13

## 2020-03-29 RX ADMIN — IPRATROPIUM BROMIDE AND ALBUTEROL SULFATE SCH ML: .5; 2.5 SOLUTION RESPIRATORY (INHALATION) at 04:20

## 2020-03-29 RX ADMIN — METOPROLOL TARTRATE SCH MG: 25 TABLET, FILM COATED ORAL at 09:00

## 2020-03-29 RX ADMIN — METHYLPREDNISOLONE SODIUM SUCCINATE SCH MG: 40 INJECTION, POWDER, LYOPHILIZED, FOR SOLUTION INTRAMUSCULAR; INTRAVENOUS at 20:38

## 2020-03-29 RX ADMIN — SODIUM CHLORIDE SCH MLS/HR: 900 INJECTION, SOLUTION INTRAVENOUS at 14:00

## 2020-03-29 RX ADMIN — FUROSEMIDE SCH MG: 10 INJECTION, SOLUTION INTRAMUSCULAR; INTRAVENOUS at 09:00

## 2020-03-29 RX ADMIN — FUROSEMIDE SCH MG: 10 INJECTION, SOLUTION INTRAMUSCULAR; INTRAVENOUS at 20:38

## 2020-03-29 RX ADMIN — IPRATROPIUM BROMIDE AND ALBUTEROL SULFATE SCH ML: .5; 2.5 SOLUTION RESPIRATORY (INHALATION) at 11:05

## 2020-03-29 RX ADMIN — ESTROGENS, CONJUGATED SCH MG: 0.62 TABLET, FILM COATED ORAL at 09:00

## 2020-03-29 RX ADMIN — TIOTROPIUM BROMIDE SCH MCG: 18 CAPSULE ORAL; RESPIRATORY (INHALATION) at 08:30

## 2020-03-29 RX ADMIN — HYDROCODONE BITARTRATE AND ACETAMINOPHEN PRN EA: 10; 325 TABLET ORAL at 17:57

## 2020-03-29 RX ADMIN — APIXABAN SCH MG: 5 TABLET, FILM COATED ORAL at 09:00

## 2020-03-29 RX ADMIN — INSULIN LISPRO SCH UNIT: 100 INJECTION, SOLUTION INTRAVENOUS; SUBCUTANEOUS at 07:36

## 2020-03-29 RX ADMIN — IPRATROPIUM BROMIDE AND ALBUTEROL SULFATE SCH ML: .5; 2.5 SOLUTION RESPIRATORY (INHALATION) at 06:15

## 2020-03-29 RX ADMIN — IPRATROPIUM BROMIDE AND ALBUTEROL SULFATE SCH ML: .5; 2.5 SOLUTION RESPIRATORY (INHALATION) at 22:40

## 2020-03-29 RX ADMIN — FLUTICASONE PROPIONATE AND SALMETEROL SCH EA: 50; 250 POWDER RESPIRATORY (INHALATION) at 08:30

## 2020-03-29 RX ADMIN — INSULIN GLARGINE SCH UNITS: 100 INJECTION, SOLUTION SUBCUTANEOUS at 09:00

## 2020-03-29 RX ADMIN — Medication SCH MG: at 20:38

## 2020-03-29 RX ADMIN — INSULIN LISPRO SCH UNIT: 100 INJECTION, SOLUTION INTRAVENOUS; SUBCUTANEOUS at 20:41

## 2020-03-29 RX ADMIN — PANTOPRAZOLE SODIUM SCH MG: 40 TABLET, DELAYED RELEASE ORAL at 07:36

## 2020-03-29 RX ADMIN — PANTOPRAZOLE SODIUM SCH MG: 40 TABLET, DELAYED RELEASE ORAL at 11:30

## 2020-03-29 RX ADMIN — INSULIN LISPRO SCH UNIT: 100 INJECTION, SOLUTION INTRAVENOUS; SUBCUTANEOUS at 12:03

## 2020-03-29 RX ADMIN — SERTRALINE HYDROCHLORIDE SCH MG: 50 TABLET, FILM COATED ORAL at 20:38

## 2020-03-29 RX ADMIN — INSULIN LISPRO SCH UNIT: 100 INJECTION, SOLUTION INTRAVENOUS; SUBCUTANEOUS at 17:03

## 2020-03-29 RX ADMIN — IPRATROPIUM BROMIDE AND ALBUTEROL SULFATE SCH ML: .5; 2.5 SOLUTION RESPIRATORY (INHALATION) at 14:30

## 2020-03-29 RX ADMIN — INSULIN GLARGINE SCH UNITS: 100 INJECTION, SOLUTION SUBCUTANEOUS at 20:40

## 2020-03-29 RX ADMIN — METHYLPREDNISOLONE SODIUM SUCCINATE SCH MG: 40 INJECTION, POWDER, LYOPHILIZED, FOR SOLUTION INTRAMUSCULAR; INTRAVENOUS at 09:00

## 2020-03-29 RX ADMIN — APIXABAN SCH MG: 5 TABLET, FILM COATED ORAL at 17:03

## 2020-03-29 RX ADMIN — IPRATROPIUM BROMIDE AND ALBUTEROL SULFATE SCH ML: .5; 2.5 SOLUTION RESPIRATORY (INHALATION) at 19:05

## 2020-03-29 NOTE — NUR
PT ARRIVED FROM ED DEPT ON HOSPITAL BED, PT WAS CONNECTED TO 10L VENTI MASK, BIPAP AT 
BEDSIDE, PT HAS PUREWICK CATHETER IN PLACE AND CONNECTED TO SUCTION, INITAL ASSESSMENT 
COMPLETED, CALL LIGHT IN REACH, ALL BELONGINGS PLACED WITHIN REACH OF PATIENT, PT DID SAY 
THAT SHE WAS STILL NAUSEATED, EMESIS BAG WAS GIVEN TO PATIENT.

## 2020-03-29 NOTE — NUR
Patient resting with no distress noted. RR even and unlabored at this time. 
Call light at bedside. Instructed to call if she needed assistance. Patient 
verbalized understanding.

## 2020-03-29 NOTE — DIAGNOSTIC IMAGING REPORT
EXAMINATION:  CHEST SINGLE (PORTABLE)    



INDICATION:  Bilateral infiltrates



COMPARISON:  3/28 /2020

     

FINDINGS:  AP view   



TUBES and LINES:  None.



LUNGS: No significant change in bilateral pulmonary edema.   Bibasilar patchy

airspace disease, atelectasis versus consolidation.



PLEURA:  Small bilateral pleural effusion, increased. No pneumothorax.



HEART AND MEDIASTINUM:  Marked enlargement of the cardiac silhouette is

unchanged.  Enlarged pulmonary arteries consistent with pulmonary hypertension.



BONES AND SOFT TISSUES:  No acute osseous lesion.  Soft tissues are

unremarkable.



UPPER ABDOMEN: No free air under the diaphragm.    



IMPRESSION: 

Stable bilateral pulmonary edema.

Bibasilar patchy densities may represent atelectasis or superimposed infection

proper clinical setting.



Signed by: Dr. MARIUSZ Fernandes M.D. on 3/29/2020 8:46 AM

## 2020-03-29 NOTE — NUR
Patient continues to rest with no distress noted. RR even and unlabored with no 
distress noted. Will continue to monitor patient. Call light remains at 
bedside.

## 2020-03-30 VITALS — SYSTOLIC BLOOD PRESSURE: 140 MMHG | DIASTOLIC BLOOD PRESSURE: 66 MMHG

## 2020-03-30 VITALS — SYSTOLIC BLOOD PRESSURE: 150 MMHG | DIASTOLIC BLOOD PRESSURE: 76 MMHG

## 2020-03-30 VITALS — DIASTOLIC BLOOD PRESSURE: 66 MMHG | SYSTOLIC BLOOD PRESSURE: 144 MMHG

## 2020-03-30 VITALS — DIASTOLIC BLOOD PRESSURE: 65 MMHG | SYSTOLIC BLOOD PRESSURE: 139 MMHG

## 2020-03-30 VITALS — SYSTOLIC BLOOD PRESSURE: 130 MMHG | DIASTOLIC BLOOD PRESSURE: 65 MMHG

## 2020-03-30 VITALS — DIASTOLIC BLOOD PRESSURE: 65 MMHG | SYSTOLIC BLOOD PRESSURE: 130 MMHG

## 2020-03-30 VITALS — DIASTOLIC BLOOD PRESSURE: 54 MMHG | SYSTOLIC BLOOD PRESSURE: 141 MMHG

## 2020-03-30 VITALS — DIASTOLIC BLOOD PRESSURE: 68 MMHG | SYSTOLIC BLOOD PRESSURE: 143 MMHG

## 2020-03-30 LAB
ANION GAP SERPL CALC-SCNC: 14.6 MMOL/L (ref 8–16)
BASOPHILS # BLD AUTO: 0 10*3/UL (ref 0–0.1)
BASOPHILS NFR BLD AUTO: 0.1 % (ref 0–1)
BUN SERPL-MCNC: 53 MG/DL (ref 7–26)
BUN/CREAT SERPL: 28 (ref 6–25)
CALCIUM SERPL-MCNC: 9.3 MG/DL (ref 8.4–10.2)
CHLORIDE SERPL-SCNC: 99 MMOL/L (ref 98–107)
CHOLEST SERPL-MCNC: 144 MD/DL (ref 0–199)
CHOLEST/HDLC SERPL: 2.7 {RATIO} (ref 3–3.6)
CO2 SERPL-SCNC: 36 MMOL/L (ref 22–29)
DEPRECATED FTI SERPL-MCNC: 3.83 UG/DL (ref 1.4–3.8)
DEPRECATED NEUTROPHILS # BLD AUTO: 8.4 10*3/UL (ref 2.1–6.9)
EGFRCR SERPLBLD CKD-EPI 2021: 28 ML/MIN (ref 60–?)
EOSINOPHIL # BLD AUTO: 0 10*3/UL (ref 0–0.4)
EOSINOPHIL NFR BLD AUTO: 0 % (ref 0–6)
ERYTHROCYTE [DISTWIDTH] IN CORD BLOOD: 14.7 % (ref 11.7–14.4)
GLUCOSE SERPLBLD-MCNC: 192 MG/DL (ref 74–118)
HCT VFR BLD AUTO: 29.9 % (ref 34.2–44.1)
HDLC SERPL-MSCNC: 53 MG/DL (ref 40–60)
HGB BLD-MCNC: 9.3 G/DL (ref 12–16)
LDLC SERPL CALC-MCNC: 72 MG/DL (ref 60–130)
LYMPHOCYTES # BLD: 0.9 10*3/UL (ref 1–3.2)
LYMPHOCYTES NFR BLD AUTO: 8.9 % (ref 18–39.1)
MAGNESIUM SERPL-MCNC: 1.8 MG/DL (ref 1.3–2.1)
MCH RBC QN AUTO: 30.2 PG (ref 28–32)
MCHC RBC AUTO-ENTMCNC: 31.1 G/DL (ref 31–35)
MCV RBC AUTO: 97.1 FL (ref 81–99)
MONOCYTES # BLD AUTO: 0.5 10*3/UL (ref 0.2–0.8)
MONOCYTES NFR BLD AUTO: 5.4 % (ref 4.4–11.3)
NEUTS SEG NFR BLD AUTO: 84.9 % (ref 38.7–80)
PLATELET # BLD AUTO: 232 X10E3/UL (ref 140–360)
POTASSIUM SERPL-SCNC: 4.6 MMOL/L (ref 3.5–5.1)
RBC # BLD AUTO: 3.08 X10E6/UL (ref 3.6–5.1)
SAO2 % BLDA: (no result) % (ref 95–98)
SODIUM SERPL-SCNC: 145 MMOL/L (ref 136–145)
TRIGL SERPL-MCNC: 93 MG/DL (ref 0–149)
TSH SERPL DL<=0.005 MIU/L-ACNC: 0.44 UIU/ML (ref 0.35–4.94)

## 2020-03-30 RX ADMIN — INSULIN LISPRO SCH UNIT: 100 INJECTION, SOLUTION INTRAVENOUS; SUBCUTANEOUS at 07:45

## 2020-03-30 RX ADMIN — APIXABAN SCH MG: 5 TABLET, FILM COATED ORAL at 08:16

## 2020-03-30 RX ADMIN — IPRATROPIUM BROMIDE AND ALBUTEROL SULFATE SCH ML: .5; 2.5 SOLUTION RESPIRATORY (INHALATION) at 15:30

## 2020-03-30 RX ADMIN — IPRATROPIUM BROMIDE AND ALBUTEROL SULFATE SCH ML: .5; 2.5 SOLUTION RESPIRATORY (INHALATION) at 19:35

## 2020-03-30 RX ADMIN — METOCLOPRAMIDE SCH MG: 10 TABLET ORAL at 11:43

## 2020-03-30 RX ADMIN — METOCLOPRAMIDE SCH MG: 10 TABLET ORAL at 22:00

## 2020-03-30 RX ADMIN — INSULIN GLARGINE SCH UNITS: 100 INJECTION, SOLUTION SUBCUTANEOUS at 22:00

## 2020-03-30 RX ADMIN — Medication SCH MG: at 22:00

## 2020-03-30 RX ADMIN — APIXABAN SCH MG: 5 TABLET, FILM COATED ORAL at 17:30

## 2020-03-30 RX ADMIN — INSULIN LISPRO SCH UNIT: 100 INJECTION, SOLUTION INTRAVENOUS; SUBCUTANEOUS at 17:30

## 2020-03-30 RX ADMIN — EZETIMIBE SCH MG: 10 TABLET ORAL at 08:16

## 2020-03-30 RX ADMIN — INSULIN LISPRO SCH UNIT: 100 INJECTION, SOLUTION INTRAVENOUS; SUBCUTANEOUS at 11:30

## 2020-03-30 RX ADMIN — FUROSEMIDE SCH MG: 10 INJECTION, SOLUTION INTRAMUSCULAR; INTRAVENOUS at 22:00

## 2020-03-30 RX ADMIN — METOPROLOL TARTRATE SCH MG: 25 TABLET, FILM COATED ORAL at 08:16

## 2020-03-30 RX ADMIN — ESTROGENS, CONJUGATED SCH MG: 0.62 TABLET, FILM COATED ORAL at 08:16

## 2020-03-30 RX ADMIN — TIOTROPIUM BROMIDE SCH MCG: 18 CAPSULE ORAL; RESPIRATORY (INHALATION) at 11:00

## 2020-03-30 RX ADMIN — FUROSEMIDE SCH MG: 10 INJECTION, SOLUTION INTRAMUSCULAR; INTRAVENOUS at 08:16

## 2020-03-30 RX ADMIN — METHYLPREDNISOLONE SODIUM SUCCINATE SCH MG: 40 INJECTION, POWDER, LYOPHILIZED, FOR SOLUTION INTRAMUSCULAR; INTRAVENOUS at 08:16

## 2020-03-30 RX ADMIN — IPRATROPIUM BROMIDE AND ALBUTEROL SULFATE SCH ML: .5; 2.5 SOLUTION RESPIRATORY (INHALATION) at 01:28

## 2020-03-30 RX ADMIN — METOCLOPRAMIDE SCH MG: 10 TABLET ORAL at 17:30

## 2020-03-30 RX ADMIN — FLUTICASONE PROPIONATE AND SALMETEROL SCH EA: 50; 250 POWDER RESPIRATORY (INHALATION) at 11:00

## 2020-03-30 RX ADMIN — HYDROCODONE BITARTRATE AND ACETAMINOPHEN PRN EA: 10; 325 TABLET ORAL at 08:16

## 2020-03-30 RX ADMIN — SODIUM CHLORIDE SCH MLS/HR: 900 INJECTION, SOLUTION INTRAVENOUS at 16:10

## 2020-03-30 RX ADMIN — INSULIN GLARGINE SCH UNITS: 100 INJECTION, SOLUTION SUBCUTANEOUS at 07:45

## 2020-03-30 RX ADMIN — SODIUM CHLORIDE PRN MG: 900 INJECTION INTRAVENOUS at 08:16

## 2020-03-30 RX ADMIN — IPRATROPIUM BROMIDE AND ALBUTEROL SULFATE SCH ML: .5; 2.5 SOLUTION RESPIRATORY (INHALATION) at 11:00

## 2020-03-30 RX ADMIN — IPRATROPIUM BROMIDE AND ALBUTEROL SULFATE SCH ML: .5; 2.5 SOLUTION RESPIRATORY (INHALATION) at 23:55

## 2020-03-30 RX ADMIN — IPRATROPIUM BROMIDE AND ALBUTEROL SULFATE SCH ML: .5; 2.5 SOLUTION RESPIRATORY (INHALATION) at 07:30

## 2020-03-30 RX ADMIN — INSULIN LISPRO SCH UNIT: 100 INJECTION, SOLUTION INTRAVENOUS; SUBCUTANEOUS at 21:00

## 2020-03-30 RX ADMIN — SERTRALINE HYDROCHLORIDE SCH MG: 50 TABLET, FILM COATED ORAL at 22:00

## 2020-03-30 NOTE — NUR
Nutrition Screen Note



RD Recommendation for Physician: 

-Consider 2 gm Na, 1800 calorie carb controlled diet, FR per MD.

 

Plan of Care: RD following, monitoring for tolerance and adequacy. Education provided. 



Nutrition reason for involvement:

(diagnosis- CHF)



Primary Diagnose(s): CHF, COPD



PMH: 

1. Diastolic heart failure.

2. Obstructive sleep apnea.

3. Chronic obstructive pulmonary disease.

4. Hypertension.

5. Diabetes.



Ht: 62  in 

Wt: 292 lb

BMI: 53.4 kg/m2

IBW: 110 lb



RD Assessment:

(3/30) 59 YOF admitted for CHF with PMH listed above. The pt was seen resting in bed, pt is 
on NC. She reported her appetite has been so so. She denied any weight loss and reported 
she has had weight gain d/t fluid. Pt reported she was dealing with some N/V, denied 
C/D/chewing or swallowing issues (except a dry throat). She denied food allergies as well. 
Pt accepted education regarding the low Na diet and DM diet. Pt verbalized understanding and 
had no further questions or concerns.  Pt is on lasix and zofran. LBM: 3/29.  POC GM: 
183-216.  Per EMR, pt has been consuming 100% of her meals so far. Chart reviewed. Labs and 
meds reviewed. Will continue to monitor. 



Current Diet: 1800 calorie carb controlled diet, 1200 ml FR



Malnutrition Evaluation (3/30)

The patient does not meet criteria for a specified degree of malnutrition at this time. Will 
re-evaluate at follow-up as appropriate. 



Diet Education Needs Assessment:

Diet education indicated, pt accepted. 



Diet Adequacy:

(Meeting calorie needs, Meeting protein needs





Learner(s): pt 

Barriers: none 

Cultural/Language Modifications: none 

Readiness: acceptance 

Method: discussion, handout 

Topics: Low Na Diet, DM diet 

Understanding/Compliance: verbalized understanding, anticipate fair compliance 





Nutrition Care Level:  low 





Signed: Yaritza Cantu, RD, LD

## 2020-03-30 NOTE — DIAGNOSTIC IMAGING REPORT
EXAMINATION:  CHEST SINGLE (PORTABLE)    



INDICATION: CHF



COMPARISON: Chest radiograph 3/29/2020

     

FINDINGS:



LINES/TUBES:EKG leads overlie the chest.



LUNGS:The lung volumes are low. Unchanged bibasilar airspace opacities. There

is perihilar fullness and indistinctness of the pulmonary vasculature.



PLEURA:Unchanged small bilateral pleural effusions. No pneumothorax.



MEDIASTINUM:The cardiomediastinal silhouette appears unchanged in size and

shape.



BONES/SOFT TISSUES:No acute osseous injury.



ABDOMEN:No free air under the diaphragm.





IMPRESSION: 

No significant interval change.



Signed by: Harman Ochoa MD on 3/30/2020 12:24 PM

## 2020-03-30 NOTE — CONSULTATION
DATE OF CONSULTATION:  

 

Cardiology Consult 

 

HISTORY OF PRESENT ILLNESS:  Mag Oliveira is a 59-year-old female with primary history

of hypertension, diastolic heart failure, paroxysmal atrial fibrillation (on Eliquis),

obesity, thyroid disease, hyperlipidemia, [facility] who was

just recently admitted for CHF exacerbation, now readmitted complaining of worsening

shortness of breath.  The patient also reports for occasional chest pressure, especially

every time when she experiences shortness of breath.  Chest pressure describes as

squeezing.  The patient also endorsed that her last normal heart catheterization was 20

years ago.  The patient denies any fever or chills. 

 

MEDICAL HISTORY:  Includes as mentioned above, hypertension, diastolic heart failure,

paroxysmal atrial fibrillation, COPD, hyperlipidemia, thyroid disease. 

 

SOCIAL HISTORY:  The patient quit smoking about 5 years ago.  No alcohol use or abuse or

illicit drug use. 

 

ALLERGIES:  THE PATIENT IS ALLERGIC TO PENICILLIN AND SULFA.

 

PHYSICAL EXAMINATION:

VITAL SIGNS:  Blood pressure 144/66, pulse is 89, respiratory rate is 22, 94% on 4 L

nasal cannula. 

GENERAL APPEARANCE:  The patient is well-developed, obese, in no acute distress. 

HEENT:  Head normocephalic, atraumatic.  Eyes, pupils equally round, reactive to light,

and accommodation.  Sclerae are nonicteric.  Ears are normal.  Oral cavity, mucosa is

moist. Throat is clear. 

NECK AND THYROID:  Supple.  Full range of motion.  No cervical lymphadenopathy. 

SKIN:  Warm and dry.  No suspicious lesion. 

HEART:  Regular rate and rhythm.  S1 and S2 are normal.  No murmurs heard. 

LUNGS:  Diminished to lower lobes and fine crackles noted bilaterally. 

ABDOMEN:  Obese, soft, nontender, nondistended.  Bowel sounds are present and normal. 

EXTREMITIES:  No clubbing, no cyanosis, but it is +2 edema bilaterally. 

NEUROLOGIC:  Nonfocal.  Motor strength normal of the upper and lower extremities.

Sensory exam is intact. 

IMPRESSION AND PLAN:  The patient is a 59-year-old, admitted concerning for

acute-on-chronic diastolic heart failure exacerbation.  Admitting BNP is 447, normal

sinus rhythm on the EKG with PVCs. 

1. Telemetry monitoring.  Monitor hemodynamics.

2. Diurese with IV Lasix and monitor intake and output and replace electrolytes as

needed. 

3. Repeat echocardiogram.

4. Cardiac diet, daily weights, fluid restriction, check lipid panel, and thyroid panel.

5. Continue medical management for chronic obstructive pulmonary disease exacerbation. 

Further recommendation will follow according to the patient's clinical course. 

 

Thank you for this consultation.

 

 

Dictated by Helen Remy NP

 

______________________________

MD BO Jarvis/CESILIA

D:  03/30/2020 09:21:39

T:  03/30/2020 10:49:39

Job #:  [id number]

## 2020-03-30 NOTE — DIAGNOSTIC IMAGING REPORT
EXAM: Right Upper Quadrant Abdominal Ultrasound

INDICATION:        

^nausea, vomiting, r/o stones

^20200330

^1349

COMPARISON: None. 

TECHNIQUE: Transverse and longitudinal images of the upper abdomen were

obtained. The technologist reports a technically limited study due to patient

body habitus.



FINDINGS:     

Liver:

     Size: 16.8 cm in the right midclavicular line, mildly enlarged

     Appearance: Increased echogenicity, smooth contour

     Mass: No focal masses



Gallbladder:

     Stones/Sludge: Multiple echogenic gallstones and sludge

     Wall: 0.3 cm, upper limit of normal

     Appearance: Contracted. No pericholecystic fluid or hydrops.  

     Sonographic Tsang's Sign: Negative



Bile Ducts:

     Intrahepatic Ducts: No dilatation

     Extrahepatic Ducts: Common bile duct measures 0.3 cm, no dilatation



Pancreas:

     Not identified due to overlying bowel gas and patient body habitus



Right Kidney:

     Size:  10.3 x 5.5 x 5.5 cm

     Echogenicity:  Normal

     Parenchymal thickness: Normal 

     Collecting System:  No hydronephrosis

     Stone:  None

     Cyst/Mass: None

             

Vessels:

Aorta and IVC are poorly visualized due to overlying bowel gas and patient body

habitus



Free Fluid:

     No ascites or pleural effusion



IMPRESSION:



1. Mild hepatomegaly with fatty infiltration of the liver.



2. Cholelithiasis without other sonographic evidence of acute cholecystitis.



Signed by: Bean Martinez MD on 3/30/2020 2:51 PM

## 2020-03-30 NOTE — DISCHARGE SUMMARY
ADMISSION DIAGNOSES:  

1. Acute on chronic diastolic congestive heart failure with pulmonary edema.

2. Acute exacerbation of chronic obstructive pulmonary disease with oxygen dependent.

3. Acute kidney injury and chronic kidney disease 3.

4. Type 2 diabetes with chronic kidney disease 3.

5. Hypertension with chronic kidney disease 3 and chronic diastolic congestive heart

failure. 

6. Migraine headaches.

7. Chronic PAF.

8. Obstructive sleep apnea.

9. Anemia.

10. Hyperlipidemia.

11. Gastroesophageal reflux disease.

12. Super morbid obesity with a BMI of 53.1.

 

DISCHARGE DIAGNOSES:  

1. Acute on chronic diastolic congestive heart failure with pulmonary edema.

2. Acute exacerbation of chronic obstructive pulmonary disease with oxygen dependent.

3. Acute kidney injury and chronic kidney disease 3.

4. Type 2 diabetes with chronic kidney disease 3.

5. Hypertension with chronic kidney disease 3 and chronic diastolic congestive heart

failure. 

6. Migraine headaches.

7. Chronic PAF.

8. Obstructive sleep apnea.

9. Anemia.

10. Hyperlipidemia.

11. Gastroesophageal reflux disease.

12. Super morbid obesity with a BMI of 53.1.

13. Rule out bilateral lower extremity DVT, rule out PE, rule out bacteremia, rule out

flu, and rule out GI bleed. 

 

HISTORY:  Hypertension, type 2 diabetes, COPD, asthma, GERD, depression, hyperlipidemia,

CKD-3, PAF, MARIO, chronic diastolic CHF, pulmonary hypertension, migraine, back pain,

mild PVD, right lower extremity DVT, BiPAP and oxygen use at home. 

 

SURGICAL HISTORY:  Hysterectomy, T and A, ovarian cyst removal, left arm, left chest

surgery, three d and C, cholecystectomy, tubal ligation, cyst removed under left eye. 

 

FAMILY HISTORY:  The patient's mom and grandmother, sister, and grandfather had cancer.

The patient's grandmother had a stroke.  The patient's mom had diabetes. 

 

SOCIAL HISTORY:  The patient admits to occasional alcohol use and tobacco use, but she

quit smoking. 

 

HOSPITAL COURSE:  A 59-year-old female, who presents with complaints of shortness of

breath and bilateral lower extremity swelling for one week.  She is oxygen dependant at

home with 4 L/minute.  She was recently at the [facility] and was unable to

get nebulizer treatments for 3 weeks per her report.  On admission, the patient was

started on Lasix, which was then changed to Bumex.  Pulmonology was consulted.  She was

started on meds, steroids, theophylline and doxycycline.  Bilateral lower extremity

venous Doppler was negative.  Echo showed EF of 55%.  V/Q scan was negative for PE.

Chest x-ray showed cardiomegaly and pulmonary edema on admission.  Followup chest x-ray

showed mild cardiomegaly and pulmonary edema with small bilateral pleural effusion.

After few days of the Bumex, the swelling is improving, although not back to baseline.

She normally uses 4 L was currently on 5 L oxygen.  Blood cultures were negative.

Sputum cultures were negative.  Flu swab was negative.  Stool for blood was negative.

Physical therapy recommended skilled nursing placement, so the patient will be

discharged to [facility].  She will continue doxycycline for five more days and Bumex

to try to pull the fluid off.  The patient understands discharge instructions and agrees

to plan.  Vital signs stable.  The patient is afebrile. 

 

 

Dictated by Catrina Martinez NP

 

______________________________

MD ROBBIE Zamudio/MODL

D:  03/27/2020 19:30:19

T:  03/27/2020 20:08:33

Job #:  [id number]

## 2020-03-31 VITALS — DIASTOLIC BLOOD PRESSURE: 66 MMHG | SYSTOLIC BLOOD PRESSURE: 130 MMHG

## 2020-03-31 VITALS — SYSTOLIC BLOOD PRESSURE: 140 MMHG | DIASTOLIC BLOOD PRESSURE: 70 MMHG

## 2020-03-31 VITALS — SYSTOLIC BLOOD PRESSURE: 130 MMHG | DIASTOLIC BLOOD PRESSURE: 66 MMHG

## 2020-03-31 VITALS — DIASTOLIC BLOOD PRESSURE: 61 MMHG | SYSTOLIC BLOOD PRESSURE: 121 MMHG

## 2020-03-31 VITALS — DIASTOLIC BLOOD PRESSURE: 67 MMHG | SYSTOLIC BLOOD PRESSURE: 124 MMHG

## 2020-03-31 VITALS — DIASTOLIC BLOOD PRESSURE: 68 MMHG | SYSTOLIC BLOOD PRESSURE: 143 MMHG

## 2020-03-31 VITALS — DIASTOLIC BLOOD PRESSURE: 70 MMHG | SYSTOLIC BLOOD PRESSURE: 140 MMHG

## 2020-03-31 VITALS — SYSTOLIC BLOOD PRESSURE: 115 MMHG | DIASTOLIC BLOOD PRESSURE: 73 MMHG

## 2020-03-31 LAB
ALBUMIN SERPL-MCNC: 3.2 G/DL (ref 3.5–5)
ALBUMIN/GLOB SERPL: 0.9 {RATIO} (ref 0.8–2)
ALP SERPL-CCNC: 82 IU/L (ref 40–150)
ALT SERPL-CCNC: 9 IU/L (ref 0–55)
ANION GAP SERPL CALC-SCNC: 11.8 MMOL/L (ref 8–16)
BASOPHILS # BLD AUTO: 0 10*3/UL (ref 0–0.1)
BASOPHILS NFR BLD AUTO: 0.3 % (ref 0–1)
BUN SERPL-MCNC: 51 MG/DL (ref 7–26)
BUN/CREAT SERPL: 29 (ref 6–25)
CALCIUM SERPL-MCNC: 9.1 MG/DL (ref 8.4–10.2)
CHLORIDE SERPL-SCNC: 99 MMOL/L (ref 98–107)
CO2 SERPL-SCNC: 40 MMOL/L (ref 22–29)
DEPRECATED NEUTROPHILS # BLD AUTO: 7.2 10*3/UL (ref 2.1–6.9)
EGFRCR SERPLBLD CKD-EPI 2021: 29 ML/MIN (ref 60–?)
EOSINOPHIL # BLD AUTO: 0.2 10*3/UL (ref 0–0.4)
EOSINOPHIL NFR BLD AUTO: 1.3 % (ref 0–6)
ERYTHROCYTE [DISTWIDTH] IN CORD BLOOD: 14.7 % (ref 11.7–14.4)
GLOBULIN PLAS-MCNC: 3.4 G/DL (ref 2.3–3.5)
GLUCOSE SERPLBLD-MCNC: 82 MG/DL (ref 74–118)
HCT VFR BLD AUTO: 33.4 % (ref 34.2–44.1)
HGB BLD-MCNC: 9.8 G/DL (ref 12–16)
LIPASE SERPL-CCNC: 36 U/L (ref 8–78)
LYMPHOCYTES # BLD: 3.3 10*3/UL (ref 1–3.2)
LYMPHOCYTES NFR BLD AUTO: 27.7 % (ref 18–39.1)
MAGNESIUM SERPL-MCNC: 1.9 MG/DL (ref 1.3–2.1)
MCH RBC QN AUTO: 29 PG (ref 28–32)
MCHC RBC AUTO-ENTMCNC: 29.3 G/DL (ref 31–35)
MCV RBC AUTO: 98.8 FL (ref 81–99)
MONOCYTES # BLD AUTO: 1.1 10*3/UL (ref 0.2–0.8)
MONOCYTES NFR BLD AUTO: 9.3 % (ref 4.4–11.3)
NEUTS SEG NFR BLD AUTO: 60.8 % (ref 38.7–80)
PLATELET # BLD AUTO: 238 X10E3/UL (ref 140–360)
POTASSIUM SERPL-SCNC: 3.8 MMOL/L (ref 3.5–5.1)
RBC # BLD AUTO: 3.38 X10E6/UL (ref 3.6–5.1)
SODIUM SERPL-SCNC: 147 MMOL/L (ref 136–145)

## 2020-03-31 RX ADMIN — IPRATROPIUM BROMIDE AND ALBUTEROL SULFATE SCH ML: .5; 2.5 SOLUTION RESPIRATORY (INHALATION) at 11:26

## 2020-03-31 RX ADMIN — INSULIN LISPRO SCH UNIT: 100 INJECTION, SOLUTION INTRAVENOUS; SUBCUTANEOUS at 21:00

## 2020-03-31 RX ADMIN — IPRATROPIUM BROMIDE AND ALBUTEROL SULFATE SCH ML: .5; 2.5 SOLUTION RESPIRATORY (INHALATION) at 07:30

## 2020-03-31 RX ADMIN — INSULIN LISPRO SCH UNIT: 100 INJECTION, SOLUTION INTRAVENOUS; SUBCUTANEOUS at 07:30

## 2020-03-31 RX ADMIN — METOPROLOL TARTRATE SCH MG: 25 TABLET, FILM COATED ORAL at 08:20

## 2020-03-31 RX ADMIN — SERTRALINE HYDROCHLORIDE SCH MG: 50 TABLET, FILM COATED ORAL at 21:00

## 2020-03-31 RX ADMIN — FLUTICASONE PROPIONATE AND SALMETEROL SCH EA: 50; 250 POWDER RESPIRATORY (INHALATION) at 06:11

## 2020-03-31 RX ADMIN — IPRATROPIUM BROMIDE AND ALBUTEROL SULFATE SCH ML: .5; 2.5 SOLUTION RESPIRATORY (INHALATION) at 03:55

## 2020-03-31 RX ADMIN — INSULIN LISPRO SCH UNIT: 100 INJECTION, SOLUTION INTRAVENOUS; SUBCUTANEOUS at 16:35

## 2020-03-31 RX ADMIN — PANTOPRAZOLE SODIUM SCH MG: 40 TABLET, DELAYED RELEASE ORAL at 08:20

## 2020-03-31 RX ADMIN — FUROSEMIDE SCH MG: 10 INJECTION, SOLUTION INTRAMUSCULAR; INTRAVENOUS at 21:00

## 2020-03-31 RX ADMIN — EZETIMIBE SCH MG: 10 TABLET ORAL at 08:20

## 2020-03-31 RX ADMIN — METOCLOPRAMIDE SCH MG: 10 TABLET ORAL at 11:30

## 2020-03-31 RX ADMIN — ESTROGENS, CONJUGATED SCH MG: 0.62 TABLET, FILM COATED ORAL at 08:20

## 2020-03-31 RX ADMIN — SODIUM CHLORIDE PRN MG: 900 INJECTION INTRAVENOUS at 08:50

## 2020-03-31 RX ADMIN — INSULIN GLARGINE SCH UNITS: 100 INJECTION, SOLUTION SUBCUTANEOUS at 09:00

## 2020-03-31 RX ADMIN — IPRATROPIUM BROMIDE AND ALBUTEROL SULFATE SCH ML: .5; 2.5 SOLUTION RESPIRATORY (INHALATION) at 15:00

## 2020-03-31 RX ADMIN — FUROSEMIDE SCH MG: 10 INJECTION, SOLUTION INTRAMUSCULAR; INTRAVENOUS at 08:20

## 2020-03-31 RX ADMIN — APIXABAN SCH MG: 5 TABLET, FILM COATED ORAL at 16:35

## 2020-03-31 RX ADMIN — APIXABAN SCH MG: 5 TABLET, FILM COATED ORAL at 08:20

## 2020-03-31 RX ADMIN — TIOTROPIUM BROMIDE SCH MCG: 18 CAPSULE ORAL; RESPIRATORY (INHALATION) at 06:11

## 2020-03-31 RX ADMIN — METOCLOPRAMIDE SCH MG: 10 TABLET ORAL at 08:20

## 2020-03-31 RX ADMIN — SODIUM CHLORIDE SCH MLS/HR: 900 INJECTION, SOLUTION INTRAVENOUS at 15:45

## 2020-03-31 RX ADMIN — INSULIN LISPRO SCH UNIT: 100 INJECTION, SOLUTION INTRAVENOUS; SUBCUTANEOUS at 11:30

## 2020-03-31 RX ADMIN — HYDROCODONE BITARTRATE AND ACETAMINOPHEN PRN EA: 10; 325 TABLET ORAL at 18:04

## 2020-03-31 RX ADMIN — METOCLOPRAMIDE SCH MG: 10 TABLET ORAL at 21:00

## 2020-03-31 RX ADMIN — IPRATROPIUM BROMIDE AND ALBUTEROL SULFATE SCH ML: .5; 2.5 SOLUTION RESPIRATORY (INHALATION) at 20:55

## 2020-03-31 RX ADMIN — Medication SCH MG: at 21:00

## 2020-03-31 RX ADMIN — INSULIN GLARGINE SCH UNITS: 100 INJECTION, SOLUTION SUBCUTANEOUS at 21:00

## 2020-03-31 RX ADMIN — METOCLOPRAMIDE SCH MG: 10 TABLET ORAL at 16:35

## 2020-03-31 NOTE — DIAGNOSTIC IMAGING REPORT
EXAM: CT Chest WITHOUT intravenous contrast 3/31/2020 10:45 AM

INDICATION:  Pneumonia

COMPARISON: Chest radiograph 3/30/2020

TECHNIQUE:

Chest was scanned utilizing a multidetector helical scanner from the lung apex

through the level of the adrenal glands without administration of IV contrast.

Coronal and sagittal reformations were obtained. Routine protocol was

performed.



IV CONTRAST: None

RADIATION DOSE: Total DLP: 911 mGy*cm. Dose modulation, iterative

reconstruction, and/or weight based adjustment of the mA/kV was utilized to

reduce the radiation dose to as low as reasonably achievable. 

COMPLICATIONS: None



FINDINGS:



LINES/ TUBES: None.



LUNGS AND AIRWAYS:  The central airways are patent. Dependent right lower lobe

segmental atelectasis. Left lower lobe dependent subsegmental atelectasis. No

focal consolidation mild lower lobe predominant intralobular septal thickening

consistent with mild pulmonary and her social edema. No suspicious pulmonary

nodule.  



PLEURA: The pleural spaces are clear.



HEART AND MEDIASTINUM: Bilateral hypodense thyroid nodules. The largest nodule

measures up to 2.1 cm on the left.  No supraclavicular, axillary, mediastinal,

or hilar lymphadenopathy.  Multichamber cardiomegaly. Small pericardial

effusion. Athetotic calcifications involve the coronary arteries, thoracic

aorta, and proximal great vessels. The main pulmonary artery is enlarged,

measuring up to 3.9 cm maximum diameter.



UPPER ABDOMEN: No acute findings.



BONES: The visualized bony thorax is within normal limits.



SOFT TISSUES: Unremarkable.



IMPRESSION: 

Cardiomegaly and mild pulmonary interstitial edema.



Bilateral dependent lower lobe subsegmental atelectasis. 



Enlargement of the main pulmonary artery can be seen with pulmonary arterial

hypertension.

 



Signed by: Harman Ochoa MD on 3/31/2020 12:35 PM

## 2020-03-31 NOTE — CONSULTATION
DATE OF CONSULTATION:  03/31/2020  

 

CHIEF COMPLAINT:  Nausea.

 

HISTORY OF PRESENT ILLNESS:  Thin 59-year-old female, admitted for shortness of breath

secondary to exacerbation of COPD.  The patient is complaining of nausea.  During

hospitalization was found to have gallstone.  No vomiting.  No fever or chills. 

 

PAST MEDICAL HISTORY:  As mentioned positive for COPD with metabolic syndrome of

hypertension, diabetes, hyperlipidemia.  She also has heart failure with atrial

fibrillation, morbid obesity, and sleep apnea. 

 

SOCIAL HABITS:  The patient denied current smoking or alcohol use.

 

REVIEW OF SYSTEMS:

Mild shortness of breath.  No chest pain.  No fever.

 

ALLERGIES:  SHE ALLERGIC TO PENICILLIN AND SULFA.

 

PHYSICAL EXAMINATION:

VITAL SIGNS:  Stable.  She is afebrile. 

GENERAL:  She is awake and alert, in no apparent distress. 

HEENT:  Sclerae anicteric. 

NECK:  Supple. 

LUNGS:  Clear.  No wheezes. 

HEART:  Regular rate and rhythm. 

ABDOMEN:  Soft.  Minimal guarding in the epigastrium.  No rebound tenderness. 

EXTREMITIES:  No ankle edema.

LABORATORY DATA:  White cell count is 12, hemoglobin of 9.8, and platelet count 238.

Creatinine of 1.7.  Liver function tests unremarkable.  Lipase 36.  Ultrasound show

gallstones with no wall thickening. 

 

ASSESSMENT:  Gallstone with nausea.

 

PLAN:  HIDA scan to rule out cystic duct obstruction.  We will follow the patient with

you. 

 

 

 

 

______________________________

MD DONNA Fregoso/YASIRL

D:  03/31/2020 09:20:21

T:  03/31/2020 09:51:44

Job #:  [id number]

## 2020-03-31 NOTE — DIAGNOSTIC IMAGING REPORT
Hepatobiliary Scan with Gallbladder Ejection Fraction



Clinical information: RUQ pain



Report: Following intravenous administration of 6.6 millicuries of Tc-99m

mebrofenin, dynamic images of the abdomen in the anterior projection were

obtained through 46 minutes.  Sincalide (CCK analog) 2.7 micrograms was

administered intravenously over 30 minutes with additional imaging for

determination of gallbladder ejection fraction.



Perfusion to the liver is normal.  Extraction of tracer from the blood pool by

the liver parenchyma is normal.  Tracer is seen promptly within the biliary

tract.  The gallbladder begins to fill by 20 minutes post-injection of tracer. 

Tracer is seen in the small bowel by 14 minutes.  The gallbladder ejection

fraction with administration of sincalide is 96% (normal greater than 40%).



Impression: 



1.  Filling of the gallbladder excludes the diagnosis of acute cystic duct

obstruction/acute cholecystitis.

2.  Normal gallbladder ejection fraction of 96% does not support the clinical

diagnosis of chronic cholecystitis/gallbladder dyskinesia.



Signed by: Dr. Rica Hernandez M.D. on 3/31/2020 4:52 PM

## 2020-04-01 VITALS — DIASTOLIC BLOOD PRESSURE: 59 MMHG | SYSTOLIC BLOOD PRESSURE: 125 MMHG

## 2020-04-01 VITALS — SYSTOLIC BLOOD PRESSURE: 122 MMHG | DIASTOLIC BLOOD PRESSURE: 49 MMHG

## 2020-04-01 VITALS — SYSTOLIC BLOOD PRESSURE: 125 MMHG | DIASTOLIC BLOOD PRESSURE: 59 MMHG

## 2020-04-01 VITALS — SYSTOLIC BLOOD PRESSURE: 129 MMHG | DIASTOLIC BLOOD PRESSURE: 67 MMHG

## 2020-04-01 VITALS — SYSTOLIC BLOOD PRESSURE: 136 MMHG | DIASTOLIC BLOOD PRESSURE: 77 MMHG

## 2020-04-01 VITALS — DIASTOLIC BLOOD PRESSURE: 53 MMHG | SYSTOLIC BLOOD PRESSURE: 127 MMHG

## 2020-04-01 VITALS — SYSTOLIC BLOOD PRESSURE: 127 MMHG | DIASTOLIC BLOOD PRESSURE: 53 MMHG

## 2020-04-01 RX ADMIN — FUROSEMIDE SCH MG: 10 INJECTION, SOLUTION INTRAMUSCULAR; INTRAVENOUS at 08:38

## 2020-04-01 RX ADMIN — HYDROCODONE BITARTRATE AND ACETAMINOPHEN PRN EA: 10; 325 TABLET ORAL at 21:22

## 2020-04-01 RX ADMIN — SODIUM CHLORIDE SCH MLS/HR: 900 INJECTION, SOLUTION INTRAVENOUS at 14:39

## 2020-04-01 RX ADMIN — INSULIN LISPRO SCH UNIT: 100 INJECTION, SOLUTION INTRAVENOUS; SUBCUTANEOUS at 11:30

## 2020-04-01 RX ADMIN — EZETIMIBE SCH MG: 10 TABLET ORAL at 08:38

## 2020-04-01 RX ADMIN — METOCLOPRAMIDE SCH MG: 10 TABLET ORAL at 12:02

## 2020-04-01 RX ADMIN — IPRATROPIUM BROMIDE AND ALBUTEROL SULFATE SCH ML: .5; 2.5 SOLUTION RESPIRATORY (INHALATION) at 15:40

## 2020-04-01 RX ADMIN — METOCLOPRAMIDE SCH MG: 10 TABLET ORAL at 21:15

## 2020-04-01 RX ADMIN — INSULIN LISPRO SCH UNIT: 100 INJECTION, SOLUTION INTRAVENOUS; SUBCUTANEOUS at 16:59

## 2020-04-01 RX ADMIN — IPRATROPIUM BROMIDE AND ALBUTEROL SULFATE SCH ML: .5; 2.5 SOLUTION RESPIRATORY (INHALATION) at 08:40

## 2020-04-01 RX ADMIN — INSULIN LISPRO SCH UNIT: 100 INJECTION, SOLUTION INTRAVENOUS; SUBCUTANEOUS at 07:30

## 2020-04-01 RX ADMIN — IPRATROPIUM BROMIDE AND ALBUTEROL SULFATE SCH ML: .5; 2.5 SOLUTION RESPIRATORY (INHALATION) at 04:20

## 2020-04-01 RX ADMIN — IPRATROPIUM BROMIDE AND ALBUTEROL SULFATE SCH ML: .5; 2.5 SOLUTION RESPIRATORY (INHALATION) at 11:40

## 2020-04-01 RX ADMIN — APIXABAN SCH MG: 5 TABLET, FILM COATED ORAL at 16:58

## 2020-04-01 RX ADMIN — Medication SCH MG: at 21:15

## 2020-04-01 RX ADMIN — PANTOPRAZOLE SODIUM SCH MG: 40 TABLET, DELAYED RELEASE ORAL at 08:38

## 2020-04-01 RX ADMIN — ESTROGENS, CONJUGATED SCH MG: 0.62 TABLET, FILM COATED ORAL at 08:38

## 2020-04-01 RX ADMIN — FUROSEMIDE SCH MG: 10 INJECTION, SOLUTION INTRAMUSCULAR; INTRAVENOUS at 21:15

## 2020-04-01 RX ADMIN — INSULIN LISPRO SCH UNIT: 100 INJECTION, SOLUTION INTRAVENOUS; SUBCUTANEOUS at 20:29

## 2020-04-01 RX ADMIN — APIXABAN SCH MG: 5 TABLET, FILM COATED ORAL at 08:38

## 2020-04-01 RX ADMIN — IPRATROPIUM BROMIDE AND ALBUTEROL SULFATE SCH ML: .5; 2.5 SOLUTION RESPIRATORY (INHALATION) at 22:05

## 2020-04-01 RX ADMIN — IPRATROPIUM BROMIDE AND ALBUTEROL SULFATE SCH ML: .5; 2.5 SOLUTION RESPIRATORY (INHALATION) at 19:00

## 2020-04-01 RX ADMIN — INSULIN GLARGINE SCH UNITS: 100 INJECTION, SOLUTION SUBCUTANEOUS at 20:41

## 2020-04-01 RX ADMIN — SERTRALINE HYDROCHLORIDE SCH MG: 50 TABLET, FILM COATED ORAL at 21:15

## 2020-04-01 RX ADMIN — METOPROLOL TARTRATE SCH MG: 25 TABLET, FILM COATED ORAL at 08:38

## 2020-04-01 RX ADMIN — IPRATROPIUM BROMIDE AND ALBUTEROL SULFATE SCH ML: .5; 2.5 SOLUTION RESPIRATORY (INHALATION) at 00:15

## 2020-04-01 RX ADMIN — TIOTROPIUM BROMIDE SCH MCG: 18 CAPSULE ORAL; RESPIRATORY (INHALATION) at 06:30

## 2020-04-01 RX ADMIN — FLUTICASONE PROPIONATE AND SALMETEROL SCH EA: 50; 250 POWDER RESPIRATORY (INHALATION) at 06:30

## 2020-04-01 RX ADMIN — METOCLOPRAMIDE SCH MG: 10 TABLET ORAL at 08:38

## 2020-04-01 RX ADMIN — INSULIN GLARGINE SCH UNITS: 100 INJECTION, SOLUTION SUBCUTANEOUS at 08:38

## 2020-04-01 RX ADMIN — METOCLOPRAMIDE SCH MG: 10 TABLET ORAL at 16:58

## 2020-04-02 VITALS — DIASTOLIC BLOOD PRESSURE: 68 MMHG | SYSTOLIC BLOOD PRESSURE: 147 MMHG

## 2020-04-02 VITALS — SYSTOLIC BLOOD PRESSURE: 147 MMHG | DIASTOLIC BLOOD PRESSURE: 68 MMHG

## 2020-04-02 VITALS — SYSTOLIC BLOOD PRESSURE: 136 MMHG | DIASTOLIC BLOOD PRESSURE: 74 MMHG

## 2020-04-02 VITALS — DIASTOLIC BLOOD PRESSURE: 51 MMHG | SYSTOLIC BLOOD PRESSURE: 124 MMHG

## 2020-04-02 LAB
ANION GAP SERPL CALC-SCNC: 10.9 MMOL/L (ref 8–16)
BASOPHILS # BLD AUTO: 0 10*3/UL (ref 0–0.1)
BASOPHILS NFR BLD AUTO: 0.3 % (ref 0–1)
BUN SERPL-MCNC: 41 MG/DL (ref 7–26)
BUN/CREAT SERPL: 24 (ref 6–25)
CALCIUM SERPL-MCNC: 8.6 MG/DL (ref 8.4–10.2)
CHLORIDE SERPL-SCNC: 98 MMOL/L (ref 98–107)
CO2 SERPL-SCNC: 37 MMOL/L (ref 22–29)
DEPRECATED NEUTROPHILS # BLD AUTO: 6.1 10*3/UL (ref 2.1–6.9)
DEPRECATED PHOSPHATE SERPL-MCNC: 3.2 MG/DL (ref 2.3–4.7)
EGFRCR SERPLBLD CKD-EPI 2021: 30 ML/MIN (ref 60–?)
EOSINOPHIL # BLD AUTO: 0.4 10*3/UL (ref 0–0.4)
EOSINOPHIL NFR BLD AUTO: 4 % (ref 0–6)
ERYTHROCYTE [DISTWIDTH] IN CORD BLOOD: 14.5 % (ref 11.7–14.4)
GLUCOSE SERPLBLD-MCNC: 167 MG/DL (ref 74–118)
HCT VFR BLD AUTO: 30.3 % (ref 34.2–44.1)
HGB BLD-MCNC: 9.2 G/DL (ref 12–16)
LYMPHOCYTES # BLD: 2.2 10*3/UL (ref 1–3.2)
LYMPHOCYTES NFR BLD AUTO: 23.7 % (ref 18–39.1)
MAGNESIUM SERPL-MCNC: 1.8 MG/DL (ref 1.3–2.1)
MCH RBC QN AUTO: 29.6 PG (ref 28–32)
MCHC RBC AUTO-ENTMCNC: 30.4 G/DL (ref 31–35)
MCV RBC AUTO: 97.4 FL (ref 81–99)
MONOCYTES # BLD AUTO: 0.7 10*3/UL (ref 0.2–0.8)
MONOCYTES NFR BLD AUTO: 7 % (ref 4.4–11.3)
NEUTS SEG NFR BLD AUTO: 64.7 % (ref 38.7–80)
PLATELET # BLD AUTO: 178 X10E3/UL (ref 140–360)
POTASSIUM SERPL-SCNC: 3.9 MMOL/L (ref 3.5–5.1)
RBC # BLD AUTO: 3.11 X10E6/UL (ref 3.6–5.1)
SODIUM SERPL-SCNC: 142 MMOL/L (ref 136–145)

## 2020-04-02 RX ADMIN — METOPROLOL TARTRATE SCH MG: 25 TABLET, FILM COATED ORAL at 08:54

## 2020-04-02 RX ADMIN — FUROSEMIDE SCH MG: 10 INJECTION, SOLUTION INTRAMUSCULAR; INTRAVENOUS at 08:54

## 2020-04-02 RX ADMIN — METOCLOPRAMIDE SCH MG: 10 TABLET ORAL at 11:30

## 2020-04-02 RX ADMIN — IPRATROPIUM BROMIDE AND ALBUTEROL SULFATE SCH ML: .5; 2.5 SOLUTION RESPIRATORY (INHALATION) at 08:57

## 2020-04-02 RX ADMIN — APIXABAN SCH MG: 5 TABLET, FILM COATED ORAL at 08:54

## 2020-04-02 RX ADMIN — FLUTICASONE PROPIONATE AND SALMETEROL SCH EA: 50; 250 POWDER RESPIRATORY (INHALATION) at 09:30

## 2020-04-02 RX ADMIN — PANTOPRAZOLE SODIUM SCH MG: 40 TABLET, DELAYED RELEASE ORAL at 08:54

## 2020-04-02 RX ADMIN — INSULIN GLARGINE SCH UNITS: 100 INJECTION, SOLUTION SUBCUTANEOUS at 08:54

## 2020-04-02 RX ADMIN — EZETIMIBE SCH MG: 10 TABLET ORAL at 08:54

## 2020-04-02 RX ADMIN — METOCLOPRAMIDE SCH MG: 10 TABLET ORAL at 08:54

## 2020-04-02 RX ADMIN — ESTROGENS, CONJUGATED SCH MG: 0.62 TABLET, FILM COATED ORAL at 08:54

## 2020-04-02 RX ADMIN — TIOTROPIUM BROMIDE SCH MCG: 18 CAPSULE ORAL; RESPIRATORY (INHALATION) at 09:30

## 2020-04-02 RX ADMIN — IPRATROPIUM BROMIDE AND ALBUTEROL SULFATE SCH ML: .5; 2.5 SOLUTION RESPIRATORY (INHALATION) at 03:55

## 2020-04-02 RX ADMIN — INSULIN LISPRO SCH UNIT: 100 INJECTION, SOLUTION INTRAVENOUS; SUBCUTANEOUS at 08:54

## 2020-04-02 NOTE — DISCHARGE SUMMARY
CHIEF COMPLAINT:  Shortness of breath.

 

PERTINENT HISTORY AND PHYSICAL FINDINGS:  Ms. Oliveira is a 59-year-old female, who

admitted with complaints of shortness of breath from [facility].  She had

been discharged to the skilled nursing facility on the Friday prior to admission here

and was unable to get her medications and breathing treatments, so she said she "freaked

out" and began having trouble breathing. 

 

PAST MEDICAL HISTORY:  Includes COPD, type 2 diabetes mellitus, chronic kidney disease

stage 3, paroxysmal atrial fibrillation, obstructive sleep apnea, hypertension, chronic

diastolic congestive heart failure, home oxygen use at 4 L/minute, oxygen dependent and

noninvasive ventilator use, and CPAP use at night. 

 

PAST SURGICAL HISTORY:  Includes ovarian cyst removal, tonsillectomy, D and C x3,

hysterectomy. 

 

FAMILY HISTORY:  Mother had diabetes mellitus as well as cancer.  Grandfather and

grandmother and sister also had cancer.  Her grandmother had a cerebrovascular accident. 

 

SOCIAL HISTORY:  Noncontributory.

 

ALLERGIES:  RIVAROXABAN, NAPROXEN, SULFA, PENICILLIN.

 

ADMITTING DIAGNOSES:  

1. Acute-on-chronic respiratory failure secondary to congestive heart failure.

2. Acute-on-chronic diastolic congestive heart failure.

3. Chronic obstructive pulmonary disease.

4. Type 2 diabetes mellitus with chronic kidney disease stage 3.

5. Chronic kidney disease stage 3.

6. Hypertension with chronic kidney disease stage 3 and chronic diastolic congestive

heart failure. 

7. Paroxysmal atrial fibrillation.

 

DISCHARGE DIAGNOSES:  

1. Acute-on-chronic respiratory failure due to congestive heart failure.

2. Acute-on-chronic diastolic congestive heart failure.

3. Chronic obstructive pulmonary disease without exacerbation.

4. Controlled type 2 diabetes mellitus with chronic kidney disease stage 3.

5. Controlled hypertension with chronic kidney disease stage 3 and diastolic congestive

heart failure. 

6. Chronic kidney disease stage 3.

7. Acute nausea and vomiting.

8. Paroxysmal atrial fibrillation.

 

HOSPITAL COURSE:  During her stay, the patient received azithromycin antibiotic and

DuoNeb breathing treatments.  She gradually improved.  She was on fluid restriction,

Lasix 40 mg every 12 hours.  Her home dose of oxygen continued at 4 L/minute via nasal

cannula.  Chest x-ray results were monitored.  She remained on Spiriva and Advair.

Regarding the nausea and vomiting, she was placed on Reglan and will go home with a

prescription for metoclopramide 10 mg before meals and at bedtime.  I will send her home

with DuoNeb prescription as well to ensure she does not run out.  Continue Eliquis for

paroxysmal atrial fibrillation.  Consults included Dr. Lupillo Muhammad with Pulmonology, Dr. Lupillo Stinson with Surgery, Dr. Lucas Roy with Pulmonology, and Dr. Lupillo Roy with

Cardiology.  On admission; WBC was 9.91, hemoglobin 8.5, hematocrit 28.3, and platelets

223.  ABG showed pH 7.29, pCO2 of 70, PO2 __________, HCO3 of 34, oxygen saturation 97%.

 Base excess of 7, and FiO2 of 32%.  PT 14.6, INR 1.07, PTT 33.7.  Sodium 143, potassium

4.1, chloride 102, CO2 of 34, BUN 46, creatinine 1.62, estimated GFR 33, glucose 168,

calcium 9.1.  LFTs were within normal limits.  B-type natriuretic peptide was 447.2.

Chest x-ray on March 28th showed worsening bilateral pulmonary edema.  She had abdominal

ultrasound on March 30th, which showed mild hepatomegaly with fatty infiltration of the

liver.  Cholelithiasis without other sonographic evidence of acute cholecystitis.  She

subsequently underwent a HIDA scan on March 31st, which showed filling of the

gallbladder, excludes the diagnosis of acute cystic duct obstruction/acute

cholecystitis.  Normal gallbladder with ejection fraction of 96%, did not support the

clinical diagnosis of chronic cholecystitis/gallbladder dyskinesia.  She had a CT of the

chest on March 31st, which according to the interpreting radiologist showed cardiomegaly

with mild pulmonary interstitial edema, bilateral dependent lower lobe subsegmental

atelectasis, enlargement of the main pulmonary artery with pulmonary arterial

hypertension.  Cardiac enzymes were within normal limits.  Her lipase was 13.  On the

day of discharge; sodium 142, potassium 3.9, chloride 98, CO2 of 37, BUN 41, creatinine

1.74, GFR 30, glucose 167, fingerstick blood glucose level 147, calcium 8.6, phosphorus

3.2, magnesium 1.8.  WBC 9.45, hemoglobin 9.2, hematocrit 30.3, and platelets 178.  Per

telemetry, normal sinus rhythm with a heart rate of 77.  Maximum temperature was 99.0,

heart rate 76, blood pressure 147/68, respirations 17, oxygen saturation 100% on 4 L of

oxygen via nasal cannula.  Generally clear to auscultation with some diminished bases.

Otherwise, physical exam unremarkable.  Denies any current chills.  She does have

occasional dizziness.  No nausea or vomiting today.  The patient will discharge home.

Continue 

ADA diet.  Activity level as tolerated.  Follow up with PCP, Dr. Valladares, in 1-2 weeks.

Follow up with consults as directed. 

 

 

Dictated by Dandre Álvarez NP

 

______________________________

MD YASEMIN ZamudioP/MODL

D:  04/02/2020 08:38:17

T:  04/02/2020 09:08:22

Job #:  [id number]

## 2020-05-04 ENCOUNTER — HOSPITAL ENCOUNTER (INPATIENT)
Dept: HOSPITAL 88 - ER | Age: 60
LOS: 2 days | Discharge: HOME | DRG: 291 | End: 2020-05-06
Attending: INTERNAL MEDICINE | Admitting: INTERNAL MEDICINE
Payer: MEDICARE

## 2020-05-04 VITALS — HEIGHT: 62 IN | WEIGHT: 293 LBS | BODY MASS INDEX: 53.92 KG/M2

## 2020-05-04 VITALS — SYSTOLIC BLOOD PRESSURE: 117 MMHG | DIASTOLIC BLOOD PRESSURE: 70 MMHG

## 2020-05-04 VITALS — SYSTOLIC BLOOD PRESSURE: 133 MMHG | DIASTOLIC BLOOD PRESSURE: 73 MMHG

## 2020-05-04 DIAGNOSIS — J96.21: ICD-10-CM

## 2020-05-04 DIAGNOSIS — E66.2: ICD-10-CM

## 2020-05-04 DIAGNOSIS — Z83.3: ICD-10-CM

## 2020-05-04 DIAGNOSIS — D50.9: ICD-10-CM

## 2020-05-04 DIAGNOSIS — J20.9: ICD-10-CM

## 2020-05-04 DIAGNOSIS — K21.9: ICD-10-CM

## 2020-05-04 DIAGNOSIS — I25.10: ICD-10-CM

## 2020-05-04 DIAGNOSIS — Z80.9: ICD-10-CM

## 2020-05-04 DIAGNOSIS — I48.0: ICD-10-CM

## 2020-05-04 DIAGNOSIS — Z79.4: ICD-10-CM

## 2020-05-04 DIAGNOSIS — F32.9: ICD-10-CM

## 2020-05-04 DIAGNOSIS — Z95.5: ICD-10-CM

## 2020-05-04 DIAGNOSIS — N17.9: ICD-10-CM

## 2020-05-04 DIAGNOSIS — I50.33: ICD-10-CM

## 2020-05-04 DIAGNOSIS — R26.89: ICD-10-CM

## 2020-05-04 DIAGNOSIS — J98.11: ICD-10-CM

## 2020-05-04 DIAGNOSIS — E78.5: ICD-10-CM

## 2020-05-04 DIAGNOSIS — J44.1: ICD-10-CM

## 2020-05-04 DIAGNOSIS — Z88.2: ICD-10-CM

## 2020-05-04 DIAGNOSIS — N18.3: ICD-10-CM

## 2020-05-04 DIAGNOSIS — I13.0: Primary | ICD-10-CM

## 2020-05-04 DIAGNOSIS — Z11.59: ICD-10-CM

## 2020-05-04 DIAGNOSIS — Z79.01: ICD-10-CM

## 2020-05-04 DIAGNOSIS — Z87.891: ICD-10-CM

## 2020-05-04 DIAGNOSIS — R33.9: ICD-10-CM

## 2020-05-04 DIAGNOSIS — Z88.8: ICD-10-CM

## 2020-05-04 DIAGNOSIS — E11.22: ICD-10-CM

## 2020-05-04 DIAGNOSIS — Z88.0: ICD-10-CM

## 2020-05-04 DIAGNOSIS — E66.9: ICD-10-CM

## 2020-05-04 DIAGNOSIS — Z82.3: ICD-10-CM

## 2020-05-04 DIAGNOSIS — J44.0: ICD-10-CM

## 2020-05-04 LAB
ALBUMIN SERPL-MCNC: 3.3 G/DL (ref 3.5–5)
ALBUMIN/GLOB SERPL: 0.9 {RATIO} (ref 0.8–2)
ALP SERPL-CCNC: 106 IU/L (ref 40–150)
ALT SERPL-CCNC: 17 IU/L (ref 0–55)
ANION GAP SERPL CALC-SCNC: 12.3 MMOL/L (ref 8–16)
BACTERIA URNS QL MICRO: (no result) /HPF
BASOPHILS # BLD AUTO: 0.1 10*3/UL (ref 0–0.1)
BASOPHILS NFR BLD AUTO: 0.7 % (ref 0–1)
BILIRUB UR QL: NEGATIVE
BUN SERPL-MCNC: 63 MG/DL (ref 7–26)
BUN/CREAT SERPL: 33 (ref 6–25)
CALCIUM SERPL-MCNC: 9.8 MG/DL (ref 8.4–10.2)
CHLORIDE SERPL-SCNC: 98 MMOL/L (ref 98–107)
CK MB SERPL-MCNC: 7.3 NG/ML (ref 0–5)
CK SERPL-CCNC: 64 IU/L (ref 29–168)
CLARITY UR: CLEAR
CO2 SERPL-SCNC: 34 MMOL/L (ref 22–29)
COLOR UR: YELLOW
DEPRECATED NEUTROPHILS # BLD AUTO: 11.6 10*3/UL (ref 2.1–6.9)
DEPRECATED RBC URNS MANUAL-ACNC: (no result) /HPF (ref 0–5)
EGFRCR SERPLBLD CKD-EPI 2021: 27 ML/MIN (ref 60–?)
EOSINOPHIL # BLD AUTO: 0.3 10*3/UL (ref 0–0.4)
EOSINOPHIL NFR BLD AUTO: 2.1 % (ref 0–6)
EPI CELLS URNS QL MICRO: (no result) /LPF
ERYTHROCYTE [DISTWIDTH] IN CORD BLOOD: 13.9 % (ref 11.7–14.4)
GLOBULIN PLAS-MCNC: 3.7 G/DL (ref 2.3–3.5)
GLUCOSE SERPLBLD-MCNC: 227 MG/DL (ref 74–118)
HCT VFR BLD AUTO: 32.7 % (ref 34.2–44.1)
HGB BLD-MCNC: 10.2 G/DL (ref 12–16)
KETONES UR QL STRIP.AUTO: NEGATIVE
LEUKOCYTE ESTERASE UR QL STRIP.AUTO: NEGATIVE
LYMPHOCYTES # BLD: 2.6 10*3/UL (ref 1–3.2)
LYMPHOCYTES NFR BLD AUTO: 16.1 % (ref 18–39.1)
MCH RBC QN AUTO: 28.7 PG (ref 28–32)
MCHC RBC AUTO-ENTMCNC: 31.2 G/DL (ref 31–35)
MCV RBC AUTO: 92.1 FL (ref 81–99)
MONOCYTES # BLD AUTO: 1 10*3/UL (ref 0.2–0.8)
MONOCYTES NFR BLD AUTO: 6.5 % (ref 4.4–11.3)
NEUTS SEG NFR BLD AUTO: 72.7 % (ref 38.7–80)
NITRITE UR QL STRIP.AUTO: NEGATIVE
PLATELET # BLD AUTO: 293 X10E3/UL (ref 140–360)
POTASSIUM SERPL-SCNC: 4.3 MMOL/L (ref 3.5–5.1)
PROT UR QL STRIP.AUTO: (no result)
RBC # BLD AUTO: 3.55 X10E6/UL (ref 3.6–5.1)
SODIUM SERPL-SCNC: 140 MMOL/L (ref 136–145)
SP GR UR STRIP: 1.01 (ref 1.01–1.02)
UROBILINOGEN UR STRIP-MCNC: 0.2 MG/DL (ref 0.2–1)
WBC #/AREA URNS HPF: (no result) /HPF (ref 0–5)
YEAST URNS QL MICRO: (no result)

## 2020-05-04 PROCEDURE — 83880 ASSAY OF NATRIURETIC PEPTIDE: CPT

## 2020-05-04 PROCEDURE — 96372 THER/PROPH/DIAG INJ SC/IM: CPT

## 2020-05-04 PROCEDURE — 82570 ASSAY OF URINE CREATININE: CPT

## 2020-05-04 PROCEDURE — 82553 CREATINE MB FRACTION: CPT

## 2020-05-04 PROCEDURE — 81001 URINALYSIS AUTO W/SCOPE: CPT

## 2020-05-04 PROCEDURE — 93005 ELECTROCARDIOGRAM TRACING: CPT

## 2020-05-04 PROCEDURE — 94640 AIRWAY INHALATION TREATMENT: CPT

## 2020-05-04 PROCEDURE — 84156 ASSAY OF PROTEIN URINE: CPT

## 2020-05-04 PROCEDURE — 80053 COMPREHEN METABOLIC PANEL: CPT

## 2020-05-04 PROCEDURE — 87635 SARS-COV-2 COVID-19 AMP PRB: CPT

## 2020-05-04 PROCEDURE — 76770 US EXAM ABDO BACK WALL COMP: CPT

## 2020-05-04 PROCEDURE — 36415 COLL VENOUS BLD VENIPUNCTURE: CPT

## 2020-05-04 PROCEDURE — 80198 ASSAY OF THEOPHYLLINE: CPT

## 2020-05-04 PROCEDURE — 85025 COMPLETE CBC W/AUTO DIFF WBC: CPT

## 2020-05-04 PROCEDURE — 97139 UNLISTED THERAPEUTIC PX: CPT

## 2020-05-04 PROCEDURE — 82550 ASSAY OF CK (CPK): CPT

## 2020-05-04 PROCEDURE — 93306 TTE W/DOPPLER COMPLETE: CPT

## 2020-05-04 PROCEDURE — 71045 X-RAY EXAM CHEST 1 VIEW: CPT

## 2020-05-04 PROCEDURE — 87040 BLOOD CULTURE FOR BACTERIA: CPT

## 2020-05-04 PROCEDURE — 83735 ASSAY OF MAGNESIUM: CPT

## 2020-05-04 PROCEDURE — 84466 ASSAY OF TRANSFERRIN: CPT

## 2020-05-04 PROCEDURE — 99284 EMERGENCY DEPT VISIT MOD MDM: CPT

## 2020-05-04 PROCEDURE — 84100 ASSAY OF PHOSPHORUS: CPT

## 2020-05-04 PROCEDURE — 83540 ASSAY OF IRON: CPT

## 2020-05-04 PROCEDURE — 84484 ASSAY OF TROPONIN QUANT: CPT

## 2020-05-04 PROCEDURE — 82948 REAGENT STRIP/BLOOD GLUCOSE: CPT

## 2020-05-04 RX ADMIN — APIXABAN SCH MG: 5 TABLET, FILM COATED ORAL at 21:41

## 2020-05-04 RX ADMIN — SODIUM CHLORIDE SCH MLS/HR: 900 INJECTION, SOLUTION INTRAVENOUS at 21:00

## 2020-05-04 RX ADMIN — SERTRALINE HYDROCHLORIDE SCH MG: 100 TABLET, FILM COATED ORAL at 21:00

## 2020-05-04 RX ADMIN — INSULIN HUMAN SCH UNIT: 100 INJECTION, SOLUTION PARENTERAL at 21:40

## 2020-05-04 RX ADMIN — IPRATROPIUM BROMIDE AND ALBUTEROL SULFATE SCH ML: .5; 2.5 SOLUTION RESPIRATORY (INHALATION) at 23:50

## 2020-05-04 RX ADMIN — METOCLOPRAMIDE SCH MG: 10 TABLET ORAL at 21:00

## 2020-05-04 RX ADMIN — Medication SCH MG: at 19:50

## 2020-05-04 RX ADMIN — INSULIN GLARGINE SCH UNITS: 100 INJECTION, SOLUTION SUBCUTANEOUS at 21:40

## 2020-05-04 RX ADMIN — HYDROCODONE BITARTRATE AND ACETAMINOPHEN PRN EA: 10; 325 TABLET ORAL at 21:00

## 2020-05-04 RX ADMIN — IPRATROPIUM BROMIDE AND ALBUTEROL SULFATE SCH ML: .5; 2.5 SOLUTION RESPIRATORY (INHALATION) at 19:50

## 2020-05-04 RX ADMIN — Medication SCH MG: at 21:00

## 2020-05-04 NOTE — DIAGNOSTIC IMAGING REPORT
EXAMINATION:  CHEST SINGLE (PORTABLE)    



INDICATION: Shortness of breath



COMPARISON: Chest radiograph 3/30/2020, chest CT 3/31/2020

     

FINDINGS:



LINES/TUBES:None



LUNGS:The lungs are moderately inflated. There is perihilar fullness and

indistinctness of the pulmonary vasculature. Right basilar patchy opacities.



PLEURA:No pleural effusion or pneumothorax.



MEDIASTINUM:Cardiomediastinal silhouette is stably enlarged.



BONES/SOFT TISSUES:No acute osseous injury.



ABDOMEN:No free air under the diaphragm.





IMPRESSION: 

Cardiomegaly and pulmonary interstitial edema.



Right basilar patchy opacities, most likely subsegmental atelectasis.



Signed by: Harman Ochoa MD on 5/4/2020 2:06 PM
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
## 2020-05-04 NOTE — NUR
Recieved pt in bed sitting up at side of bed.  No c/o at this time no s/sx distress noted.  
Pt a/o x3.  Call light and personal items within reach. Will cont to mon

## 2020-05-04 NOTE — NUR
Pt received from ER at this time. Pt is aox3 and able to verbalize needs. Pt states she 
started to feel short of breath at home, despite having oxygen on. Pt is able to ambulate. 
Denies any pain at this time. Dr. Valladares NP here to see patient.

## 2020-05-05 VITALS — DIASTOLIC BLOOD PRESSURE: 58 MMHG | SYSTOLIC BLOOD PRESSURE: 115 MMHG

## 2020-05-05 VITALS — SYSTOLIC BLOOD PRESSURE: 137 MMHG | DIASTOLIC BLOOD PRESSURE: 61 MMHG

## 2020-05-05 VITALS — SYSTOLIC BLOOD PRESSURE: 119 MMHG | DIASTOLIC BLOOD PRESSURE: 64 MMHG

## 2020-05-05 VITALS — DIASTOLIC BLOOD PRESSURE: 78 MMHG | SYSTOLIC BLOOD PRESSURE: 140 MMHG

## 2020-05-05 VITALS — DIASTOLIC BLOOD PRESSURE: 69 MMHG | SYSTOLIC BLOOD PRESSURE: 146 MMHG

## 2020-05-05 VITALS — SYSTOLIC BLOOD PRESSURE: 146 MMHG | DIASTOLIC BLOOD PRESSURE: 69 MMHG

## 2020-05-05 VITALS — SYSTOLIC BLOOD PRESSURE: 141 MMHG | DIASTOLIC BLOOD PRESSURE: 54 MMHG

## 2020-05-05 LAB
ALBUMIN SERPL-MCNC: 3.2 G/DL (ref 3.5–5)
ALBUMIN/GLOB SERPL: 0.9 {RATIO} (ref 0.8–2)
ALP SERPL-CCNC: 105 IU/L (ref 40–150)
ALT SERPL-CCNC: 17 IU/L (ref 0–55)
ANION GAP SERPL CALC-SCNC: 16.7 MMOL/L (ref 8–16)
BASOPHILS # BLD AUTO: 0.1 10*3/UL (ref 0–0.1)
BASOPHILS NFR BLD AUTO: 0.4 % (ref 0–1)
BUN SERPL-MCNC: 59 MG/DL (ref 7–26)
BUN/CREAT SERPL: 31 (ref 6–25)
CALCIUM SERPL-MCNC: 9.3 MG/DL (ref 8.4–10.2)
CHLORIDE SERPL-SCNC: 99 MMOL/L (ref 98–107)
CK MB SERPL-MCNC: 7.3 NG/ML (ref 0–5)
CK SERPL-CCNC: 47 IU/L (ref 29–168)
CO2 SERPL-SCNC: 29 MMOL/L (ref 22–29)
DEPRECATED NEUTROPHILS # BLD AUTO: 10.8 10*3/UL (ref 2.1–6.9)
DEPRECATED PHOSPHATE SERPL-MCNC: 4.5 MG/DL (ref 2.3–4.7)
EGFRCR SERPLBLD CKD-EPI 2021: 27 ML/MIN (ref 60–?)
EOSINOPHIL # BLD AUTO: 0 10*3/UL (ref 0–0.4)
EOSINOPHIL NFR BLD AUTO: 0 % (ref 0–6)
ERYTHROCYTE [DISTWIDTH] IN CORD BLOOD: 13.9 % (ref 11.7–14.4)
GLOBULIN PLAS-MCNC: 3.6 G/DL (ref 2.3–3.5)
GLUCOSE SERPLBLD-MCNC: 222 MG/DL (ref 74–118)
HCT VFR BLD AUTO: 33.2 % (ref 34.2–44.1)
HGB BLD-MCNC: 10.3 G/DL (ref 12–16)
IRON SATN MFR SERPL: 11 % (ref 15–50)
IRON SERPL-MCNC: 44 UG/DL (ref 50–170)
LYMPHOCYTES # BLD: 0.6 10*3/UL (ref 1–3.2)
LYMPHOCYTES NFR BLD AUTO: 4.9 % (ref 18–39.1)
MAGNESIUM SERPL-MCNC: 2.2 MG/DL (ref 1.3–2.1)
MCH RBC QN AUTO: 28.8 PG (ref 28–32)
MCHC RBC AUTO-ENTMCNC: 31 G/DL (ref 31–35)
MCV RBC AUTO: 92.7 FL (ref 81–99)
MONOCYTES # BLD AUTO: 0.3 10*3/UL (ref 0.2–0.8)
MONOCYTES NFR BLD AUTO: 2.4 % (ref 4.4–11.3)
NEUTS SEG NFR BLD AUTO: 90.3 % (ref 38.7–80)
PLATELET # BLD AUTO: 292 X10E3/UL (ref 140–360)
POTASSIUM SERPL-SCNC: 4.7 MMOL/L (ref 3.5–5.1)
RBC # BLD AUTO: 3.58 X10E6/UL (ref 3.6–5.1)
SODIUM SERPL-SCNC: 140 MMOL/L (ref 136–145)
TIBC SERPL-MCNC: 389 UG/DL (ref 261–478)
TRANSFERRIN SERPL-MCNC: 278 MG/DL (ref 180–382)

## 2020-05-05 RX ADMIN — Medication SCH MG: at 20:38

## 2020-05-05 RX ADMIN — IPRATROPIUM BROMIDE AND ALBUTEROL SULFATE SCH ML: .5; 2.5 SOLUTION RESPIRATORY (INHALATION) at 07:05

## 2020-05-05 RX ADMIN — NYSTATIN SCH EA: 100000 CREAM TOPICAL at 17:09

## 2020-05-05 RX ADMIN — Medication SCH MG: at 09:54

## 2020-05-05 RX ADMIN — Medication SCH MG: at 08:38

## 2020-05-05 RX ADMIN — IPRATROPIUM BROMIDE AND ALBUTEROL SULFATE SCH ML: .5; 2.5 SOLUTION RESPIRATORY (INHALATION) at 23:30

## 2020-05-05 RX ADMIN — FLUTICASONE PROPIONATE AND SALMETEROL SCH EA: 50; 250 POWDER RESPIRATORY (INHALATION) at 07:05

## 2020-05-05 RX ADMIN — BUMETANIDE SCH MG: 0.25 INJECTION INTRAMUSCULAR; INTRAVENOUS at 20:38

## 2020-05-05 RX ADMIN — Medication SCH MG: at 00:02

## 2020-05-05 RX ADMIN — HYDROCODONE BITARTRATE AND ACETAMINOPHEN PRN EA: 10; 325 TABLET ORAL at 20:38

## 2020-05-05 RX ADMIN — Medication SCH MG: at 14:55

## 2020-05-05 RX ADMIN — ESTROGENS, CONJUGATED SCH MG: 0.62 TABLET, FILM COATED ORAL at 09:53

## 2020-05-05 RX ADMIN — PANTOPRAZOLE SODIUM SCH MG: 40 TABLET, DELAYED RELEASE ORAL at 08:36

## 2020-05-05 RX ADMIN — GUAIFENESIN AND DEXTROMETHORPHAN HYDROBROMIDE SCH EACH: 600; 30 TABLET, EXTENDED RELEASE ORAL at 08:37

## 2020-05-05 RX ADMIN — EZETIMIBE SCH MG: 10 TABLET ORAL at 08:38

## 2020-05-05 RX ADMIN — IPRATROPIUM BROMIDE AND ALBUTEROL SULFATE SCH ML: .5; 2.5 SOLUTION RESPIRATORY (INHALATION) at 11:13

## 2020-05-05 RX ADMIN — NYSTATIN SCH EA: 100000 CREAM TOPICAL at 09:54

## 2020-05-05 RX ADMIN — HYDROCODONE BITARTRATE AND ACETAMINOPHEN PRN EA: 10; 325 TABLET ORAL at 12:39

## 2020-05-05 RX ADMIN — INSULIN GLARGINE SCH UNITS: 100 INJECTION, SOLUTION SUBCUTANEOUS at 20:39

## 2020-05-05 RX ADMIN — METOCLOPRAMIDE SCH MG: 10 TABLET ORAL at 08:37

## 2020-05-05 RX ADMIN — APIXABAN SCH MG: 5 TABLET, FILM COATED ORAL at 08:41

## 2020-05-05 RX ADMIN — BUMETANIDE SCH MG: 0.25 INJECTION INTRAMUSCULAR; INTRAVENOUS at 17:06

## 2020-05-05 RX ADMIN — INSULIN HUMAN SCH UNIT: 100 INJECTION, SOLUTION PARENTERAL at 09:10

## 2020-05-05 RX ADMIN — SERTRALINE HYDROCHLORIDE SCH MG: 100 TABLET, FILM COATED ORAL at 20:38

## 2020-05-05 RX ADMIN — Medication SCH MG: at 17:09

## 2020-05-05 RX ADMIN — INSULIN HUMAN SCH UNIT: 100 INJECTION, SOLUTION PARENTERAL at 17:09

## 2020-05-05 RX ADMIN — INSULIN GLARGINE SCH UNITS: 100 INJECTION, SOLUTION SUBCUTANEOUS at 09:09

## 2020-05-05 RX ADMIN — METOCLOPRAMIDE SCH MG: 10 TABLET ORAL at 20:38

## 2020-05-05 RX ADMIN — INSULIN HUMAN SCH UNIT: 100 INJECTION, SOLUTION PARENTERAL at 20:39

## 2020-05-05 RX ADMIN — Medication SCH MG: at 07:05

## 2020-05-05 RX ADMIN — TIOTROPIUM BROMIDE SCH MCG: 18 CAPSULE ORAL; RESPIRATORY (INHALATION) at 07:05

## 2020-05-05 RX ADMIN — IPRATROPIUM BROMIDE AND ALBUTEROL SULFATE SCH ML: .5; 2.5 SOLUTION RESPIRATORY (INHALATION) at 14:55

## 2020-05-05 RX ADMIN — HYDROCODONE BITARTRATE AND ACETAMINOPHEN PRN EA: 10; 325 TABLET ORAL at 08:38

## 2020-05-05 RX ADMIN — INSULIN HUMAN SCH UNIT: 100 INJECTION, SOLUTION PARENTERAL at 12:34

## 2020-05-05 RX ADMIN — IPRATROPIUM BROMIDE AND ALBUTEROL SULFATE SCH ML: .5; 2.5 SOLUTION RESPIRATORY (INHALATION) at 03:30

## 2020-05-05 RX ADMIN — IPRATROPIUM BROMIDE AND ALBUTEROL SULFATE SCH ML: .5; 2.5 SOLUTION RESPIRATORY (INHALATION) at 19:25

## 2020-05-05 RX ADMIN — PREDNISONE SCH MG: 20 TABLET ORAL at 09:54

## 2020-05-05 RX ADMIN — METOCLOPRAMIDE SCH MG: 10 TABLET ORAL at 17:07

## 2020-05-05 RX ADMIN — Medication SCH MG: at 19:25

## 2020-05-05 RX ADMIN — GUAIFENESIN AND DEXTROMETHORPHAN HYDROBROMIDE SCH EACH: 600; 30 TABLET, EXTENDED RELEASE ORAL at 17:09

## 2020-05-05 RX ADMIN — METOPROLOL TARTRATE SCH MG: 25 TABLET, FILM COATED ORAL at 08:36

## 2020-05-05 NOTE — CONSULTATION
DATE OF CONSULTATION:  

 

Pulmonary Consultation

 

Patient of Dr. Valladares and Dr. Lupillo Roy.

 

HISTORY OF PRESENT ILLNESS:  Charming, but unfortunate 59-year-old woman with a history

of diastolic heart failure, coronary artery disease, obesity, hypoventilation syndrome,

on home mechanical ventilator support via face mask.  She is admitted with swelling,

shortness of breath and weight gain.  She is reported to have been hypoxic at home.  She

has a history of gastroesophageal reflux and depression, chronic kidney disease,

hypertension, diabetes, asthma, coronary artery disease with stent placed 15 years ago. 

 

ALLERGIES:  PENICILLIN, SULFA, XARELTO, AND NAPROSYN.

 

MEDICATIONS:  Include:

1. Reglan.

2. Eliquis.

3. Lipitor.

4. Bumex.

5. Valium.

6. Premarin.

7. Zetia.

8. Advair.

9. Vicodin.

10. Insulin.

11. Metoprolol.

12. Protonix.

13. Zoloft.

14. Imitrex.

15. Theophylline.

 

PAST SURGICAL HISTORY:  She is on trilogy ventilator at home.  She has had tubal

ligation, oophorectomy. 

 

SOCIAL HISTORY:  Quit smoking in 2017, smoked for approximately 50 years, a pack a day,

according to record. 

 

FAMILY HISTORY:  Positive for cancer, strokes and diabetes.

 

PHYSICAL EXAMINATION:

GENERAL:  She is awake and alert, in no acute distress.  Feels better.  She was

tolerating the Mucomyst, but does not particularly like it.  She has had no sputum

production since she has quit smoking. 

VITAL SIGNS:  Temperature 97.9, pulse 92 and regular, respirations 20, blood pressure

146/69.  She is on 4 L nasal oxygen. 

HEAD:  Normocephalic, atraumatic. 

EYES:  Extraocular movements intact. 

LUNGS:  Bilateral rales. 

HEART:  Regular rhythm. 

ABDOMEN:  Obese. 

EXTREMITIES:  Trace edema in legs.

IMPRESSION:  

1. Congestive heart failure.

2. Acute exacerbation of chronic obstructive pulmonary disease.

3. Obesity hypoventilation syndrome.

4. Anemia.

5. Chronic kidney disease.

 

PLAN:  Moderate dose steroids, bronchodilators, Levaquin, diuresis, nocturnal

ventilation.  Continue home medications.  Continue anticoagulation.  There is no obvious

pneumonia on chest x-ray, but findings more consistent with a vascular congestion a

large part. 

 

Thank you for this kind referral.

 

 

 

 

______________________________

MD TJ Alford/CESILIA

D:  05/05/2020 09:01:10

T:  05/05/2020 09:29:21

Job #:  [id number]

## 2020-05-05 NOTE — CONSULTATION
DATE OF CONSULTATION:  05/05/2020

 

Cardiology Consultation 

 

CONSULTING PHYSICIAN:  Kemar Contreras MD, Interventional Cardiology

 

REASON FOR CONSULTATION:  Shortness of breath.

 

HISTORY OF PRESENT ILLNESS:  A 59-year-old woman with history of morbid obesity, COPD,

type 2 diabetes mellitus, CKD stage 3, paroxysmal atrial fibrillation on Eliquis,

obstructive sleep apnea, chronic diastolic heart failure and iron deficiency anemia,

reflux, presents with complaints of worsening shortness of breath.  She is somnolent

today.  Denies any chest discomfort.  Symptoms have been worsening over the last several

days.  She denies any syncope.  She denies any recent palpitations. 

 

REVIEW OF SYSTEMS:

A 12-system review negative except for as noted above.

 

ALLERGIES:  RIVAROXABAN, NAPROXEN, SULFA, PENICILLIN PER EMR.

 

SOCIAL HISTORY:  No alcohol or drugs.

 

FAMILY HISTORY:  Noncontributory.

 

PHYSICAL EXAMINATION:

VITAL SIGNS:  Temperature 97.6, heart rate of 109, blood pressure 146/69, respiratory

rate 20, O2 saturation 98%.  BMI 53.7. 

GENERAL:  Chronically ill-appearing, dishevelled, alert, awake, however, somnolent. 

NECK:  JVD distended. 

CHEST:  Decreased breath sounds in bilateral bases and scattered rhonchi. 

CARDIOVASCULAR:  Regular rate and rhythm.  Normal S1, S2. 

ABDOMEN:  Soft, morbidly obese. 

EXTREMITIES:  With 1+ edema.

CARDIOVASCULAR MEDICATIONS:  Reviewed.  Started on Bumex 1 mg daily, metoprolol tartrate

25 mg daily, Eliquis 5 mg every 12 hours, ezetimibe 10 mg daily, atorvastatin 40 mg at

bedtime. 

 

STUDIES:  Reviewed.  Sodium 140, potassium 4.7, chloride 99, bicarbonate 29, BUN 59,

creatinine 1.89, glucose 222.  White blood cells 11.9, hemoglobin 10.3, platelets 292.

AST 14, ALT 17, and alkaline phosphatase 105, total bilirubin 0.2.  Troponin I of 0.023

and then 0.064.  BNP elevated at 456.  Chest x-ray with cardiomegaly and interstitial

pulmonary edema with bibasilar opacities. 

 

ASSESSMENT AND PLAN:  A 59-year-old woman presents with acute on chronic diastolic heart

failure, history of chronic obstructive pulmonary disease, diabetes type 2, chronic

kidney disease stage 3, paroxysmal atrial fibrillation, obstructive sleep apnea, morbid

obesity, iron deficiency anemic, gastroesophageal reflux disease. Recommend, 

1. Continue diuretics.

2. Continue antihypertensives.

3. Continue anticoagulation.

4. Continue statin therapy.

5. Low-sodium diet. 

 

Thank you for the opportunity to participate in the care of this patient.

 

 

 

 

______________________________

MD YANIV Covarrubias/CESILIA

D:  05/05/2020 11:07:11

T:  05/05/2020 12:11:00

Job #:  [id number]
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
## 2020-05-05 NOTE — DIAGNOSTIC IMAGING REPORT
EXAM:  US RENAL RETROPERITONEAL COMP



DATE: 5/5/2020 1:41 PM  



INDICATION: Acute kidney injury  



COMPARISON: Limited abdominal ultrasound from 3/30/2020



FINDINGS:

The right kidney is normal in size measuring 10.6 x 5.5 x 4.6 cm with cortical

thickness of 1.4 cm. Cortical echogenicity is within normal limits. There is no

evidence for solid renal mass, hydronephrosis, or shadowing calculi.



The left kidney is normal in size measuring 9.8 x 5.9 x 4.1 cm with cortical

thickness of 1.5 cm. Cortical echogenicity is within normal limits. There is no

evidence for solid renal mass, hydronephrosis, or shadowing calculi.



The urinary bladder is only partially distended. Prevoid volume is 57 cc.





IMPRESSION:



Unremarkable sonographic appearance of the kidneys.



Signed by: Dr. Ricardo Bishop MD on 5/5/2020 2:54 PM

## 2020-05-05 NOTE — NUR
Received bedside report from day nurse. Patient awake and sitting up in bed, no s/s of 
distress at this time. Bed locked and in low position, side rails up, call light placed 
within reach. Patient instructed to call for assistance if needed, verbalized understanding. 
All safety measures in place. Will continue to monitor.

## 2020-05-06 VITALS — DIASTOLIC BLOOD PRESSURE: 64 MMHG | SYSTOLIC BLOOD PRESSURE: 134 MMHG

## 2020-05-06 VITALS — SYSTOLIC BLOOD PRESSURE: 134 MMHG | DIASTOLIC BLOOD PRESSURE: 64 MMHG

## 2020-05-06 VITALS — SYSTOLIC BLOOD PRESSURE: 133 MMHG | DIASTOLIC BLOOD PRESSURE: 60 MMHG

## 2020-05-06 VITALS — DIASTOLIC BLOOD PRESSURE: 71 MMHG | SYSTOLIC BLOOD PRESSURE: 138 MMHG

## 2020-05-06 VITALS — SYSTOLIC BLOOD PRESSURE: 129 MMHG | DIASTOLIC BLOOD PRESSURE: 69 MMHG

## 2020-05-06 LAB
ALBUMIN SERPL-MCNC: 3.1 G/DL (ref 3.5–5)
ALBUMIN/GLOB SERPL: 0.9 {RATIO} (ref 0.8–2)
ALP SERPL-CCNC: 90 IU/L (ref 40–150)
ALT SERPL-CCNC: 17 IU/L (ref 0–55)
ANION GAP SERPL CALC-SCNC: 11.6 MMOL/L (ref 8–16)
BASOPHILS # BLD AUTO: 0.1 10*3/UL (ref 0–0.1)
BASOPHILS NFR BLD AUTO: 0.5 % (ref 0–1)
BUN SERPL-MCNC: 60 MG/DL (ref 7–26)
BUN/CREAT SERPL: 29 (ref 6–25)
CALCIUM SERPL-MCNC: 9.2 MG/DL (ref 8.4–10.2)
CHLORIDE SERPL-SCNC: 100 MMOL/L (ref 98–107)
CO2 SERPL-SCNC: 35 MMOL/L (ref 22–29)
CREAT UR-MCNC: 57.58 MG/DL (ref 47–110)
DEPRECATED NEUTROPHILS # BLD AUTO: 11.2 10*3/UL (ref 2.1–6.9)
EGFRCR SERPLBLD CKD-EPI 2021: 24 ML/MIN (ref 60–?)
EOSINOPHIL # BLD AUTO: 0.1 10*3/UL (ref 0–0.4)
EOSINOPHIL NFR BLD AUTO: 0.5 % (ref 0–6)
ERYTHROCYTE [DISTWIDTH] IN CORD BLOOD: 14.2 % (ref 11.7–14.4)
GLOBULIN PLAS-MCNC: 3.4 G/DL (ref 2.3–3.5)
GLUCOSE SERPLBLD-MCNC: 77 MG/DL (ref 74–118)
HCT VFR BLD AUTO: 32.8 % (ref 34.2–44.1)
HGB BLD-MCNC: 9.8 G/DL (ref 12–16)
LYMPHOCYTES # BLD: 2.5 10*3/UL (ref 1–3.2)
LYMPHOCYTES NFR BLD AUTO: 16.2 % (ref 18–39.1)
MCH RBC QN AUTO: 28.7 PG (ref 28–32)
MCHC RBC AUTO-ENTMCNC: 29.9 G/DL (ref 31–35)
MCV RBC AUTO: 96.2 FL (ref 81–99)
MONOCYTES # BLD AUTO: 0.9 10*3/UL (ref 0.2–0.8)
MONOCYTES NFR BLD AUTO: 5.6 % (ref 4.4–11.3)
NEUTS SEG NFR BLD AUTO: 73.6 % (ref 38.7–80)
PLATELET # BLD AUTO: 249 X10E3/UL (ref 140–360)
POTASSIUM SERPL-SCNC: 4.6 MMOL/L (ref 3.5–5.1)
PROT UR-MCNC: 42.1 MG/DL (ref 1–14)
RBC # BLD AUTO: 3.41 X10E6/UL (ref 3.6–5.1)
SODIUM SERPL-SCNC: 142 MMOL/L (ref 136–145)

## 2020-05-06 RX ADMIN — GUAIFENESIN AND DEXTROMETHORPHAN HYDROBROMIDE SCH EACH: 600; 30 TABLET, EXTENDED RELEASE ORAL at 09:11

## 2020-05-06 RX ADMIN — EZETIMIBE SCH MG: 10 TABLET ORAL at 09:11

## 2020-05-06 RX ADMIN — IPRATROPIUM BROMIDE AND ALBUTEROL SULFATE SCH ML: .5; 2.5 SOLUTION RESPIRATORY (INHALATION) at 03:25

## 2020-05-06 RX ADMIN — ESTROGENS, CONJUGATED SCH MG: 0.62 TABLET, FILM COATED ORAL at 11:20

## 2020-05-06 RX ADMIN — BUMETANIDE SCH MG: 0.25 INJECTION INTRAMUSCULAR; INTRAVENOUS at 05:45

## 2020-05-06 RX ADMIN — METOPROLOL TARTRATE SCH MG: 25 TABLET, FILM COATED ORAL at 09:11

## 2020-05-06 RX ADMIN — IPRATROPIUM BROMIDE AND ALBUTEROL SULFATE SCH ML: .5; 2.5 SOLUTION RESPIRATORY (INHALATION) at 06:40

## 2020-05-06 RX ADMIN — FLUTICASONE PROPIONATE AND SALMETEROL SCH EA: 50; 250 POWDER RESPIRATORY (INHALATION) at 06:55

## 2020-05-06 RX ADMIN — INSULIN HUMAN SCH UNIT: 100 INJECTION, SOLUTION PARENTERAL at 07:30

## 2020-05-06 RX ADMIN — NYSTATIN SCH EA: 100000 CREAM TOPICAL at 09:11

## 2020-05-06 RX ADMIN — IPRATROPIUM BROMIDE AND ALBUTEROL SULFATE SCH ML: .5; 2.5 SOLUTION RESPIRATORY (INHALATION) at 11:00

## 2020-05-06 RX ADMIN — IPRATROPIUM BROMIDE AND ALBUTEROL SULFATE SCH ML: .5; 2.5 SOLUTION RESPIRATORY (INHALATION) at 14:40

## 2020-05-06 RX ADMIN — TIOTROPIUM BROMIDE SCH MCG: 18 CAPSULE ORAL; RESPIRATORY (INHALATION) at 06:55

## 2020-05-06 RX ADMIN — Medication SCH MG: at 09:11

## 2020-05-06 RX ADMIN — APIXABAN SCH MG: 5 TABLET, FILM COATED ORAL at 09:11

## 2020-05-06 RX ADMIN — Medication SCH MG: at 01:00

## 2020-05-06 RX ADMIN — INSULIN HUMAN SCH UNIT: 100 INJECTION, SOLUTION PARENTERAL at 11:30

## 2020-05-06 RX ADMIN — HYDROCODONE BITARTRATE AND ACETAMINOPHEN PRN EA: 10; 325 TABLET ORAL at 04:30

## 2020-05-06 RX ADMIN — PANTOPRAZOLE SODIUM SCH MG: 40 TABLET, DELAYED RELEASE ORAL at 08:00

## 2020-05-06 RX ADMIN — HYDROCODONE BITARTRATE AND ACETAMINOPHEN PRN EA: 10; 325 TABLET ORAL at 14:05

## 2020-05-06 RX ADMIN — INSULIN GLARGINE SCH UNITS: 100 INJECTION, SOLUTION SUBCUTANEOUS at 09:13

## 2020-05-06 RX ADMIN — PREDNISONE SCH MG: 20 TABLET ORAL at 09:11

## 2020-05-06 RX ADMIN — METOCLOPRAMIDE SCH MG: 10 TABLET ORAL at 09:11

## 2020-05-06 NOTE — NUR
PATIENT IN BED RESTING  WITH HEAD OF BED ELEVATED, NO DISTRESS NOTED. O2 IN PLACE VIA N/C. 
BED IN LOWER POSITION, CALL LIGHT AT REACH.

## 2020-05-06 NOTE — NUR
ORDERS FOR HOME HEALTH CARE, SKILLED NURSING EVAL AND TREAT, HOME PT/OT EVAL AND TREAT, CHF 
PROGRAM

CHOICE LETTER SIGNED FOR [facility] (ON SERVICE PREVIOUSLY)

COPY OF CHOICE LETTER TO PT

CM CALLED AND SPOKE WITH GENO AT [facility]

CONFIRMED ADDRESS AND PHONE NUMBER WITH PT

PH: [phone]  FAX: [phone]

CLINICALS FAXED; CONFIRMATION REC'D

DC HOME TODAY

## 2020-05-06 NOTE — NUR
Bedside report given to day nurse. Patient in stable condition, no s/s of distress at this 
time. All safety measures in place.

## 2020-05-06 NOTE — PROGRESS NOTE
DATE:  

 

Cardiology Progress Note 

 

SUBJECTIVE:  Continues to complain of shortness of breath, however, slightly improved

compared to yesterday. 

 

OBJECTIVE:  VITAL SIGNS:  Temperature 96.8, heart rate 92, blood pressure 134/63,

respiratory rate 18, O2 saturation 91% on nasal cannula, BMI 53.7. 

GENERAL:  In no acute distress.  Alert. 

NECK:  JVD distended. 

CHEST:  Decreased breath sounds in bilateral bases. 

CARDIOVASCULAR:  Regular rate and rhythm.  Normal S1 and S2. 

ABDOMEN:  Soft.  Bowel sounds positive. 

EXTREMITIES:  With 1+ edema both lower extremities.

 

CARDIOVASCULAR MEDICATIONS:  Reviewed.  Eliquis 5 mg b.i.d., atorvastatin 40 mg at

bedtime, metoprolol tartrate 25 mg daily. 

 

STUDIES:  Reviewed.  Sodium 142, potassium 4.6, creatinine 2.08.  White blood cells

15.2, hemoglobin 9.8, platelets 249.  AST 14, ALT 17, alkaline phosphatase 90. 

 

ASSESSMENT AND PLAN:  A 59-year-old woman with:

1. Acute on chronic diastolic heart failure.

2. Chronic obstructive pulmonary disease.

3. Morbid obesity.

4. Type 2 diabetes mellitus.

5. Paroxysmal atrial fibrillation.

 

RECOMMEND:  

1. Uptitrate Bumex to 2 mg daily if okay with Nephrology.

2. Monitor renal function.

3. Monitor H and H.

4. Continue rest of cardiovascular medications for now.

 

 

 

 

______________________________

MD CANDIE CovarrubiasV/MODL

D:  05/06/2020 17:56:59

T:  05/06/2020 20:16:58

Job #:  [id number]

## 2020-05-06 NOTE — NUR
PATIENT DISCHARGED HOME. DISCHARGE INSTRUCTIONS, PRESCRIPTIONS, AND FOLLOW UP GIVEN TO 
PATIENT, SHE VERBALIZED UNDERSTANDING. IV TO RIGHT FOREARM REMOVED WITH TIP INTACT. ALL 
PERSONAL ITEMS TAKEN WITH PATIENT. LEFT UNIT PER WHEEL CHAIR TO FRONT LOBBY IN STABLE 
CONDITION.

## 2020-05-06 NOTE — NUR
Nutrition Screen Note



RD Recommendation for Physician: 

-Continue current diet as ordered



Plan of Care: RD following, monitoring for tolerance and adequacy



Nutrition reason for involvement: Diagnosis- CHF



Primary Diagnose(s): COPD, CHF



PMH: COPD, type 2 diabetes mellitus, chronic kidney disease stage 3, paroxysmal atrial 
fibrillation, obstructive sleep apnea, chronic respiratory failure, hypertension, chronic 
diastolic congestive heart failure, morbid obesity, depression, GERD, and iron deficiency 
anemia



Ht: 62 in 

Wt:294 lb

BMI: 53.8 kg/m2

IBW: 110 lb



RD Assessment:

(5/6/20) Chart reviewed. Labs and meds reviewed. Pt is a 59 year old female admitted with 
COPD and CHF. Pt reports eating <50% of her meals; however, it is recorded that pt has been 
consuming % of meals from 5/5-5/6. Pt also mentioned she had gained weight due to 
fluid. Pt reports some nausea. Pt stated she already has information regarding diabetic and 
heart healthy diets, but was interested in diet education for CKD which was provided. Will 
continue to monitor.



Current Diet: renal/1800 kcal diabetic diet with 1.5 L fluid restriction



Malnutrition Evaluation (5/6/20)

The patient does not meet criteria for a specified degree of malnutrition at this time. Will 
re-evaluate at follow-up as appropriate. 





Diet Education Needs Assessment: Diet education indicated, Pt was interested in diet 
education for CKD



Learner(s): pt

Barriers: no barriers identified 

Cultural/Language Modifications: no cultural/language modifications

Readiness: eager

Method: explanation/discussion/handout

Topics: CKD diet

Understanding/Compliance: pt verbalized understanding 





Nutrition Care Level: low





Signed: Yarelis Helm, RD, LD
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
## 2020-05-07 NOTE — DISCHARGE SUMMARY
ADMISSION DIAGNOSES:  

1. Acute exacerbation of chronic obstructive pulmonary disease.

2. Acute on chronic systolic congestive heart failure.

3. Hyperlipidemia.

4. Type 2 diabetes.

5. Hypertension.

6. Gastroesophageal reflux disease.

7. Depression.

 

DISCHARGE DIAGNOSES:  

1. Acute exacerbation of chronic obstructive pulmonary disease.

2. Acute on chronic systolic congestive heart failure.

3. Hyperlipidemia.

4. Type 2 diabetes.

5. Hypertension.

6. Gastroesophageal reflux disease.

7. Depression.

 

HOSPITAL COURSE:  A 59-year-old female with frequent admissions due to COPD and CHF.

She complains of low oxygen levels at home over the last 2 days.  According to ER

records, her SpO2 was 100% on the home dose of 4 L nasal cannula.  It says she was

recently given prescriptions for doxycycline and prednisone as well as Cipro, which she

completed.  She also says she has had poor urinary output and requesting for a Baptiste

catheter to be placed, because she feels like she is not emptying her bladder.  On

admission, Corona virus was tested, which was negative.  Chest x-ray showed cardiomegaly

and pulmonary interstitial edema, right basilar patchy opacity, most likely subsegmental

atelectasis.  The patient has CKD 3 and her renal function appears at baseline, but per

Nephrology recommendation, a renal ultrasound was ordered, which was normal.  Blood

cultures were negative.  Postvoid residual was taken and the patient had 0 mL in the

bladder, so Baptiste was not placed per patient's request.  The patient appeared at

baseline from admission, but continued to complain of shortness of breath.  She was

started on IV Bumex per Nephrology recommendation for a couple of days.  On her

admission, the BNP was barely over 400, which appears to be at her baseline as well.

She uses noninvasive vent and 4 L nasal cannula at home at baseline.  The patient was on

4 L nasal cannula with clear lung sounds for 2 days prior to discharge.  The patient was

informed that she was going to be going home and wanted to "bribe me with candy to spend

another night in the hospital."  I told her that if she was going to stay in the

hospital, I needed a medical reason to keep her there and she seemed at baseline already

for 2 days.  I did discuss the case with Dr. Valladares.  The patient will follow up in 1

week because Dr. Valladares is the primary care physician.  The patient was also requesting

a very specific full size hospital bed and a walker with padding and wanted to speak to

Case Management about both of them.  I explained her that she had to qualify and she

does not qualify and Case Management explained that to her as well.  Physical Therapy

went to assess the patient.  As she frequently admits to the hospital, we are aware that

she is ambulatory, but due to hospitalization, she had to be re-evaluated.  She acted as

if she was too weak to walk with physical therapy, but since Physical Therapy had not

left the unit yet, they saw her walking independently in her room after their evaluation

where she said she could not walk.  Again being told that she was going discharge home,

she again asked me what she could do to stay in the hospital.  She asked me if she

started throwing up would I let her stay in the hospital again.  I told her no, because

I had seen her eat without any nausea, vomiting, or pain already.  Physical therapy and

DME were setup per Case Management and the patient will discharge home.  She is again at

baseline and has all necessary oxygen and DME available to her.  Her son will pick her

up and take her home.  She will follow up with primary care, Dr. De La Cruz, and Dr. Muhammad in

1-2 

weeks.  The patient understands discharge instructions and agrees to plan.  Vital signs

stable.  The patient afebrile. 

 

 

Dictated by Catrina Martinez NP

 

______________________________

MD ROBBIE Zamudio/CESILIA

D:  05/06/2020 19:39:34

T:  05/07/2020 05:21:57

Job #:  [id number]

## 2020-05-21 ENCOUNTER — HOSPITAL ENCOUNTER (EMERGENCY)
Dept: HOSPITAL 88 - ER | Age: 60
Discharge: ANOTHER HOSPITAL | End: 2020-05-21
Payer: COMMERCIAL

## 2020-05-21 VITALS — BODY MASS INDEX: 53.92 KG/M2 | HEIGHT: 62 IN | WEIGHT: 293 LBS

## 2020-05-21 DIAGNOSIS — E66.01: ICD-10-CM

## 2020-05-21 DIAGNOSIS — E11.65: ICD-10-CM

## 2020-05-21 DIAGNOSIS — I50.9: ICD-10-CM

## 2020-05-21 DIAGNOSIS — I10: ICD-10-CM

## 2020-05-21 DIAGNOSIS — E78.5: ICD-10-CM

## 2020-05-21 DIAGNOSIS — J44.9: ICD-10-CM

## 2020-05-21 DIAGNOSIS — R60.9: ICD-10-CM

## 2020-05-21 DIAGNOSIS — Z99.81: ICD-10-CM

## 2020-05-21 DIAGNOSIS — J96.92: Primary | ICD-10-CM

## 2020-05-21 LAB
ALBUMIN SERPL-MCNC: 2.8 G/DL (ref 3.5–5)
ALBUMIN/GLOB SERPL: 0.8 {RATIO} (ref 0.8–2)
ALP SERPL-CCNC: 100 IU/L (ref 40–150)
ALT SERPL-CCNC: 12 IU/L (ref 0–55)
ANION GAP SERPL CALC-SCNC: 12.6 MMOL/L (ref 8–16)
BACTERIA URNS QL MICRO: (no result) /HPF
BASE EXCESS BLDA CALC-SCNC: 8 MMOL/L (ref -2–3)
BASOPHILS # BLD AUTO: 0 10*3/UL (ref 0–0.1)
BASOPHILS NFR BLD AUTO: 0.4 % (ref 0–1)
BILIRUB UR QL: NEGATIVE
BUN SERPL-MCNC: 43 MG/DL (ref 7–26)
BUN/CREAT SERPL: 23 (ref 6–25)
CALCIUM SERPL-MCNC: 8.7 MG/DL (ref 8.4–10.2)
CHLORIDE SERPL-SCNC: 99 MMOL/L (ref 98–107)
CK MB SERPL-MCNC: 2.5 NG/ML (ref 0–5)
CK SERPL-CCNC: 64 IU/L (ref 29–168)
CLARITY UR: (no result)
CO2 SERPL-SCNC: 31 MMOL/L (ref 22–29)
COLOR UR: YELLOW
DEPRECATED NEUTROPHILS # BLD AUTO: 8.4 10*3/UL (ref 2.1–6.9)
DEPRECATED RBC URNS MANUAL-ACNC: (no result) /HPF (ref 0–5)
EGFRCR SERPLBLD CKD-EPI 2021: 28 ML/MIN (ref 60–?)
EOSINOPHIL # BLD AUTO: 0.3 10*3/UL (ref 0–0.4)
EOSINOPHIL NFR BLD AUTO: 2.8 % (ref 0–6)
EPI CELLS URNS QL MICRO: (no result) /LPF
ERYTHROCYTE [DISTWIDTH] IN CORD BLOOD: 14.6 % (ref 11.7–14.4)
GLOBULIN PLAS-MCNC: 3.5 G/DL (ref 2.3–3.5)
GLUCOSE SERPLBLD-MCNC: 196 MG/DL (ref 74–118)
HCO3 BLDA-SCNC: 35 MMOL/L (ref 22–26)
HCT VFR BLD AUTO: 31.6 % (ref 34.2–44.1)
HGB BLD-MCNC: 9.3 G/DL (ref 12–16)
HYPOCHROMIA BLD QL SMEAR: SLIGHT
KETONES UR QL STRIP.AUTO: NEGATIVE
LEUKOCYTE ESTERASE UR QL STRIP.AUTO: NEGATIVE
LYMPHOCYTES # BLD: 1 10*3/UL (ref 1–3.2)
LYMPHOCYTES NFR BLD AUTO: 10 % (ref 18–39.1)
MCH RBC QN AUTO: 27.8 PG (ref 28–32)
MCHC RBC AUTO-ENTMCNC: 29.4 G/DL (ref 31–35)
MCV RBC AUTO: 94.6 FL (ref 81–99)
MONOCYTES # BLD AUTO: 0.7 10*3/UL (ref 0.2–0.8)
MONOCYTES NFR BLD AUTO: 6.3 % (ref 4.4–11.3)
NEUTS SEG NFR BLD AUTO: 80.1 % (ref 38.7–80)
NITRITE UR QL STRIP.AUTO: NEGATIVE
PCO2 BLDA: (no result) MMHG (ref 80–105)
PCO2 BLDA: 73 MMHG (ref 35–45)
PH BLDA: 7.29 [PH] (ref 7.35–7.45)
PLAT MORPH BLD: NORMAL
PLATELET # BLD AUTO: 175 X10E3/UL (ref 140–360)
PLATELET # BLD EST: ADEQUATE 10*3/UL
POTASSIUM SERPL-SCNC: 4.6 MMOL/L (ref 3.5–5.1)
PROT UR QL STRIP.AUTO: (no result)
RBC # BLD AUTO: 3.34 X10E6/UL (ref 3.6–5.1)
RBC MORPH BLD: NORMAL
SAO2 % BLDA: (no result) % (ref 95–98)
SODIUM SERPL-SCNC: 138 MMOL/L (ref 136–145)
SP GR UR STRIP: 1.02 (ref 1.01–1.02)
UROBILINOGEN UR STRIP-MCNC: 0.2 MG/DL (ref 0.2–1)
WBC #/AREA URNS HPF: (no result) /HPF (ref 0–5)

## 2020-05-21 PROCEDURE — 81001 URINALYSIS AUTO W/SCOPE: CPT

## 2020-05-21 PROCEDURE — 82553 CREATINE MB FRACTION: CPT

## 2020-05-21 PROCEDURE — 85025 COMPLETE CBC W/AUTO DIFF WBC: CPT

## 2020-05-21 PROCEDURE — 99285 EMERGENCY DEPT VISIT HI MDM: CPT

## 2020-05-21 PROCEDURE — 82805 BLOOD GASES W/O2 SATURATION: CPT

## 2020-05-21 PROCEDURE — 93005 ELECTROCARDIOGRAM TRACING: CPT

## 2020-05-21 PROCEDURE — 36600 WITHDRAWAL OF ARTERIAL BLOOD: CPT

## 2020-05-21 PROCEDURE — 83880 ASSAY OF NATRIURETIC PEPTIDE: CPT

## 2020-05-21 PROCEDURE — 94660 CPAP INITIATION&MGMT: CPT

## 2020-05-21 PROCEDURE — 87635 SARS-COV-2 COVID-19 AMP PRB: CPT

## 2020-05-21 PROCEDURE — 36415 COLL VENOUS BLD VENIPUNCTURE: CPT

## 2020-05-21 PROCEDURE — 82550 ASSAY OF CK (CPK): CPT

## 2020-05-21 PROCEDURE — 84484 ASSAY OF TROPONIN QUANT: CPT

## 2020-05-21 PROCEDURE — 80053 COMPREHEN METABOLIC PANEL: CPT

## 2020-05-21 PROCEDURE — 94640 AIRWAY INHALATION TREATMENT: CPT

## 2020-05-21 PROCEDURE — 71045 X-RAY EXAM CHEST 1 VIEW: CPT

## 2020-05-21 NOTE — XMS REPORT
Patient Summary Document

                             Created on: 2020



MARGY PEÑA

External Reference #: [id number]

: 1960

Sex: Female



Demographics





                          Address                   [address] [place]  [address]

 

                          Home Phone                [phone]

 

                          Preferred Language        Unknown

 

                          Marital Status            Unknown

 

                          Church Affiliation     Unknown

 

                          Race                      Unknown

 

                          Additional Race(s)        White



 

                          Ethnic Group              Unknown





Author





                          Author                    [facility]

t

 

                          Organization              [facility]

 

                          Address                   [address]

[place]  [address]



 

                          Phone                     Unavailable







Support





                Name            Relationship    Address         Phone

 

                    OLEGARIO MAGALLANES,  BRIAN  Caregiver           [address]

[place]  [address]                      [phone]

 

                    OLEGARIO MAGALLANES,  BRIAN  Caregiver           [address] [address]  [address]                      [phone]

 

                    OLEGARIO MAGALLANES,  BRIAN  Caregiver           [address]  [address]                      [phone]

 

                    OLEGARIO MAGALLANES,  BRIAN  Caregiver           [address]  [address]                      [phone]

 

                    ROBIN MAGALLANES, JANNY JEWELL Caregiver           [address]

[place]  [address]                 Unavailable

 

                    SAGAR PEÑA       Next Of Kin         [address] [place]  [address]                      [phone]

 

                    KRISTAL MAGALLANES, T ADELAIDA Caregiver           [address]

[place]  [address]                 Unavailable

 

                    BRIAN MELVIN MD  Caregiver           [address]

[place]  [address]                      [phone]

 

                    TREVOR RODRÍGUEZ MD Caregiver           [address]

[place]  [address]                      Unavailable

 

                    MICHAELLE MALDONADO MD Caregiver           [address]

[place]  [address]                 Unavailable

 

                    MARCOS MAGALLANES, TREVOR RANDHAWA Caregiver           [address]

[place], [place]  [address] [phone]

 

                    MD BRIAN MELVIN  PRS                 [address] [place]  [address]                      [phone]

 

                    DO KIMANI APONTE   PRS                 [address]

[place]  [address]                       [phone]







Care Team Providers





                    Care Team Member Name Role                Phone

 

                    MD BRIAN MELVIN MD PCP                 [phone]

 

                    BRIAN MELVIN     Attphys             Unavailable

 

                    BRIAN MELVIN     Admphys             Unavailable







Payers





           Payer Name Policy Type Policy Number Effective Date Expiration Date S

ource [employer] Medicare Complete            [id number]  2018 00:00:00     

       [facility] [employer] Medicare Complete            [id number]  2018 00:00:00     

       [facility] [employer] Medicare Complete            [id number]  2018 00:00:00     

       [facility] [employer] Medicare Complete            [id number]  2018 00:00:00     

       [facility] [employer] Medicare Complete            [id number]  2018 00:00:00     

       [facility]







Advance Directives





           Directive  Decision   Effective Date Termination Date Comments   Sour

ce

 

           Yes        N/A                                         [facility]







Problems





           Condition Name Condition Details Condition Category Status     Onset 

Date Resolution

Date            Last Treatment Date Treating Clinician Comments        Source

 

                          Chronic obstructive pulmonary disease COPD (chronic ob

structive pulmonary 

disease) Problem Active                                          [facility]

 

       Hypoxia Hypoxia Problem Active                                    [facility]

 

       Respiratory distress Respiratory distress Problem Active                 

                   [facility]

 

        Congestive heart failure CHF (congestive heart failure) Problem Active  

                                 

                                        [facility]

 

                          Respiratory failure with hypoxia and hypercapnia Respi

ratory failure with 

hypoxia and hypercapnia Problem Active                                          

[facility]







Allergies, Adverse Reactions, Alerts





        Allergy Name Allergy Type Status  Severity Reaction(s) Onset Date Inacti

ve Date 

Treating Clinician        Comments                  Source

 

          Rivaroxaban Propensity to adverse reactions Active    Severe          

    2019 00:00:00 

                                                            [facility]

 

           Sulfa (Sulfonamide Antibiotics) Allergy to substance Active     Moder

ate              

2018 00:00:00                                                 [facility]

 

       Naproxen Allergy to substance Active Moderate        2018 00:00:00 

                     [facility]

 

           Penicillin Allergy to substance Active                ITCHING, ANAPHY

LAXIS 2018 

00:00:00                                                        [facility]







Social History





           Social Habit Start Date Stop Date  Quantity   Comments   Source

 

           Sex Assigned At Birth 1960 00:00:00 1960 00:00:00 Female 

               [facility]







Medications





             Ordered Medication Name Filled Medication Name Start Date   Stop Da

te    Current 

Medication? Ordering Clinician Indication Dosage     Frequency  Signature (SIG) 

Comments                  Components                Source

 

     Prednisone Prednisone 2020 10:16:00      Yes            10           

            [facility]

 

     Bumetanide Bumetanide 2020 10:12:00      Yes            2            

            [facility]

 

                          Guaifenesin/Dextromethorphan (Mucinex Dm Er 600-30 Mg 

Tablet) 1 Each TAB.ER.12H 

Guaifenesin/Dextromethorphan (Mucinex Dm Er 600-30 Mg Tablet) 1 Each TAB.ER.12H 

2020 10:10:00        Yes                  1                               

   [facility]

 

                    Levofloxacin (Levaquin) 500 Mg TABLET Levofloxacin (Levaquin

) 500 Mg TABLET 

2020 10:10:00        Yes                  500                             

   [facility]

 

                    Albuterol/Ipratropium Nebulize Albuterol/Ipratropium Nebuliz

e 2020 

08:16:00        Yes                  3                                  [facility]

 

     Metoclopramide Hcl Metoclopramide Hcl 2020 08:16:00      Yes         

   10                       

[facility]

 

     Calcium Carbonate Calcium Carbonate 2019 06:07:00      Yes           

 500                      

[facility]

 

                          Pantoprazole Sodium (Protonix) 40 Mg TABLET. Pantopr

azole Sodium (Protonix) 40

Mg TABLET. 2019 06:07:00       Yes               40                     

       [facility]

 

     [facility] 06:[facility]00 2020 00:00:00 No           

  1                        

[facility]

 

                    Amlodipine Besylate (Norvasc) 10 Mg TAB Amlodipine Besylate 

(Norvasc) 10 Mg TAB 

2019 06:07:00 2020 00:00:00 No                   10                 

                [facility]

 

     Prednisone Prednisone 2019 06:07:00 2020 00:00:00 No           

  10                       

[facility]

 

                          Guaifenesin/Dextromethorphan (Mucinex Dm Er 600-30 Mg 

Tablet) 1 Each TAB.ER.12H 

Guaifenesin/Dextromethorphan (Mucinex Dm Er 600-30 Mg Tablet) 1 Each TAB.ER.12H 

2018 10:47:00 2019 00:00:00 No                   1                  

                [facility]

 

     Prednisone Prednisone 2018 10:47:00 2019 00:00:00 No           

  10                       

[facility]

 

                          Nifedipine (Nifedipine Er) 30 Mg TAB.ER.24 Nifedipine 

(Nifedipine Er) 30 Mg 

TAB.ER.24 2018 11:03:00 2019 00:00:00 No                90          

                  [facility]

 

                          Multivit With Calcium,Iron,Min (Multiple Vitamins For 

Women) 1 Each TABLET 

Multivit With Calcium,Iron,Min (Multiple Vitamins For Women) 1 Each TABLET 

2018 11:46:00        Yes                  1                               

   [facility]

 

                    Zinc Sulfate (Zinc-220) 220 Mg CAPSULE Zinc Sulfate (Zinc-22

0) 220 Mg CAPSULE 

2018 11:46:00 2020 00:00:00 No                   1                  

                [facility]

 

     Apixaban Apixaban 2018 11:45:00 2020 00:00:00 No             5 

                       [facility]

 

        Magnesium Oxide Magnesium Oxide 2018 11:45:00 2019 00:00:00 

No                      

400                                                              [facility]

 

        Amiodarone Hcl Amiodarone Hcl 2018 11:45:00 2019 00:00:00 No

                      200

                                                                 [facility]

 

        Ascorbic Acid Ascorbic Acid 2018 11:45:00 2019 00:00:00 No  

                    500      

                                                                [facility]

 

                          Balsam Peru/Castor Oil (Venelex Ointment) 60 Gm OINT..

.G. Balsam [place] Oil

(Venelex Ointment) 60 Gm OINT...G. 2018 11:45:00 2019 00:00:00 No   

                     

           60                                                     [facility]

 

                          Calcium Carbonate/Vitamin D3 (Calcium 500 + D Tablet) 

1 Each TABLET Calcium 

Carbonate/Vitamin D3 (Calcium 500 + D Tablet) 1 Each TABLET 2018 11:45:00 

      

2019 00:00:00 No                      1                                   

    [facility]

 

                Nystatin/Triamcinolone Nystatin/Triamcinolone 2018 11:45:0

0 2019 

00:00:00 No                      0                                       [facility]

 

      Apixaban (Eliquis) 5 Mg TABLET Apixaban (Eliquis) 5 Mg TABLET             

Yes               5                 

                                                    [facility]

 

     Atorvastatin Calcium Atorvastatin Calcium           Yes            40      

                 [facility]

 

      Diazepam (Valium) 5 Mg TABLET Diazepam (Valium) 5 Mg TABLET             Ye

s               5                       

                                        [facility]

 

     Duoneb Duoneb           Yes            3                        [facility]

 

     Epipen Epipen           Yes            1                        [facility]

 

                          Estrogens Conjugated (Premarin) 0.625 Mg TAB Estrogens

 Conjugated (Premarin) 

0.625 Mg TAB             Yes               .625                          [facility]

 

       Ezetimibe (Zetia) 10 Mg TABLET Ezetimibe (Zetia) 10 Mg TABLET            

   Yes                  10             

                                                            [facility]

 

                          Fluticasone/Salmeterol (Advair 250-50 Diskus) 1 Each D

ISK.W.DEV 

Fluticasone/Salmeterol (Advair 250-50 Diskus) 1 Each DISK.W.DEV                 

Yes                     1               

                                                            [facility]

 

                          Hydrocodone Bit/Acetaminophen (Norco  Tablet) 1 

Each TABLET Hydrocodone 

Bit/Acetaminophen (Norco  Tablet) 1 Each TABLET             Yes           

    1                             [facility]

 

                          Insulin Detemir (Levemir) 100 Unit/1 Ml VIAL Insulin D

etemir (Levemir) 100 

Unit/1 Ml VIAL             Yes               15                            [facility]

 

     Metoprolol Tartrate Metoprolol Tartrate           Yes            25        

               [facility]

 

     Nystatincream Nystatincream           Yes            1                     

   [facility]

 

             Sertraline Hcl (Zoloft) 50 Mg TABLET Sertraline Hcl (Zoloft) 50 Mg 

TABLET                           

Yes                     100                                     [facility]

 

                          Sumatriptan Succinate (Imitrex) 25 Mg TABLET Sumatript

an Succinate (Imitrex) 25 

Mg TABLET             Yes               25                            [facility]

 

     Theophylline Anhydrous Theophylline Anhydrous           Yes            300 

                     [facility]

 

                          Tiotropium Bromide (Spiriva) 18 Mcg CAP.W.DEV Tiotropi

um Bromide (Spiriva) 18 

Mcg CAP.W.DEV             Yes               18                            [facility]

 

                Furosemide (Lasix) 40 Mg TABLET Furosemide (Lasix) 40 Mg TABLET 

                2019 

00:00:00 No                      40                                      [facility]

 

     Omeprazole Omeprazole      2019 00:00:00 No             40           

            [facility]

 

                Diazepam (Valium) 5 Mg TABLET Diazepam (Valium) 5 Mg TABLET     

            2019 

00:00:00 No                      5                                       [facility]

 

                          Hydrocodone Bit/Acetaminophen (Norco  Tablet) 1 

Each TABLET Hydrocodone 

Bit/Acetaminophen (Norco  Tablet) 1 Each TABLET                 2019

 00:00:00 No               

                    1                                                 [facility]

 

                Azithromycin (Z-Momo) 250 Mg TABLET Azithromycin (Z-Momo) 250 Mg T

ABLET                 

2018 00:00:00 No                      250                                 

    [facility]

 

                          Sulfamethoxazole/Trimethoprim (Bactrim Ds Tablet) 1 Ea

ch TABLET 

Sulfamethoxazole/Trimethoprim (Bactrim Ds Tablet) 1 Each TABLET                 

          2018 

00:00:00 No                      1                                       [facility]







Vital Signs





             Vital Name   Observation Time Observation Value Comments     Source

 

             Body Temperature 2020 11:45:00 96.7 [degF]               [facility]

 

             Weight       2020 18:02:00 294 [lb_av]               [facility]

 

             BMI (Body Mass Index) 2020 18:02:00 53.8 kg/m2               

 [facility]







Procedures





                Procedure       Date / Time Performed Performing Clinician Sourc

e

 

                Ultrasound, renal 2020 00:00:00                 [facility]

 

                Computed tomography of chest without contrast 2020 00:00:0

0                 [facility]

 

                US Abdomen limited 2020 00:00:00                 [facility]

[facility]

 

                ASSISTANCE WITH RESPIRATORY VENTILATION, <24 HRS, CPAP 2020

9 00:00:00                 [facility]

 

                X-ray of chest, two views 2020 00:00:00                 [facility]

 

                X-ray of chest, two views 2019 00:00:00 YORDAN SOTO CH

[facility]

 

                Computed tomography of chest without contrast 2019 00:00:0

0 YORDAN SOTO    

[facility]

 

                X-ray of chest, two views 2019-12-15 00:00:00 YORDAN SOTO CH

[facility]

 

                Ultrasound of chest including mediastinum 2019 00:00:00 YORDAN MARQUEZ    [facility]







Plan of Care





             Planned Activity Planned Date Details      Comments     Source

 

             Future Scheduled Test              Gram stain microscopy [code = 66

4-3]              [facility]

 

                    Future Scheduled Test                     Bacteria identific

ation in sputum by respiratory culture

[code = 624-7]                                      [facility]

 

             Goal                      Patient referral [code = 6357556 ]       

       [facility]

 

             Goal                      Patient referral [code = 7494561 ]       

       [facility]

 

             Goal                      Patient referral [code = 3864314 ]       

       [facility]

 

             Goal                      Patient referral [code = 3612445 ]       

       [facility]

 

             Goal                      Patient referral [code = 6698064 ]       

       [facility]

 

             Goal                      Patient referral [code = 0866770 ]       

       [facility]

 

             Goal                      Patient referral [code = 2644421 ]       

       [facility]

 

             Goal                      Patient referral [code = 3521008 ]       

       [facility]

 

             Goal                      Patient referral [code = 1563888 ]       

       [facility]

 

             Instructions              COPD                      [facility]

 

             Instructions              Congestive Heart Failure              [facility]







Encounters





             Start Date/Time End Date/Time Encounter Type Admission Type Attendi

[facility]   Care Department Encounter ID    Source

 

           2020 12:18:00 2020 16:51:00 Discharged Inpatient 1       

   BRIAN MELVIN 

[facility] [id number]        [facility]

 

           2020 14:50:00 2020 12:40:00 Discharged Inpatient 1       

   BRIAN MELVIN 

[facility] [id number]        [facility]

 

           2020 10:51:00 2020 14:42:00 Discharged Inpatient 1       

   BRIAN MELVIN 

[facility] [id number]        [facility]

 

           2019 22:17:00 2019 17:08:00 Discharged Inpatient 1       

   BRIAN MELVIN 

[facility] [id number]        [facility]

 

           2019 15:13:00 2019 17:55:00 Discharged Inpatient 1       

   BRIAN MELVIN 

[facility]              [id number]        [facility]

 

           2018 01:37:00 2018 15:40:00 Discharged Inpatient ER      

   BRIAN MELVIN 

[facility]              [id number]        [facility]

 

           2018 22:05:00 2018 21:50:00 Discharged Inpatient ER      

   BRIAN MELVIN 

[facility]              [id number]        [facility]

 

           2018 11:57:00 2018 16:52:00 Discharged Inpatient ER      

   BRIAN MELVIN 

[facility]              [id number]        [facility]







Results





           Test Description Test Time  Test Comments Results    Result Comments 

Source

 

                          Capillary blood glucose measurement by glucometer (mas

s/volume) 2020 

11:29:00                                  

 

                                        Test Item

 

             Bedside Glucose (test code = 66795-7) 238                          

           





[facility] 
(number/volume)2020 05:10:00* 



             Test Item    Value        Reference Range Interpretation Comments

 

             White Blood Count (test code = 6690-2) 15.28                       

            





[facility] automated count 
(number/volume)2020 05:10:00* 



             Test Item    Value        Reference Range Interpretation Comments

 

             Red Blood Count (test code = 789-8) 3.41                           

         





[facility] measurement 
(moles/volume)2020 05:10:00* 



             Test Item    Value        Reference Range Interpretation Comments

 

             Hemoglobin (test code = 70715-8) 9.8                               

      





[facility] hematocrit (volume 
fraction)2020 05:10:00* 



             Test Item    Value        Reference Range Interpretation Comments

 

             Hematocrit (test code = 4544-3) 32.8                               

     





[facility] erythrocyte mean corpuscular 
[id number] 05:10:00* 



             Test Item    Value        Reference Range Interpretation Comments

 

             Mean Corpuscular Volume (test code = 787-2) 96.2                   

                 





[facility] erythrocyte mean corpuscular 
hemoglobin (mass per erythrocyte)2020 05:10:00* 



             Test Item    Value        Reference Range Interpretation Comments

 

             Mean Corpuscular Hemoglobin (test code = 785-6) 28.7               

                     





[facility] TaylorAutomated erythrocyte mean corpuscular 
hemoglobin concentration measurement (mass/volume)2020 05:10:00* 



             Test Item    Value        Reference Range Interpretation Comments

 

             Mean Corpuscular Hemoglobin Concent (test code = 786-4) 29.9       

                             





[facility] ZbqXi-Xby4866-62-06 05:10:00* 



             Test Item    Value        Reference Range Interpretation Comments

 

             Red Cell Distribution Width (test code = 66774-8) 14.2             

                       





[facility] TaylorAutomated blood platelet count 
(count/volume)2020 05:10:00* 



             Test Item    Value        Reference Range Interpretation Comments

 

             Platelet Count (test code = 777-3) 249                             

        





[facility] neutrophil 
count as percentage of total xqdkaxbrqd4889-02-23 05:10:00* 



             Test Item    Value        Reference Range Interpretation Comments

 

             Neutrophils (%) (Auto) (test code = 84671-8) 73.6                  

                  





[facility] TaylorAutUNC [facility]enoired blood lymphocyte count as 
percentage ot total eiwthgddpy1926-45-47 05:10:00* 



             Test Item    Value        Reference Range Interpretation Comments

 

             Lymphocytes (%) (Auto) (test code = 736-9) 16.2                    

                





[facility] TaylorAutomated blood monocyte count as 
percentage of total drwucnmmdy9720-87-41 05:10:00* 



             Test Item    Value        Reference Range Interpretation Comments

 

             Monocytes (%) (Auto) (test code = 5905-5) 5.6                      

               





[facility] TaylorAutomated blood eosinophil count as 
percentage of total xsgykyywpo6476-12-70 05:10:00* 



             Test Item    Value        Reference Range Interpretation Comments

 

             Eosinophils (%) (Auto) (test code = 713-8) 0.5                     

                





[facility] TaylorAutomat blood basophil count as 
percentage of total oxzqtctwdy2446-55-39 05:10:00* 



             Test Item    Value        Reference Range Interpretation Comments

 

             Basophils (%) (Auto) (test code = 706-2) 0.5                       

              





[facility] TaylorFluoroscopic procedure less than one hour
bcbpwegq7898-48-90 05:10:00* 



             Test Item    Value        Reference Range Interpretation Comments

 

             IM GRANULOCYTES % (test code = IM GRANULOCYTES %) 3.6              

                       





[facility] TaylorAutomated blood neutrophil count
2020 05:10:00* 



             Test Item    Value        Reference Range Interpretation Comments

 

             Neutrophils # (Auto) (test code = 751-8) 11.2                      

              





[facility] TaylorBlood lymphocytes count (number/volume)
2020 05:10:00* 



             Test Item    Value        Reference Range Interpretation Comments

 

             Lymphocytes # (Auto) (test code = 91851-5) 2.5                     

                





[facility] monocytes automated count 
(number/volume)2020 05:10:00* 



             Test Item    Value        Reference Range Interpretation Comments

 

             Monocytes # (Auto) (test code = 742-7) 0.9                         

            





[facility] TaylorAutomated blood eosinophil count
2020 05:10:00* 



             Test Item    Value        Reference Range Interpretation Comments

 

             Eosinophils # (Auto) (test code = 711-2) 0.1                       

              





[facility] blood basophil count 
(count/volume)2020 05:10:00* 



             Test Item    Value        Reference Range Interpretation Comments

 

             Basophils # (Auto) (test code = 704-7) 0.1                         

            





[facility] TaylorFluoroscopic procedure less than one hour
lqyrnxhw8035-64-28 05:10:00* 



             Test Item    Value        Reference Range Interpretation Comments

 

                                        Absolute Immature Granulocyte (auto (juan

t code = Absolute Immature Granulocyte 

(auto)          0.55                                             





[facility] Uptownerum or plasma sodium measurement 
(moles/volume)2020 05:10:00* 



             Test Item    Value        Reference Range Interpretation Comments

 

             Sodium Level (test code = 2951-2) 142                              

       





[facility] Uptownerum or plasma potassium measurement 
(moles/volume)2020 05:10:00* 



             Test Item    Value        Reference Range Interpretation Comments

 

             Potassium Level (test code = 2823-3) 4.6                           

          





[facility] Uptownerum or plasma chloride measurement 
(moles/volume)2020 05:10:00* 



             Test Item    Value        Reference Range Interpretation Comments

 

             Chloride Level (test code = 2075-0) 100                            

         





[facility] Uptownerum or plasma carbon dioxide, total 
measurement (moles/volume)2020 05:10:00* 



             Test Item    Value        Reference Range Interpretation Comments

 

             Carbon Dioxide Level (test code = 2028-9) 35                       

               





[facility] Uptownerum or plasma anion jqk5611-81-82 
05:10:00* 



             Test Item    Value        Reference Range Interpretation Comments

 

             Anion Gap (test code = 33037-3) 11.6                               

     





[facility] Uptownerum or plasma urea nitrogen measurement
(mass/volume)2020 05:10:00* 



             Test Item    Value        Reference Range Interpretation Comments

 

             Blood Urea Nitrogen (test code = 3094-0) 60                        

              





[facility] Uptownerum or plasma creatinine measurement 
(mass/volume)2020 05:10:00* 



             Test Item    Value        Reference Range Interpretation Comments

 

             Creatinine (test code = 2160-0) 2.08                               

     





[facility] Uptownerum or plasma urea nitrogen/creatinine 
mass kwgje2010-90-03 05:10:00* 



             Test Item    Value        Reference Range Interpretation Comments

 

             BUN/Creatinine Ratio (test code = 3097-3) 29                       

               





[facility] TaylorEstimated glomerular filtration rate 
(GFR) ndcgpxdapweiy0550-63-06 05:10:00* 



             Test Item    Value        Reference Range Interpretation Comments

 

             Estimat Glomerular Filtration Rate (test code = 804899828) 24      

                                





[facility] TaylorGlucose sdbucxydova7550-74-77 05:10:00* 



             Test Item    Value        Reference Range Interpretation Comments

 

             Glucose Level (test code = FWZ4530) 77                             

         





[facility] Uptownerum or plasma calcium measurement 
(mass/volume)2020 05:10:00* 



             Test Item    Value        Reference Range Interpretation Comments

 

             Calcium Level (test code = 70402-3) 9.2                            

         





[facility] Uptownerum or plasma magnesium measurement 
(mass/volume)2020 05:10:00* 



             Test Item    Value        Reference Range Interpretation Comments

 

             Magnesium Level (test code = 39276-3) 2.1                          

           





[facility] Uptownerum or plasma total bilirubin 
measurement (mass/volume)2020 05:10:00* 



             Test Item    Value        Reference Range Interpretation Comments

 

             Total Bilirubin (test code = 1975-2) 0.2                           

          





[facility] TaylorFluoroscopic procedure less than one hour
atzoxzus3415-74-43 05:10:00* 



             Test Item    Value        Reference Range Interpretation Comments

 

                                        Aspartate Amino Transf (AST/SGOT) (test 

code = Aspartate Amino Transf 

(AST/SGOT))     14                                               





[facility] Uptownerum or plasma alanine aminotransferase 
measurement (enzymatic activity/volume)2020 05:10:00* 



             Test Item    Value        Reference Range Interpretation Comments

 

             Alanine Aminotransferase (ALT/SGPT) (test code = 1742-6) 17        

                              





[facility] Uptownerum or plasma protein measurement 
(mass/volume)2020 05:10:00* 



             Test Item    Value        Reference Range Interpretation Comments

 

             Total Protein (test code = 2885-2) 6.5                             

        





[facility]ptownerum or plasma albumin measurement 
(mass/volume)2020 05:10:00* 



             Test Item    Value        Reference Range Interpretation Comments

 

             Albumin (test code = 1751-7) 3.1                                   

  





[facility] TaylorPlasma globulin measurement (mass/volume)
2020 05:10:00* 



             Test Item    Value        Reference Range Interpretation Comments

 

             Globulin (test code = 53518-3) 3.4                                 

    





[facility] Uptownerum or plasma albumin/globulin mass 
lshwi4995-63-75 05:10:00* 



             Test Item    Value        Reference Range Interpretation Comments

 

             Albumin/Globulin Ratio (test code = 1759-0) 0.9                    

                 





[facility] Uptownerum or plasma alkaline phosphatase 
measurement (enzymatic activity/volume)2020 05:10:00* 



             Test Item    Value        Reference Range Interpretation Comments

 

             Alkaline Phosphatase (test code = 6768-6) 90                       

               





[facility] TaylorUrine protein measurement (mass/volume)
2020 23:50:00* 



             Test Item    Value        Reference Range Interpretation Comments

 

             Urine Random Total Protein (test code = 2888-6) 42.1               

                     





[facility] TaylorUrine creatinine measurement 
(mass/volume)2020 23:50:00* 



             Test Item    Value        Reference Range Interpretation Comments

 

             Urine Creatinine (test code = 2161-8) 57.58                        

           





[facility] TaylorRandom urine protein/creatinine ratio
2020 23:50:00* 



             Test Item    Value        Reference Range Interpretation Comments

 

             Urine Protein/Creatinine Ratio (test code = 64644-6) 0.00          

                          





[facility] TaylorUS RENAL RETROPERITONEAL [id number] 
14:53:00                                                                        
             [facility]                        [address] [place] [address]      Patient Name: MARGY PEÑA                                MR #: [id number]                  : 
1960                                   Age/Sex: 59/F  Acct #: [id number]
                             Req #: [id number]  Adm Physician: BRIAN MELVIN MD 
                                   Ordered by: RODNEY MAGALLANES, GUERO MAGALLANES               
         Report #: [id number]        Location: MED[facility]                         
     Room[facility]Bed: [facility]             
__________________________________________________________
_________________________________________    Procedure: [id number] US/US RENAL RE
TROPERITONEAL COMP  Exam Date: 20                            Exam Time: 14
11                                              REPORT STATUS: Signed    EXAM:  
US RENAL RETROPERITONEAL COMP      DATE: 2020 1:41 PM        INDICATION: Acu
te kidney injury        COMPARISON: Limited abdominal ultrasound from 3/30/2020 
    FINDINGS:   The right kidney is normal in size measuring 10.6 x 5.5 x 4.6 cm
with cortical   thickness of 1.4 cm. Cortical echogenicity is within normal li
mits. There is no   evidence for solid renal mass, hydronephrosis, or shadowing 
calculi.      The left kidney is normal in size measuring 9.8 x 5.9 x 4.1 cm wit
h cortical   thickness of 1.5 cm. Cortical echogenicity is within normal limits.
There is no   evidence for solid renal mass, hydronephrosis, or shadowing calcu
li.      The urinary bladder is only partially distended. Prevoid volume is 57 c
c.         IMPRESSION:      Unremarkable sonographic appearance of the kidneys. 
    Signed by: Dr. Ricardo Bishop MD on 2020 2:54 PM        Dictated By: RICARDO BISHOP MD  Electronically Signed By: RICARDO BISHOP MD on 20 4305  Transc
ribed By: RICKY on 20 1452       COPY TO:   GUERO STEVENS         
Phosphorus izqzixkklcb6285-54-38 05:35:00* 



             Test Item    Value        Reference Range Interpretation Comments

 

             Phosphorus Level (test code = AQO4714) 4.5                         

            





[facility]ptownerum or plasma creatine kinase 
measurement (enzymatic activity/volume)2020 05:35:00* 



             Test Item    Value        Reference Range Interpretation Comments

 

             Creatine Kinase (test code = 2157-6) 47                            

          





[facility] Uptownerum or plasma creatine kinase MB 
measurement (mass/volume)2020 05:35:00* 



             Test Item    Value        Reference Range Interpretation Comments

 

             Creatine Kinase MB (test code = 75215-5) 7.30                      

              





[facility] TaylorTroponin I measurement by highly 
sensitive enzyme kxqgdpgwzlw5693-47-48 05:35:00* 



             Test Item    Value        Reference Range Interpretation Comments

 

             Troponin I (test code = 39446-3) 0.023                             

      





[facility] Uptownerum or plasma iron measurement 
(mass/volume)2020 05:00:00* 



             Test Item    Value        Reference Range Interpretation Comments

 

             Iron Level (test code = 2498-4) 44                                 

     





[facility] Uptownerum or plasma iron binding capacity 
measurement (mass/volume)2020 05:00:00* 



             Test Item    Value        Reference Range Interpretation Comments

 

             Total Iron Binding Capacity (test code = 2500-7) 389               

                      





[facility] Uptownerum or plasma iron saturation 
measurement (mass fraction)2020 05:00:00* 



             Test Item    Value        Reference Range Interpretation Comments

 

             Percent Iron Saturation (test code = 2502-3) 11                    

                  





[facility] Uptownerum or plasma transferrin measurement 
(mass/volume)2020 05:00:00* 



             Test Item    Value        Reference Range Interpretation Comments

 

             Transferrin (test code = 3034-6) 278                               

      





[facility] TaylorFluoroscopic procedure less than one hour
opmgqozn4091-01-00 14:34:00* 



             Test Item    Value        Reference Range Interpretation Comments

 

             Coronavirus (PCR) (test code = Coronavirus (PCR)) NOT DETECTED     

                       





[facility] Taylor[facility])2020 
14:05:00                                                                        
             [place] [address]      Patient Name: MARGY PEÑA                                MR #: [id number]                  : 
1960                                   Age/Sex: 59/F  Acct #: [id number]
                             Req #: [id number]  Adm Physician:                  
                                   Ordered by: KIMANI APONTE DO                 
       Report #: [id number]        Location: ER                                  
   Room/Bed:                   
____________________________________________________________
_______________________________________    Procedure: [id number] DX/CHEST SINGLE 
(PORTABLE)  Exam Date: 20                            Exam Time: 1312      
                                       REPORT STATUS: Signed    EXAMINATION:  C
HEST SINGLE (PORTABLE)          INDICATION: Shortness of breath      COMPARISON:
Chest radiograph 3/30/2020, chest CT 3/31/2020           FINDINGS:      LINES/T
UBES:None      LUNGS:The lungs are moderately inflated. There is perihilar fulln
ess and   indistinctness of the pulmonary vasculature. Right basilar patchy opac
ities.      PLEURA:No pleural effusion or pneumothorax.      MEDIASTINUM:Cardiom
ediastinal silhouette is stably enlarged.      BONES/SOFT TISSUES:No acute osseo
us injury.      ABDOMEN:No free air under the diaphragm.         IMPRESSION:    
Cardiomegaly and pulmonary interstitial edema.      Right basilar patchy opaciti
es, most likely subsegmental atelectasis.      Signed by: Fritz Esparza MD on 2020 2:06 PM        Dictated By: FRITZ ESPARZA MD  Electronically Signed By: ELENA ESPARZA MD on 20  Transcribed By: RICKY on 20       COPY TO
:   KIMANI APONTE DO         Urine color byyonxhmhlkpq3624-10-04 12:51:00* 



             Test Item    Value        Reference Range Interpretation Comments

 

             Urine Color (test code = 5778-6) YELLOW                            

      





[facility] TaylorUrine aqjuego6228-51-50 12:51:00* 



             Test Item    Value        Reference Range Interpretation Comments

 

             Urine Clarity (test code = 09107-5) CLEAR                          

         





[facility] Uptownpecific gravity of Urine by Test strip
2020 12:51:00* 



             Test Item    Value        Reference Range Interpretation Comments

 

             Urine Specific Gravity (test code = 5811-5) 1.015                  

                 





[facility] TaylorUrine pH measurement by automated test 
bsfev9680-26-50 12:51:00* 



             Test Item    Value        Reference Range Interpretation Comments

 

             Urine pH (test code = 37043-0) 5.5                                 

    





[facility] TaylorUrine leukocyte esterase detection by 
panctxup9076-41-09 12:51:00* 



             Test Item    Value        Reference Range Interpretation Comments

 

             Urine Leukocyte Esterase (test code = 5799-2) NEGATIVE             

                   





[facility] TaylorUrine nitrite lydotgkqz2242-71-88 
12:51:00* 



             Test Item    Value        Reference Range Interpretation Comments

 

             Urine Nitrite (test code = 97029-8) NEGATIVE                       

         





[facility] TaylorU[facility] protein measurement by test strip 
(mass/volume)2020 12:51:00* 



             Test Item    Value        Reference Range Interpretation Comments

 

             Urine Protein (test code = 5804-0) 1+                              

        





[facility] TaylorUrine glucose unimkdvmz6371-39-34 
12:51:00* 



             Test Item    Value        Reference Range Interpretation Comments

 

             Urine Glucose (UA) (test code = 2349-9) NEGATIVE                   

             





[facility] TaylorUrine ketones detection by automated test
uwupt2882-78-46 12:51:00* 



             Test Item    Value        Reference Range Interpretation Comments

 

             Urine Ketones (test code = 91821-6) NEGATIVE                       

         





[facility] TaylorUrine urobilinogen measurement by test 
strip (mass/volume)2020 12:51:00* 



             Test Item    Value        Reference Range Interpretation Comments

 

             Urine Urobilinogen (test code = 30796-1) 0.2                       

              





[facility] TaylorUrine total bilirubin measurement 
(mass/volume)2020 12:51:00* 



             Test Item    Value        Reference Range Interpretation Comments

 

             Urine Bilirubin (test code = 1978-6) NEGATIVE                      

          





[facility] TaylorUrine erythrocytes sfgfqtfla8866-14-93 
12:51:00* 



             Test Item    Value        Reference Range Interpretation Comments

 

             Urine Blood (test code = 23999-4) TRACE                            

       





[facility] TaylorAutomated urine sediment leukocyte count 
by microscopy (number/high power field)2020 12:51:00* 



             Test Item    Value        Reference Range Interpretation Comments

 

             Urine WBC (test code = 5821-4) 0-5                                 

    





[facility] TaylorErythrocytes detection in urine sediment 
by light unzsbklrwb3512-90-56 12:51:00* 



             Test Item    Value        Reference Range Interpretation Comments

 

             Urine RBC (test code = 14938-7) 0-5                                

     





[facility] TaylorBacteria detection in urine sediment by 
light agyjbmazxb3641-20-90 12:51:00* 



             Test Item    Value        Reference Range Interpretation Comments

 

             Urine Bacteria (test code = 45043-7) FEW                           

          





[facility] TaylorEpithelial cells detection in urine 
sediment by light oeojmarvwh0285-44-21 12:51:00* 



             Test Item    Value        Reference Range Interpretation Comments

 

             Urine Epithelial Cells (test code = 71026-5) FEW                   

                  





[facility] TaylorYeast detection in urine sediment by 
light hxpyoowdyy4844-95-68 12:51:00* 



             Test Item    Value        Reference Range Interpretation Comments

 

             Urine Yeast (test code = 78513-6) FEW                              

       





[facility] TaylorBNP Bld-sJye4508-81-09 12:51:00* 



             Test Item    Value        Reference Range Interpretation Comments

 

             B-Type Natriuretic Peptide (test code = 37261-6) 456.6             

                      





[facility] Uptownerum or plasma theophylline measurement 
(mass/volume)2020 12:51:00* 



             Test Item    Value        Reference Range Interpretation Comments

 

             Theophylline Level (test code = 4049-3) 5.9                        

             





[facility] TaylorBlood hhwtdsy9689-10-79 12:51:00* 



             Test Item    Value        Reference Range Interpretation Comments

 

             Blood Culture (test code = 90846421) NO GROWTH AFTER 48 HOURS      

                      





[facility] TaylorHEPTOBILIARY W CKHON0481-96-50 16:48:00  
                                                                                
  [facility]                        [address] [place] [address]      Patient Name: MARGY PEÑA           
                       MR #: [id number]                     : 1960     
                             Age/Sex: 59/F  Acct #: [id number]                
             Req #: [id number]  Adm Physician: BRIAN MELVIN MD                 
                    Ordered by: YORDAN HWANG MD                            
Report #: [id number]        Location: [facility]                                    
Room/Bed: [place] [address]          _________________________________________________
__________________________________________________    Procedure: [id number] NM/HE
PTOBILIARY W PHARM  Exam Date:                             Exam Time:           
                                   REPORT STATUS: Signed    Hepatobiliary Scan 
with Gallbladder Ejection Fraction      Clinical information: RUQ pain      Repo
rt: Following intravenous administration of 6.6 millicuries of Tc-99m   mebrofen
in, dynamic images of the abdomen in the anterior projection were   obtained thr
ough 46 minutes.  Sincalide (CCK analog) 2.7 micrograms was   administered intra
venously over 30 minutes with additional imaging for   determination of gallblad
alma ejection fraction.      Perfusion to the liver is normal.  Extraction of tra
cer from the blood pool by   the liver parenchyma is normal.  Tracer is seen pro
mptly within the biliary   tract.  The gallbladder begins to fill by 20 minutes 
post-injection of tracer.    Tracer is seen in the small bowel by 14 minutes.  T
he gallbladder ejection   fraction with administration of sincalide is 96% (norm
al greater than 40%).      Impression:       1.  Filling of the gallbladder excl
udes the diagnosis of acute cystic duct   obstruction/acute cholecystitis.   2. 
Normal gallbladder ejection fraction of 96% does not support the clinical   zurdo
gnosis of chronic cholecystitis/gallbladder dyskinesia.      Signed by: Dr. Marlyn Hernandez M.D. on 3/31/2020 4:52 PM        Dictated By: RICA Hernandez
jennifer Signed By: RICA HERNANDEZ MD on 20  Transcribed By: RICKY on 1652       COPY TO:   YORDAN HWANG MD         CT CHEST [id number] 
12:21:00                                                                        
             [place] [address]      Patient Name: MARGY PEÑA                                   MR #: [id number]                     : 
1960                                   Age/Sex: 59/F  Acct #: [id number]
                             Req #: [id number]  Adm Physician: BRIAN MELVIN MD 
                                    Ordered by: NADIR ERIC MD             
              Report #: [id number]        Location: [facility]                         
          Room/Bed: [place] [address]          
_____________________________________________
______________________________________________________    Procedure: [id number] C
T/CT CHEST WO  Exam Date: 20                            Exam Time: 1153   
                                          REPORT STATUS: Signed    EXAM: CT Bozena
st WITHOUT intravenous contrast 3/31/2020 10:45 AM   INDICATION:  Pneumonia   CO
MPARISON: Chest radiograph 3/30/2020   TECHNIQUE:   Chest was scanned utilizing 
a multidetector helical scanner from the lung apex   through the level of the ad
renal glands without administration of IV contrast.   Coronal and sagittal refor
mations were obtained. Routine protocol was   performed.      IV CONTRAST: None 
 RADIATION DOSE: Total DLP: 911 mGy*cm. Dose modulation, iterative   reconstruc
tion, and/or weight based adjustment of the mA/kV was utilized to   reduce the r
adiation dose to as low as reasonably achievable.    COMPLICATIONS: None      FI
NDINGS:      LINES/ TUBES: None.      LUNGS AND AIRWAYS:  The central airways ar
e patent. Dependent right lower lobe   segmental atelectasis. Left lower lobe de
pendent subsegmental atelectasis. No   focal consolidation mild lower lobe predo
minant intralobular septal thickening   consistent with mild pulmonary and her s
ocial edema. No suspicious pulmonary   nodule.        PLEURA: The pleural spaces
are clear.      HEART AND MEDIASTINUM: Bilateral hypodense thyroid nodules. The 
largest nodule   measures up to 2.1 cm on the left.  No supraclavicular, axilla
ry, mediastinal,   or hilar lymphadenopathy.  Multichamber cardiomegaly. Small p
ericardial   effusion. Athetotic calcifications involve the coronary arteries, t
horacic   aorta, and proximal great vessels. The main pulmonary artery is enlarg
ed,   measuring up to 3.9 cm maximum diameter.      UPPER ABDOMEN: No acute find
ings.      BONES: The visualized bony thorax is within normal limits.      SOFT 
TISSUES: Unremarkable.      IMPRESSION:    Cardiomegaly and mild pulmonary inter
stitial edema.      Bilateral dependent lower lobe subsegmental atelectasis.    
  Enlargement of the main pulmonary artery can be seen with pulmonary arterial  
hypertension.          Signed by: Fritz Esparza MD on 3/31/2020 12:35 PM        
Dictated By: FRITZ ESPARZA MD  Electronically Signed By: FRITZ ESPARZA MD on 20 
1235  Transcribed By: RICKY on 20 1235       COPY TO:   NADIR ERIC MD         Serum or plasma lipase measurement (enzymatic activity/volume)
2020 05:42:00* 



             Test Item    Value        Reference Range Interpretation Comments

 

             Lipase (test code = 3040-3) 36                                     

 





[facility] [id number] 14:44:00    
                                                                                
[place] [address]      Patient Name: MARGY PEÑA           
                       MR #: [id number]                     : 1960     
                             Age/Sex: 59/F  Acct #: [id number]                
             Req #: [id number]  [facility] Physician: BRIAN MELVIN MD                 
                    Ordered by: Sagar Chan NP                            
Report #: [id number]        Location: [facility]                                    
Room/Bed: [place] [address]          ______________________________________________
_____________________________________________________    Procedure: [id number] US
/US ABDOMEN LIMITED  Exam Date: 20                            Exam Time: 1
349                                              REPORT STATUS: Signed    EXAM: 
Right Upper Quadrant Abdominal Ultrasound   INDICATION:            nausea, vomit
ing, r/o stones    [id number]    1349   COMPARISON: None.    TECHNIQUE: Transverse
and longitudinal images of the upper abdomen were   obtained. The technologist 
reports a technically limited study due to patient   body habitus.      FINDINGS
:        Liver:        Size: 16.8 cm in the right midclavicular line, mildly enl
arged        Appearance: Increased echogenicity, smooth contour        Mass: No 
focal masses      Gallbladder:        Stones/Sludge: Multiple echogenic gallston
es and sludge        Wall: 0.3 cm, upper limit of normal        Appearance: Cont
racted. No pericholecystic fluid or hydrops.          Sonographic Tsang's Sign:
Negative      Bile Ducts:        Intrahepatic Ducts: No dilatation        Extra
hepatic Ducts: Common bile duct measures 0.3 cm, no dilatation      Pancreas:   
    Not identified due to overlying bowel gas and patient body habitus      Rig
ht Kidney:        Size:  10.3 x 5.5 x 5.5 cm        Echogenicity:  Normal       
Parenchymal thickness: Normal         Collecting System:  No hydronephrosis     
  Stone:  None        Cyst/Mass: None                   Vessels:   Aorta and IVC
are poorly visualized due to overlying bowel gas and patient body   habitus     
Free Fluid:        No ascites or pleural effusion      IMPRESSION:      1. Mild 
hepatomegaly with fatty infiltration of the liver.      2. Cholelithiasis wi
thout other sonographic evidence of acute cholecystitis.      Signed by: Bean Pereira MD on 3/30/2020 2:51 PM        Dictated By: BEAN PEREIRA MD  [facility] Signed By: BEAN PEREIRA MD on 20 145  Transcribed By: RICKY on
20       COPY TO:   SAGAR CHAN NP         CHEST SINGLE (PORTABLE)
2020 12:23:00                                                             
                        [place] [address]      Patient Name: 
MARGY PEÑA                                   MR #: [id number]               
     : 1960                                   Age/Sex: 59/F  Acct #: 
[id number]                              Req #: [id number]  Adm Physician: 
BRIAN MELVIN MD                                      Ordered by: Sagar Chan NP                            Report #: [id number]        Location: [facility]       
                            Room/Bed: [place] [address]          
______________________________________________
_____________________________________________________    Procedure: [id number] DX
/CHEST SINGLE (PORTABLE)  Exam Date: 20                            Exam Ti
me: 1208                                              REPORT STATUS: Signed    E
XAMINATION:  CHEST SINGLE (PORTABLE)          INDICATION: CHF      COMPARISON: C
hest radiograph 3/29/2020           FINDINGS:      LINES/TUBES:EKG leads overlie
the chest.      LUNGS:The lung volumes are low. Unchanged bibasilar airspace op
acities. There   is perihilar fullness and indistinctness of the pulmonary vascu
lature.      PLEURA:Unchanged small bilateral pleural effusions. No pneumothorax
.      MEDIASTINUM:The cardiomediastinal silhouette appears unchanged in size an
d   shape.      BONES/SOFT TISSUES:No acute osseous injury.      ABDOMEN:No free
air under the diaphragm.         IMPRESSION:    No significant interval change. 
    Signed by: Fritz Esparza MD on 3/30/2020 12:24 PM        Dictated By: FRITZ ESPARZA MD  Electronically Signed By: FRITZ ESPARZA MD on 20 1224  Transcribed By:
RICKY on 20 1224       COPY TO:   SAGAR CHAN NP         Serum or 
plasma triglyceride measurement (mass/volume)2020 05:10:00* 



             Test Item    Value        Reference Range Interpretation Comments

 

             Triglycerides Level (test code = 2571-8) 93                        

              





[facility] cholesterol measurement 
(mass/volume)2020 05:10:00* 



             Test Item    Value        Reference Range Interpretation Comments

 

             Cholesterol Level (test code = 2093-3) 144                         

            





[facility] or plasma cholesterol in LDL 
measurement (mass/volume)2020 05:10:00* 



             Test Item    Value        Reference Range Interpretation Comments

 

             LDL Cholesterol (test code = 2089-1) 72                            

          





[facility] or plasma cholesterol in HDL 
measurement (mass/volume)2020 05:10:00* 



             Test Item    Value        Reference Range Interpretation Comments

 

             HDL Cholesterol (test code = 2085-9) 53                            

          





[facility] or plasma total 
cholesterol/cholesterol in HDL mass dffeq6412-08-67 05:10:00* 



             Test Item    Value        Reference Range Interpretation Comments

 

             Cholesterol/HDL Ratio (test code = 9830-1) 2.7                     

                





[facility] TaylorFree thyroxine zzidy4198-99-39 05:10:00* 



             Test Item    Value        Reference Range Interpretation Comments

 

             Free Thyroxine Index (test code = 59150-7) 3.8289                  

                





[facility] or plasma thyroxine (T4) 
measurement (mass/volume)2020 05:10:00* 



             Test Item    Value        Reference Range Interpretation Comments

 

             Thyroxine (T4) (test code = 3026-2) 12.30                          

         





[facility] or plasma triiodothyronine resin 
uptake (T3RU)2020 05:10:00* 



             Test Item    Value        Reference Range Interpretation Comments

 

             Triiodothyronine (T3) Uptake (test code = 3050-2) 31.13            

                       





[facility]um or plasma thyrotropin measurement 
by detection limit <= 0.005 miu/l (units/volume)2020 05:10:00* 



             Test Item    Value        Reference Range Interpretation Comments

 

             Thyroid Stimulating Hormone (TSH) (test code = 56390-0) 0.439      

                             





[facility] Taylor[facility]terial blood pH tsuuhvyxjhq3207-07-93 
10:00:00* 



             Test Item    Value        Reference Range Interpretation Comments

 

             Arterial Blood pH (test code = 2744-1) 7.38                        

            





[facility] TaylorpCO2 ObmI3591-00-31 10:00:00* 



             Test Item    Value        Reference Range Interpretation Comments

 

             Arterial Blood Partial Pressure CO2 (test code = 2019-8) 58        

                              





[facility] Taylor[facility] blood bicarbonate measurement 
(moles/volume)2020 10:00:00* 



             Test Item    Value        Reference Range Interpretation Comments

 

             Arterial Blood HCO3 (test code = 1960-4) 34                        

              





[facility] TaylorArterial blood base excess by calculation
2020 10:00:00* 



             Test Item    Value        Reference Range Interpretation Comments

 

             Arterial Blood Base Excess (test code = 1925-7) 9.0                

                     





[facility] TaylorArterial blood oxygen saturation 
fdzyvhtuclo0675-01-49 10:00:00* 



             Test Item    Value        Reference Range Interpretation Comments

 

             Arterial Blood Oxygen Saturation (test code = 2708-6) 96.0         

                           





[facility] TaylorFluoroscopic procedure less than one hour
vzudnutm8781-89-90 10:00:00* 



             Test Item    Value        Reference Range Interpretation Comments

 

             FiO2 (test code = FiO2) 50                                      





[facility])2020 
08:44:00                                                                        
             [facility]                        [address] [place] [address]      Patient Name: MARGY PEÑA                                   MR #: [id number]                     : 
1960                                   Age/Sex: 59/F  Acct #: [id number]
                             Req #: [id number]  Adm Physician: BRIAN MELVIN MD 
                                    Ordered by: MALINI ARZOLA MD                 
          Report #: [id number]        Location: [facility]                           
      Room/Bed: [place] [address]            
_________________________________________________
__________________________________________________    Procedure: [id number] DX/CH
EST SINGLE (PORTABLE)  Exam Date: 20                            Exam Time:
0610                                              REPORT STATUS: Signed    EXAM
INATION:  CHEST SINGLE (PORTABLE)          INDICATION:  Bilateral infiltrates   
  COMPARISON:  3/28 /2020           FINDINGS:  AP view         TUBES and LINES: 
None.      LUNGS: No significant change in bilateral pulmonary edema.   Bibasi
lar patchy   airspace disease, atelectasis versus consolidation.      PLEURA:  S
mall bilateral pleural effusion, increased. No pneumothorax.      HEART AND MEDI
ASTINUM:  Marked enlargement of the cardiac silhouette is   unchanged.  Enlarged
pulmonary arteries consistent with pulmonary hypertension.      BONES AND SOFT 
TISSUES:  No acute osseous lesion.  Soft tissues are   unremarkable.      UPPER 
ABDOMEN: No free air under the diaphragm.          IMPRESSION:    Stable bilater
al pulmonary edema.   Bibasilar patchy densities may represent atelectasis or perez
perimposed infection   proper clinical setting.      Signed by: Dr. MARIUSZ Fernandes M.D. on 3/29/2020 8:46 AM        Dictated By: MARK FERNANDES MD, MD  El
ectronically Signed By: MARK FERNANDES MD, MD on 20  Transcribed By: MAHNAZ ARMENTA on 20       COPY TO:   MALINI ARZOLA MD         CHEST SINGLE 
(PORTABLE)2020 14:01:00                                                   
                                  [place] [address]      Patient
Name: MARGY PEÑA                                   MR #: [id number]         
           : 1960                                   Age/Sex: 59/F  Acct
#: [id number]                              Req #: [id number]  Adm Physician:  
                                                   Ordered by: JANNETH YANG MD
                           Report #: [id number]        Location: ER              
                       Room/Bed:                     
________________________________________________
___________________________________________________    Procedure: [id number] DX/C
HEST SINGLE (PORTABLE)  Exam Date: 20                            Exam Time
: 1350                                              REPORT STATUS: Signed    EXA
MINATION:  CHEST SINGLE (PORTABLE)          INDICATION:           sob    [id number]
8    1350          COMPARISON:  3/26/2020           FINDINGS:  AP view         T
UBES and LINES:  None.      LUNGS:  Lungs are well inflated.  Worsening bilatera
l pulmonary edema.     Bibasal atelectasis.      PLEURA:  Small bilateral pleura
l effusion, increased. No pneumothorax.      HEART AND MEDIASTINUM:  Marked enla
rgement of the cardiac silhouette is   unchanged.  Enlarged pulmonary arteries c
onsistent with pulmonary hypertension.      BONES AND SOFT TISSUES:  No acute os
seous lesion.  Soft tissues are   unremarkable.      UPPER ABDOMEN: No free air 
under the diaphragm.          IMPRESSION:    Worsening bilateral pulmonary edema
.         Signed by: Dr. Isabel Westfall M.D. on 3/28/2020 2:03 PM     
  Dictated By: ISABEL WESTFALL MD  Electronically Signed By: ISABEL WESTFALL MD on 20 1403  Transcribed By: RICKY on 203     
 COPY TO:   JANNETH YANG MD         Prothrombin time (PT) in platelet poor 
plasma by coagulation zkpds0197-78-98 12:25:00* 



             Test Item    Value        Reference Range Interpretation Comments

 

             Prothrombin Time (test code = 5902-2) 14.6                         

           





[facility] Taylor[facility]R in Platelet poor plasma by 
Coagulation nufak9854-50-77 12:25:00* 



             Test Item    Value        Reference Range Interpretation Comments

 

             Prothromb Time International Ratio (test code = 6301-6) 1.07       

                             





[facility] TaylorActivated partial thromboplastin time 
(aPTT) in platelet poor plasma by coagulation jmdvf9385-03-37 12:25:00* 



             Test Item    Value        Reference Range Interpretation Comments

 

             Activated Partial Thromboplast Time (test code = 38769-8) 33.7     

                               





[facility])2020 
14:06:00                                                                        
             [place] [address]      Patient Name: MARGY PEÑA                                   MR #: [id number]                     : 
1960                                   Age/Sex: 59/F  Acct #: [id number]
                             Req #: [id number]  Adm Physician: BRIAN MELVIN MD 
                                    Ordered by: Sagar Chan NP              
             Report #: [id number]        Location: [facility]                     
         Room/Bed: 200               
______________________________________________
_____________________________________________________    Procedure: [id number] DX
/CHEST SINGLE (PORTABLE)  Exam Date: 20                            Exam Ti
me: 1233                                              REPORT STATUS: Signed    C
hest, 1 view,  3/26/2020.             History: Pulmonary edema.      Comparison:
3/25/2020, 3/24/2020.      Findings: The cardiomediastinal silhouette and pulmo
nary vasculature are   prominent with hazy bilateral perihilar and bibasilar opa
cities and minimal   blunting of the costophrenic sulci. There are no acute osse
ous or soft tissue   abnormalities.       Impression:    Mild CHF without signif
icant change.      Signed by: Kimani Burks on 3/26/2020 2:07 PM        Dictated
By: KIMANI BURKS MD  Electronically Signed By: KIMANI BURKS MD on 20 14
07  Transcribed By: RICKY on 20 1407       COPY TO:   SAGAR CHAN NP 
       Stool gastrointestinal hemoglobin uvdiuzyth3961-83-59 02:10:00* 



             Test Item    Value        Reference Range Interpretation Comments

 

             Stool Occult Blood (test code = 2335-8) NEGATIVE                   

             





[facility] 2 NBKHH8085-74-84 07:15:00         
                                                                            [place] [address]      Patient Name: MARGY PEÑA           
                       MR #: [id number]                     : 1960     
                             Age/Sex: 59/F  Acct #: [id number]                
             Req #: [id number]  Adm Physician: BRIAN MELVIN MD                 
                    Ordered by: YORDAN SOTO MD                            
Report #: [id number]        Location: [facility]                               
Room/Bed: 200-1               _________________________________________________
__________________________________________________    Procedure: [id number] DX/CH
EST 2 VIEWS  Exam Date: 20                            Exam Time: 600     
                                        REPORT STATUS: Signed    EXAMINATION:  
CHEST 2 VIEWS          INDICATION:          SOB    2020      COMPARI
SON:  Chest x-ray 3/24/2020           FINDINGS:        TUBES and LINES:  None.  
   LUNGS:  Normal lung volumes.       There is perihilar interstitial opacities,
  consistent with interstitial edema. Lower lung haziness.      PLEURA:  Small 
bilateral pleural effusions. No pneumothorax.      HEART AND MEDIASTINUM:  Card
iac size is moderately enlarged.          BONES AND SOFT TISSUES:  No acute osse
ous lesion.  Soft tissues are   unremarkable.      UPPER ABDOMEN: No free air un
alma the diaphragm.          IMPRESSION:       Mild cardiomegaly and pulmonary ed
ema small bilateral pleural effusions.   Superimposed pneumonia is possible.    
 Signed by: Carl Johnson DO on 3/25/2020 7:16 AM        Dictated By: CARL JOHNSON DO  Electronically Signed By: CARL JOHNSON DO on 20  Tr
anscribed By: RICKY on 20       COPY TO:   YORDAN SOTO MD         
Automated reticulocyte count as percentage of total wgodtgzqkmvo6879-12-50 
04:40:00* 



             Test Item    Value        Reference Range Interpretation Comments

 

             Percent Reticulocyte Count (test code = 89030-3) 5.3               

                      





[facility] TaylorFluoroscopic procedure less than one hour
wavmzdal0226-15-81 04:40:00* 



             Test Item    Value        Reference Range Interpretation Comments

 

             Hemoglobin A1c Percent (test code = Hemoglobin A1c Percent) 6.2    

                                 





[facility]ownerum or plasma ferritin measurement 
(mass/volume)2020 04:40:00* 



             Test Item    Value        Reference Range Interpretation Comments

 

             Ferritin (test code = 2276-4) 63.28                                

   





[facility] TaylorBlood cobalamin (vitamin B12) measurement
(mass/volume)2020 04:40:00* 



             Test Item    Value        Reference Range Interpretation Comments

 

             Vitamin B12 Level (test code = 87612-6) 678                        

             





[facility] or plasma folate measurement 
(mass/volume)2020 04:40:00* 



             Test Item    Value        Reference Range Interpretation Comments

 

             Folate (test code = 2284-8) 6.1                                    

 





[facility] [id number] 17:12:00        
                                                                             [facility]                        [address] [address]      Patient Name: MARGY PEÑA           
                       MR #: [id number]                     : 1960     
                             Age/Sex: 59/F  Acct #: [id number]                
             Req #: [id number]  Adm Physician: BRIAN MELVIN MD                 
                    Ordered by: ANDRES SANTANA DO                            
Report #: [id number]        Location: [facility]/SURG2                               
Room/Bed: 200               ____________________________________________
_______________________________________________________    Procedure: [id number] 
NM/LUNG PERFUSION  Exam Date: 20                            Exam Time: 151
5                                              REPORT STATUS: Signed    Perfusio
n lung scan only      Note: The Society of Nuclear Medicine and Molecular Imagin
g recommends not   performing lung ventilation studies with xenon because there 
is no way to   assure that the ventilation system used for the study can be adeq
uately   disinfected.  Alternative of using nebulized Tc-99m DTPA for ventilatio
n   studies is absolutely contraindicated due to the airborne droplets created. 
    Clinical Information: Pulmonary edema; SOB      Comparison: Chest radiograph
3/24/2020.      Discussion: No ventilation images were obtained.  See note abo
ve.      Perfusion images of the lungs were obtained in multiple projections fol
lowing   intravenous administration of approximately 6 mCi of Tc-99m MAA. Distri
bution   of tracer is irregular throughout the lungs.  The contours of the lungs
are   well demarcated.  There are no segmental perfusion defects of any size.   
    The cardiomediastinal silhouette is enlarged.      Impression:       1.  S
can findings represent a VERY LOW probability for acute pulmonary embolic   dise
ase based on the PIOPED II criteria.  A ventilation lung scan would not   have c
hanged this assigned probability.   2.  Enlarged cardiac silhouette.      Signed
by: Dr. Rica Hernandez M.D. on 3/24/2020 5:26 PM        Dictated By: RICA HERNANDEZ MD  Electronically Signed By: RICA HERNANDEZ MD on 20  Transcribed By: MAHNAZ ARMENTA on 20       COPY TO:   ANDRES SANTANA DO         CHEST 
SINGLE (PORTABLE)2020 05:08:00                                            
                                         [address] [address]      
Patient Name: MARGY PEÑA                                   MR #: [id number] 
                   : 1960                                   Age/Sex: 
59/F  Acct #: [id number]                              Req #: [id number]  Adm 
Physician:                                                      Ordered by: 
ANDRES SANTANA DO                            Report #: [id number]        
Location: ER                                      Room/Bed:                     
_____________________________________________
______________________________________________________    Procedure: [id number] D
X/CHEST SINGLE (PORTABLE)  Exam Date: 20                            Exam T
nidhi: 0330                                              REPORT STATUS: Signed    
EXAMINATION:  CHEST SINGLE (PORTABLE)          INDICATION:      Chest pain      
COMPARISON:  Chest CT 2019, chest x-ray 2019           FINDINGS:    
     TUBES and LINES:  None.      LUNGS:  Normal lung volumes.       Prominent 
pulmonary vasculature, and   pulmonary interstitium, with central and lower lung
haziness.      PLEURA: Small bilateral pleural effusions. No pneumothorax.      
HEART AND MEDIASTINUM:  Cardiac size is mildly enlarged.       BONES AND SOFT 
TISSUES:  No acute osseous lesion.  Soft tissues are   unremarkable.      UPPER 
ABDOMEN: No free air under the diaphragm.       IMPRESSION:       Mild cardiomeg
brandee and pulmonary edema.      Signed by: Carl Johnson DO on 3/24/2020 5:09 AM
       Dictated By: CARL JOHNSON DO  Electronically Signed By: CARL TSE DO on 20  Transcribed By: RICKY on 20       COPY TO
:   ANDRES SANTANA DO         Influenza virus A and B antigen identification 
by [id number] 04:30:00* 



             Test Item    Value        Reference Range Interpretation Comments

 

             Influenza Virus Types A,B Antigen (test code = 32953-6) NEGATIVE   

                             





[id number] 16[facility]09[facility]00           
                                                                          [place] [address]      Patient Name: MARGY PEÑA           
                       MR #: [id number]                     : 1960     
                             Age/Sex: 59/F  Acct #: [id number]                
             Req #: [id number]  Adm Physician: BRIAN MELVIN MD                 
                    Ordered by: YORDAN SOTO MD                            
Report #: [id number]        Location: MED/SURG2                               
Room/Bed: [facility]               _________________________________________________
__________________________________________________    Procedure: [id number] CT/CT
CHEST WO  Exam Date: 19                            Exam Time: 1140        
                                     REPORT STATUS: Signed    EXAM: CT Chest W
ITHOUT intravenous contrast 2019 10:50 AM   INDICATION: Shortness of breat
h   COMPARISON: Chest radiograph of earlier the same day   TECHNIQUE:   Chest wa
s scanned utilizing a multidetector helical scanner from the lung apex   through
the level of the adrenal glands without administration of IV contrast.   Coronal
and sagittal reformations were obtained. Routine protocol was   performed.      
IV CONTRAST: None   RADIATION DOSE: Total DLP: 539.0 mGy*cm. Dose modulation, 
iterative   reconstruction, and/or weight based adjustment of the mA/kV was util
ized to   reduce the radiation dose to as low as reasonably achievable.    COMPL
ICATIONS: None      FINDINGS:      LINES/ TUBES: None.      LUNGS AND AIRWAYS:  
The central airways are patent. No focal consolidation.   There is interstitial 
and mild airspace edema with smooth interlobular septal   thickening and fluid w
ithin the fissures. Scattered areas of a lateral   subsegmental atelectasis, mos
t notably at the dependent lower lobes.        PLEURA: Small right pleural effus
ion. No pneumothorax.      HEART AND MEDIASTINUM: 2.5 cm left lower thyroid hypo
dense nodule and 1.6 cm   right lower thyroid hypodense nodule.  No supraclavicu
lar, mediastinal, or   hilar lymphadenopathy. Marked multichamber cardiomegaly. 
No pericardial   effusion. The main pulmonary artery is markedly dilated, measur
ing up to 4.1   cm. Diffuse atherosclerotic calcifications involve the coronary 
arteries,   thoracic aorta, and proximal great vessels.      UPPER ABDOMEN: Limi
giovanni noncontrast images of the upper abdomen are degraded by   streak artifact an
d motion and demonstrate no definite abnormalities of the   partially visualized
liver and spleen.      BONES: No acute osseous injury. No suspicious lytic or b
lastic lesions.   Degenerative changes of the visualized spine.      SOFT TISSUE
S: Unremarkable.      IMPRESSION:    Pulmonary edema and marked cardiomegaly. Di
lated main pulmonary artery   compatible with pulmonary artery hypertension.    
 Diffuse atherosclerotic arterial calcifications including of the coronary   ar
teries.      Right and left lower thyroid nodules measuring up to 2.5 cm on the 
left and 1.6   cm on the right. Recommend follow-up thyroid ultrasound on a nonu
rgent basis   for further evaluation.      Signed by: Fritz Esparza MD on 20 4:17 PM        Dictated By: FRITZ ESPARZA MD  Electronically Signed By: FRITZ ESPARZA MD on 19  Transcribed By: RICKY on 19       COPY TO: 
 YORDAN SOTO MD         CHEST 2 [id number] 09:02:00                        
                                                             [address] [address]      Patient Name: MARGY PEÑA                         
         MR #: [id number]                     : 1960                   
               Age/Sex: 59/F  Acct #: [id number]                              
Req #: [id number]  Adm Physician: BRIAN MELVIN MD                              
       Ordered by: YORDAN SOTO MD                            Report #: [id number]
[id number]        Location: MED/SURG2                               Room/Bed: [facility]   
           _________________________________________________
__________________________________________________    Procedure: [id number] DX/CH
EST 2 VIEWS  Exam Date: 19                            Exam Time: 0635     
                                        REPORT STATUS: Signed    EXAMINATION:  
CHEST 2 VIEWS          INDICATION: Pneumonia      COMPARISON: Chest are graft 12
/15/2019           FINDINGS:      LINES/TUBES:None      LUNGS:The lungs are mode
rately inflated. There is perihilar fullness and   indistinctness of the pulmona
ry vasculature. Unchanged bibasilar patchy   opacities.      PLEURA:Small bilate
ral pleural effusions. No pneumothorax.      MEDIASTINUM:Cardiomediastinal silho
uette is stably enlarged.      BONES/SOFT TISSUES:No acute osseous injury.      
ABDOMEN:No free air under the diaphragm.         IMPRESSION:    Unchanged pulmon
carl edema and cardiomegaly.      Stable small bilateral pleural effusions.      
Bibasilar patchy opacities, more likely subsegmental atelectasis than   superimp
osed aspiration or pneumonia.      Signed by: Fritz Esparza MD on 2019 9:04 
AM        Dictated By: FRITZ ESPARZA MD  Electronically Signed By: FRITZ ESPARZA MD on
19  Transcribed By: RICKY on 19       COPY TO:   YORDAN SOTO MD         CHEST SINGLE (PORTABLE)2019-12-15 21:00:00                     
                                                                [place] [address]      Patient Name: MARGY PEÑA           
                       MR #: [id number]                     : 1960     
                             Age/Sex: 59/F  Acct #: [id number]                
             Req #: [id number]  Adm Physician: BRIAN MELVIN MD                 
                    Ordered by: Sagar Chan NP                            
Report #: [id number]        Location: MED/SURG2                               
Room/Bed: [facility]               ______________________________________________
_____________________________________________________    Procedure: [id number] DX
/CHEST SINGLE (PORTABLE)  Exam Date: 12/15/19                            Exam Ti
me:                                               REPORT STATUS: Signed    E
XAMINATION:  CHEST SINGLE (PORTABLE)          INDICATION: Congestive heart failu
re           COMPARISON:  Chest x-ray 12/15/2019           FINDINGS:         Exa
m limited by portable technique and suboptimal x-ray exposure/penetration.      
TUBES and LINES:  None.      LUNGS/PLEURA:  Low lung volumes. Dense hazy opacit
ies in the central and lower   lungs. Bilateral hemidiaphragms obscured.      HE
ART AND MEDIASTINUM:  Cardiac size is mildly enlarged. There are   atherosclerot
ic calcifications within the aorta.      BONES AND SOFT TISSUES: Unchanged.     
UPPER ABDOMEN: Unchanged.       IMPRESSION:       Persistent findings of mild c
ardiomegaly, pulmonary edema, and small bilateral   pleural effusions.      Sign
ed by: Carl Johnson DO on 12/15/2019 9:03 PM        Dictated By: CARL TSE DO  Electronically Signed By: CARL JOHNSON DO on 12/15/19 2103  Transcr
ibed By: RICKY on 12/15/19 2103       COPY TO:   SAGAR CHAN NP         
CHEST 2 VIEWS2019-12-15 06:58:00                                                
                                     [address] [address]      
Patient Name: MARGY PEÑA                                   MR #: [id number] 
                   : 1960                                   Age/Sex: 
59/F  Acct #: [id number]                              Req #: [id number]  Adm 
Physician: BRIAN MELVIN MD                                      Ordered by: 
YORDAN SOTO MD                            Report #: [id number]        Location: 
IM                                    Room/Bed: [address]          
_________________________________________________
__________________________________________________    Procedure: [id number] DX/CH
EST 2 VIEWS  Exam Date:                             Exam Time:                  
                            REPORT STATUS: Signed    EXAMINATION:  CHEST 2 VIEWS
         INDICATION: Pulmonary edema, pleural effusion        COMPARISON:  Chest
x-ray 2019           FINDINGS:        TUBES and LINES:  None.      LUNG
S/PLEURA:  Lungs are well inflated.  Central and lower lung hazy airspace   opac
ities. Prominent pulmonary vasculature.      Persistent basilar opacities   obsc
ured right hemidiaphragm. Blunted costophrenic sulci.      HEART AND MEDIASTINUM
:  Cardiac size is mildly enlarged.          BONES AND SOFT TISSUES:  No acute o
sseous lesion.  Soft tissues are   unremarkable.      UPPER ABDOMEN: No free air
under the diaphragm.          IMPRESSION:       Mild cardiomegaly with persiste
nt pulmonary edema and small bilateral pleural   effusions, right greater than l
eft.      Signed by: Carl Johnson DO on 12/15/2019 7:00 AM        Dictated By
: CARL JOHNSON DO  Electronically Signed By: CARL JOHNSON DO on 12/15/19
07  Transcribed By: RICKY on 12/15/19 07       COPY TO:   YORDAN SOTO MD  
       CHEST (INCL MEDIASTINUM)2019 13:38:00                            
                                                         [place] [address]      Patient Name: MARGY PEÑA                         
         MR #: [id number]                     : 1960                   
               Age/Sex: 59/F  Acct #: [id number]                              
Req #: [id number]  Adm Physician: BRIAN MELVIN MD                              
       Ordered by: YORDAN SOTO MD                            Report #: [id number]
[id number]        Location: [facility]                                    Room/Bed: IMCU 
187-1          _________________________________________________
__________________________________________________    Procedure: [id number] US/US
CHEST (INCL MEDIASTINUM)  Exam Date: 19                            Exam T
nidhi: 1235                                              REPORT STATUS: Signed    
EXAM: Limited mediastinal ultrasound      INDICATION: Evaluate for pleural effus
ion.      COMPARISON: Chest radiograph 2019.      TECHNIQUE: Limited ultra
sound was performed of the bilateral hemithoraces to   evaluate for pleural effu
paulo.      FINDINGS/IMPRESSION:        There are trace bilateral pleural effusio
ns.      Signed by: Dr. Vincent Mason MD on 2019 1:39 PM        Dictated By:
VINCENT MASON MD  Electronically Signed By: VINCENT MASON MD on 19 1339  Transc
ribed By: RICKY on 19 1339       COPY TO:   YORDAN SOTO MD         CHEST
SINGLE (PORTABLE)2019 22:30:00                                            
                                         [place] [address]      
Patient Name: MARGY PEÑA                                   MR #: [id number] 
                   : 1960                                   Age/Sex: 
59/F  Acct #: [id number]                              Req #: [id number]  Adm 
Physician:                                                      Ordered by: 
SYDNEE RODRÍGUEZ MD                            Report #: [id number]        
Location: ER                                      Room/Bed:                     
_________________________________________
__________________________________________________________    Procedure: 
90 DX/CHEST SINGLE (PORTABLE)  Exam Date: 19                            Ex
am Time:                                               REPORT STATUS: Signed
   EXAMINATION:  CHEST SINGLE (PORTABLE)          INDICATION: Short of breath   
    COMPARISON:  Chest x-ray 2019           FINDINGS:          Portable t
echnique and underexposure/underpenetration limits evaluation.      TUBES and LI
POOJA:  None.      LUNGS/PLEURA:  Increased pulmonary interstitial lung markings a
nd haziness of   the in the mid to lower lungs, worse centrally.  Prominent pulm
onary   vasculature. Hemidiaphragms not well seen. No pneumothorax.      HEART A
ND MEDIASTINUM:  Cardiac size is mildly enlarged. There are   atherosclerotic ca
lcifications within the aorta.      BONES AND SOFT TISSUES:  No acute osseous le
paulo.  Soft tissues are   unremarkable.      UPPER ABDOMEN: No free air under th
e diaphragm.          IMPRESSION:       Mild cardiomegaly and mid to lower lung 
haziness likely due to pulmonary edema   and/or pleural effusions, pneumonia may
be superimposed.       Signed by: Carl Johnson DO on 2019 10:34 PM     
  Dictated By: CARL JOHNSON DO  Electronically Signed By: CARL JOHNSON DO on 19  Transcribed By: RICKY on 19       COPY TO:   
SYDNEE RODRÍGUEZ MD         CHEST 2 [id number] 07:21:00                
                                                                     [place] [address]      Patient Name: MARGY PEÑA           
                       MR #: [id number]                     : 1960     
                             Age/Sex: 58/F  Acct #: [id number]                
             Req #: [id number]  Adm Physician: BRIAN MELVIN MD                 
                    Ordered by: YORDAN SOTO MD                            
Report #: [id number]        Location: ICU                                     
Room/Bed: ICU [facility]           _________________________________________________
__________________________________________________    Procedure: [id number] DX/CH
EST 2 VIEWS  Exam Date:                             Exam Time:                  
                            REPORT STATUS: Signed    EXAMINATION:  CHEST 2 VIEWS
         INDICATION: CHF      COMPARISON:  Chest radiograph 2019.          
 FINDINGS:   TUBES and LINES:  Interval extubation. Interval removal of enteric 
tube.      LUNGS: Low lung volumes. Interval decrease in bilateral interstitial 
and   airspace opacities. Mild patchy bibasilar opacities. Central vascular   
congestion. No new consolidation.       PLEURA: Interval decrease in small bila
teral pleural effusions. No evidence of   pneumothorax.      HEART AND MEDIASTIN
UM:  The cardiomediastinal silhouette is mildly enlarged.      BONES AND SOFT TI
SSUES:  No acute osseous abnormality.      UPPER ABDOMEN: No free air under the 
diaphragm.          IMPRESSION:    Cardiomegaly with interval decrease in pulmon
carl interstitial edema and small   bilateral pleural effusions.      Interval ex
tubation and interval removal of enteric tube.       Signed by: Dr. Vincent Mason MD on 2019 7:24 AM        Dictated By: VINCENT MASON MD  Electronically Signed
By: VINCENT MASON MD on 19  Transcribed By: RICKY on 19    
  COPY TO:   YORDAN SOTO MD         ABDOMEN-1VIEW (KUB)2019 06:49:00      
                                                                               
[place] [address]      Patient Name: MARGY PEÑA           
                       MR #: [id number]                     : 1960     
                             Age/Sex: 58/F  Acct #: [id number]                
             Req #: [id number]  Adm Physician: BRIAN MELVIN MD                 
                    Ordered by: Sagar Chan NP                            
Report #: [id number]        Location: ICU                                     
Room/Bed: [place] [address]           ______________________________________________
_____________________________________________________    Procedure: [id number] DX
/ABDOMEN-1VIEW (KUB)  Exam Date: 19                            Exam Time: 
0615                                              REPORT STATUS: Signed       Ex
am: Abdominal film       Clinical History: Evaluate for obstruction      Compari
son: None.      DISCUSSION: Nonobstructive bowel gas pattern. Air filled stomach
. Soft tissue   attenuation limits evaluation.      IMPRESSION:      Air-filled 
stomach. No obstructive pattern.                        Signed by: Dr. Andrew Pa
lisch, M.D. on 2019 6:50 AM        Dictated By: ANDREW R PALISCH MD  Electr
onically Signed By: ANDREW R PALISCH MD on 19  Transcribed By: CODEY SUTHERLAND on 19       COPY TO:   SAGAR CHAN NP         US CHEST (INCL 
MEDIASTINUM)2019 10:07:00                                                 
                                    [place] [address]      
Patient Name: MARGY PEÑA                                   MR #: [id number] 
                   : 1960                                   Age/Sex: 
58/F  Acct #: [id number]                              Req #: [id number]  Adm 
Physician: BRIAN MELVIN MD                                      Ordered by: 
NADIR ERIC MD                            Report #: [id number]        
Location: ICU                                     Room/Bed: [place] [address]           
_____________________________________________
______________________________________________________    Procedure: [id number] 
S/US CHEST (INCL MEDIASTINUM)  Exam Date: 19                            Ex
am Time: 918                                              REPORT STATUS: Signed
   Examination: Thoracic ultrasound.      Clinical indication: Pleural effusion.
     Comparison examination: Chest radiograph same day.      Technique: Transv
erse and longitudinal sonographic images of the right and left   thoracic caviti
es was performed.      Findings:      Trace right pleural effusion. No significa
nt left pleural effusion.      Impression:      Trace right pleural effusion.   
  Signed by: Dr. Yordan Quiroz M.D. on 2019 10:08 AM        Dictated By: 
YORDAN QUIROZ MD  Electronically Signed By: YORDAN QUIROZ MD on 19 1008  T
ranscribed By: RICKY on 19 1008       COPY TO:   NADIR ERIC MD     
   CHEST SINGLE (PORTABLE)2019 10:05:00                                   
                                                  [place] [address]      Patient Name: MARGY PEÑA                                   
MR #: [id number]                     : 1960                            
      Age/Sex: 58/F  Acct #: [id number]                              Req #: 
[id number]  Adm Physician: BRIAN MELVIN MD                                     
Ordered by: NADIR ERIC MD                            Report #: [id number]  
     Location: ICU                                     Room/Bed: ICU [facility]      
    _____________________________________________
______________________________________________________    Procedure: [id number] D
X/CHEST SINGLE (PORTABLE)  Exam Date: 19                            Exam T
nidhi: 0930                                              REPORT STATUS: Signed    
Examination: Single AP view of the chest.      COMPARISON: 2019      INDICA
TION: Intubated           DISCUSSION:      See impression            IMPRESSION:
      1.   Endotracheal tube and enteric tube are unchanged in position.        
2. Unchanged cardiomegaly, interstitial pulmonary edema, and suspected   bilat
eral pleural effusions left larger than right. No new consolidation.      Signed
by: Dr. Yordan Quiroz M.D. on 2019 10:07 AM        Dictated By: YORDAN SOSA MD  Electronically Signed By: YORDAN QUIROZ MD on 19 1007  Transcribe
d By: RICKY on 19 1007       COPY TO:   NADIR ERIC MD         CHEST
SINGLE (PORTABLE)2019 06:02:00                                            
                                         [place] [address]      
Patient Name: MARGY PEÑA                                   MR #: [id number] 
                   : 1960                                   Age/Sex: 
58/F  Acct #: [id number]                              Req #: [id number]  Adm 
Physician: BRIAN MELVIN MD                                      Ordered by: 
MALINI ARZOLA MD                            Report #: [id number]        Location: 
ICU                                     Room/Bed:            
_________________________________________________
__________________________________________________    Procedure: [id number] DX/CH
EST SINGLE (PORTABLE)  Exam Date: 19                            Exam Time:
05                                              REPORT STATUS: Signed    Exam
ination: Single AP view of the chest.      COMPARISON: 2019      INDIC
ATION: Congestive heart failure           DISCUSSION:      Lines/tubes:  Endotra
cheal tube 3 cm above the carly. Enteric tube with distal   tip not visualized.
     Lungs:  Decreased, but persistent interstitial and alveolar edema.      Pl
eura:  Bilateral effusions with lower lung atelectasis.      Heart and mediastin
um:  Cardiomegaly      Bones and soft tissues:  No acute bony abnormalities.    
      IMPRESSION:       Cardiomegaly with mildly decreased pulmonary edema and 
effusions.      Signed by: Dr. Andrew Palisch, M.D. on 2019 6:04 AM        
Dictated By: ANDREW R PALISCH MD  Electronically Signed By: ANDREW R PALISCH MD 
on 19  Transcribed By: RICKY on 19       COPY TO:   MALINI GUILLEN MD         CHEST SINGLE (PORTABLE)2019 15:30:00                   
                                                                  [place] [address]      Patient Name: MARGY PEÑA           
                       MR #: [id number]                     : 1960     
                             Age/Sex: 58/F  Acct #: [id number]                
             Req #: [id number]  Adm Physician:                                  
                   Ordered by: ABBY MALDONADO MD                            
Report #: [id number]        Location: ER                                      
Room/Bed:                     ____________________________________________
_______________________________________________________    Procedure: [id number] 
DX/CHEST SINGLE (PORTABLE)  Exam Date: 19                            Exam 
Time: 1520                                              REPORT STATUS: Signed   
Examination: Single AP view of the chest.      COMPARISON: 2019 at 1334   
INDICATION: Post intubation           DISCUSSION:      Interval intubation. The 
endotracheal tube tip projects approximately 3 cm   above the carly. An enteric
tube has also been placed, the tip of which   projects off the current radiogra
ph, inferior to the left hemidiaphragm.      No appreciable interval change in t
he appearance of the heart or lungs compared   to the examination from 1334 hour
s. Marked cardiomegaly with interstitial and   alveolar edema and probable trace
bilateral pleural effusions.      IMPRESSION:       Interval intubation and frandy
cement of an enteric tube, appropriately positioned   as above.      Stable sancho
ed enlargement of the cardiac silhouette with pulmonary edema and   suspected sm
all bilateral pleural effusions.      Signed by: Dr. Yordan Quiroz M.D. on  3:34 PM        Dictated By: YORDAN QUIROZ MD  Electronically Signed By: KELLY QUIROZ MD on 19 153  Transcribed By: RICKY on 19 153       
COPY TO:   ABBY MALDONADO MD         CHEST SINGLE (PORTABLE)2019 
14:00:00                                                                        
             [place] [address]      Patient Name: MARGY PEÑA                                   MR #: [id number]                     : 
1960                                   Age/Sex: 58/F  Acct #: [id number]
                             Req #: [id number]  Adm Physician:                  
                                   Ordered by: JORGITO ASTUDILLO NP            
               Report #: [id number]        Location: ER                          
           Room/Bed:                     
____________________________________________
_______________________________________________________    Procedure: [id number] 
DX/CHEST SINGLE (PORTABLE)  Exam Date: 19                            Exam 
Time: 1330                                              REPORT STATUS: Signed   
EXAMINATION:  CHEST SINGLE (PORTABLE)          INDICATION:      Shortness of br
eath      ERMD ORDER    [id number]    1330    Y       COMPARISON:  2018      
    FINDINGS:   LINES/TUBES: None      LUNGS: Vascular congestion/edema and bib
asilar atelectasis. This is worse when   compared with the prior exam      PLEUR
A: Likely right and left pleural effusions.      HEART AND MEDIASTINUM: Marked e
nlargement of the cardiomediastinal silhouette.      BONES AND SOFT TISSUES: No 
acute findings.       IMPRESSION:   Findings most likely due to congestive heart
failure worse when compared with   the prior exam. Follow-up imaging is indicat
ed to document clearing.      Signed by: Dr. Dora Sutton M.D. on 2019 2:02
PM        Dictated By: DORA SUTTON MD, MD  Electronically Signed By: DORA SUTTON MD, MD on 19  Transcribed By: RICKY on 19 140       COPY
TO:   JORGITO ASTUDILLO NP         CHEST 2 VIEWS    [place] [address]      Patient 
Name: MARGY PEÑA   MR #: [id number]    : 1960 Age/Sex: 57/F  Acct 
#: [id number] Req #: [id number]  Adm Physician: BRIAN MELVIN MD    Ordered 
by: YORDAN SOTO MD  Report #: [id number]   Location: MED/SURG  [place]Bed[place] [facility] [place]  
 __________________________________________________
_________________________________________________    Procedure: [id number] DX/BOZENA
ST 2 VIEWS  Exam Date: 18                            Exam Time: 0851      
REPORT STATUS: Signed    PROCEDURE: X-RAY CHEST, TWO VIEWS   COMPARISON: 
018.   INDICATIONS: SHORT OF BREATH       FINDINGS:         Lungs are reasonably
well inflated. Unchanged small bilateral effusions    with bilateral lower lobe 
airspace disease, likely atelectasis.    Interstitial pulmonary edema unchanged 
when accounting for differences    in technique. Stable cardiomediastinal conto
ur. No acute osseous    abnormality.       CONCLUSION:      Cardiomegaly with in
terstitial pulmonary edema and small bilateral    pleural effusions, similar in 
appearance to 2018 accounting for    differences in technique.        Dicta
giovanni by:  Yordan Quiroz M.D. on 2018 at 9:51        Electronically approved by
:  Yordan Quiroz M.D. on 2018 at 9:51                Dictated By: YORDAN RUBIO MD  Electronically Signed By: YORDAN QUIROZ MD on 18  Transcribed 
By: ELVIA on 18       COPY TO:   YORDAN SOTO MD        CHEST SINGLE 
(PORTABLE)    [place] [address]      Patient Name: MARGY PEÑA   MR #: 
[id number]    : 1960 Age/Sex: 57/F  Acct #: [id number] Req #: [id number]
[id number]  Adm Physician: BRIAN EMLVIN MD    Ordered by: SYDNEE RODRÍGUEZ MD
 Report #: [id number]   Location: [facility]  Room/Bed: [place] [address]    
________________________________________
___________________________________________________________    Procedure: [id number]
[id number] DX/CHEST SINGLE (PORTABLE)  Exam Date: 18                            E
xam Time: 0535       REPORT STATUS: Signed    EXAM: CHEST SINGLE (PORTABLE), AP 
1 view   INDICATION: COPD   COMPARISON: AP view of the chest 2018 at 0
009 hours      FINDINGS:   LINES/TUBES: None      LUNGS: Vascular congestion/nilda
ma and bibasilar atelectasis.       PLEURA: Indeterminate for pleural effusions.
     HEART AND MEDIASTINUM: Stable enlargement of the cardiomediastinal silhoue
tte.      BONES AND SOFT TISSUES: No acute findings.       IMPRESSION:   No inte
rval change.            Signed by: Dr. Holland Devries M.D. on 2018 6:08
AM        Dictated By: HOLLAND DEVRIES MD  Electronically Signed By: HOLLAND DEVRIES MD on 18  Transcribed By: RICKY on 18       COPY TO:   
SYDNEE RODRÍGUEZ MD        CHEST SINGLE (PORTABLE)    [place] [address]      
Patient Name: MARGY PEÑA   MR #: [id number]    : 1960 Age/Sex: 57/F
 Acct #: [id number] Req #: [id number]  Adm Physician:     Ordered by: 
SYDNEE RODRÍGUEZ MD  Report #: [id number]   Location: ER  Room/Bed:     
____________________________________________________________________
_______________________________    Procedure: [id number] DX/CHEST SINGLE (PORTABL
E)  Exam Date: 18                            Exam Time: 0570       REPORT 
STATUS: Signed    EXAM: CHEST SINGLE (PORTABLE), AP 1 view   INDICATION: Low O2 
sats, shortness of breath   COMPARISON: AP view of the chest 2018     
FINDINGS:   LINES/TUBES: None      LUNGS: Limited view of the lungs due to tech
nique and body habitus. Increased   vascular congestion and bibasilar atelectasi
s.       PLEURA: Indeterminate for pleural effusions.      HEART AND MEDIASTINUM
: Stable enlargement of the cardiomediastinal silhouette.      BONES AND SOFT TI
SSUES: No acute findings.       IMPRESSION:   Cardiomegaly, increased vascular c
ongestion and bibasilar atelectasis.            Signed by: Dr. Holland Devries M.D. on 2018 12:36 AM        Dictated By: HOLLAND DEVRIES MD  Electronicall
y Signed By: HOLLAND DEVRIES MD on 18  Transcribed By: RICKY on        COPY TO:   SYDNEE RODRÍGUEZ MD        CHEST SINGLE (PORTABLE)
   [place] [address]      Patient Name: MARGY PEÑA   MR #: [id number]    : 1960 Age/Sex: 57/F  Acct #: [id number] Req #: [id number]  Adm Physician: BRIAN DIETZ MD    Ordered by: Sagar Chan NP  Report #: [id number]   Location
: MED/SURG2  Room/Bed: [facility]    ________________________________________________
___________________________________________________    Procedure: [id number] DX/C
HEST SINGLE (PORTABLE)  Exam Date: 18                            Exam Time
: 1240       REPORT STATUS: Signed    PROCEDURE:   A single AP view of the chest
.       COMPARISON: [facility], DX, CHEST SINGLE (PORTABLE),    3/1
2018, 5:32.       INDICATIONS:   COPD, ACUTE RESPIRATORY DISTRESS, AFIB,ATRIAL
FLUTTER           FINDINGS:   See impression.       IMPRESSION:        1. right-
sided PICC line is unchanged.   2. Slight interval decrease in bilateral pulmon
carl edema. Persistent    enlarged cardiac silhouette, central pulmonary venous c
ongestion and    perihilar interstitial edema. No consolidation.   3. Likely sma
ll right pleural effusion.        Waylon Webb M.D.     Dictated by:  Selvin Webb M.D. on 3/20/2018 at 13:09        Electronically approved by:  Pushpa Webb M.D. on 3/20/2018 at    13:09                Dictated By: ANGELO WEBB MD  Electronically Signed By: WAYLON WEBB MD on 18 1309  [place]
scribed By: ELVIA on 18 1309       COPY TO:   SAGAR CHAN NP        
CHEST SINGLE (PORTABLE)    [place] [address]      Patient Name: MARGY PEÑA   MR
#: [id number]    : 1960 Age/Sex: 57/F  Acct #: [id number] Req #: [id number]
[id number]  Adm Physician: BRIAN MELVIN MD    Ordered by: BRIAN MELVIN MD  
Report #: [id number]   Location: ICU  Room/Bed: [place] [address]    
___________________________________________________
________________________________________________    Procedure: [id number] DX/CHES
T SINGLE (PORTABLE)  Exam Date: 18                            Exam Time: 0
515       REPORT STATUS: Signed    CHEST SINGLE (PORTABLE), 3/14/2018 5:00 AM   
  Technique: CHEST SINGLE (PORTABLE)   Comparison: 3/11/2018   Clinical history:
Congestive heart failure      Findings:   Limited by overlying soft tissue att
enuation and portable technique.      Impression:   1. Lines/Tubes: Stable right
PICC over the SVC.   2. Stable enlarged cardiac silhouette.   3. Persistent nilda
ma with bibasilar atelectasis and small effusions.      Signed by: Dr Kalyani rivera MD on 3/14/2018 6:22 AM        Dictated By: KALYANI GALARZA MD  Electronica
lly Signed By: KALYANI GALARZA MD on 18 0622  Transcribed By: RICKY on 0
3/14/18 0622       COPY TO:   BRIAN MELVIN MD        IRENA COMPLETE [place] [address]    Patient Name : MARGY PEÑA         MR #: [id number]   : 1960 
       Age/Sex: 57/F      Acct # : [id number]           Adm Physician  : 
BRIAN MELVIN MD       Admit Date  :  18       Location : MED/SURG2    
Room/Bed : 200-1          ___________________________________________________
________________________________________________     REPORT: Cardiology Report  
    DATE OF STUDY:  2018       TRANSESOPHAGEAL ECHOCARDIOGRAM       I
NDICATIONS:  Rule out thrombus prior to cardioversion.      DESCRIPTION OF PROCE
DURE:  After informed consent, the patient was    anesthetized by the anesthesio
logist.  Transesophageal probe was passed    without any difficulty, and images 
were being obtained.  However, the    patient desaturated.  The probe was remove
d and the patient was bagged by    the anesthesiologist.  Saturations went up to
the 90s, and the patient was    reintubated.  A limited transesophageal echocar
diogram was performed to    rule out thrombus.      REPORT:  This is a limited s
tudy.     1.  Left ventricle:  Appears to be of normal size with preserved systo
lic    function.  Overall estimated ejection fraction appears to be about 50%.  
2.  Left atrium:  Appears to be dilated.  No spontaneous echo contrast or    th
rombus is seen in the left atrium or left atrial appendage.     3.  Right atrium
:  Appears to be mildly dilated.   4.  Right ventricle:  Appears to be normal.  
5.  Mitral valve is thickened without valve excursion.  No vegetations are    n
oted on the mitral valve leaflets.  Mild mitral regurgitation is noted.   6.  Tr
icuspid valve:  Is poorly visualized.  Trace tricuspid regurgitation    is noted
.  No obvious vegetations are noted.   7.  Aortic valve:  The view seen appears 
to be thickened.      CONCLUSIONS   1.  Technically limited study.   2.  Left ve
ntricle is of normal size with preserved systolic function.   3.  The overall es
timated ejection fraction is about 50%.   4.  Left atrium is dilated.   5.  No s
pontaneous echo contrast or thrombus is seen in the left atrium or    left atria
l appendage.   6.  Mild mitral regurgitation.     7.  Mild tricuspid regurgitati
on is noted.   8.  No intracardiac thrombi or vegetation noted.               DD
:  2018 15:33   DT:  2018 15:45   Job#:  [id number]             Sign
ature                                                                   Date    
                      Dictated By: TA RUIZ MD  Transcribed By: SMEDS on 
18   <Electronically signed by TA RUIZ MD><<Signature on File>>
18 0816    COPY TO:     CHEST SINGLE (PORTABLE)    [place] [address]      
Patient Name: MARGY PEÑA   MR #: [id number]    : 1960 Age/Sex: 57/F  
Acct #: [id number] Req #: [id number]  Adm Physician: BRIAN MELVIN MD    
Ordered by: BRIAN MELVIN MD  Report #: [id number]   Location: ICU  [facility]Bed: 
[facility]    ___________________________________________________
________________________________________________    Procedure: [id number] DX/CHES
T SINGLE (PORTABLE)  Exam Date: 18                            Exam Time: 0
535       REPORT STATUS: Signed    EXAMINATION:  CHEST SINGLE (PORTABLE)        
 INDICATION:           CHF.       COMPARISON:  None           FINDINGS:   TUBES 
and LINES:  Right upper extremity PICC line with tip at the level of the   mid 
SVC.      LUNGS:  Lungs are not well inflated.  There are bibasilar atelectasis.
  There   is mild prominence of the central pulmonary vasculature, consistent w
ith   pulmonary venous congestion. Mild interlobular septi thickening present.  
   PLEURA:  No pleural effusion or pneumothorax.      HEART AND MEDIASTINUM:  C
ardiac size is severely enlarged. There are   atherosclerotic calcifications wit
hin the aorta.      BONES AND SOFT TISSUES:  No acute osseous lesion.  Soft tiss
ues are   unremarkable.      UPPER ABDOMEN: No free air under the diaphragm.    
     IMPRESSION:    Cardiogenic mild pulmonary edema.         Signed by: Dr. Preethi Ghosh M.D. on 3/11/2018 7:32 AM        Dictated By: FERDINAND YU MD
 Electronically Signed By: FERDINAND YU MD on 18  Transcribed
By: RICKY on 18       COPY TO:   BRIAN MELVIN MD        CHEST 
SINGLE (PORTABLE)    [place] [address]      Patient Name: MARGY PEÑA   MR #: 
[id number]    : 1960 Age/Sex: 57/F  Acct #: [id number] Req #: [id number]
[id number]  Adm Physician: BRIAN MELVIN MD    Ordered by: NADIR ERIC MD  
Report #: [id number]   Location: ICU  Room[facility]Bed: ICU [facility]    
_________________________________________________
__________________________________________________    Procedure: [id number] DX/
EST SINGLE (PORTABLE)  Exam Date: 18                            Exam Time:
0520       REPORT STATUS: Signed    EXAMINATION:  CHEST SINGLE (PORTABLE)       
  INDICATION:           Ventilated       COMPARISON:  3/6/2018           FINDI
NGS:   TUBES and LINES:  Endotracheal tube 3.8 cm above the carly. NG tube is n
ow in   place in good position and right upper extremity PICC line is stable    
 LUNGS:  Lungs are not well inflated.  There are bibasilar atelectasis.   There 
 is perihilar interstital opacities, consistent with interstitial edema.      
PLEURA:  Small bilateral pleural effusion.      HEART AND MEDIASTINUM:  Cardiac 
size is severely enlarged. There are   atherosclerotic calcifications within the
aorta.      BONES AND SOFT TISSUES:  No acute osseous lesion.  Soft tissues are 
 unremarkable.      UPPER ABDOMEN: No free air under the diaphragm.          I
MPRESSION:    1.  Stable cardiomegaly with interstitial edema and small pleural 
effusions   2.  Tubes and lines are in good position         Signed by: Dr. Ferdinand Ghosh M.D. on 3/7/2018 6:50 AM        Dictated By: FERDINAND YU MD  E
lectronically Signed By: FERDINAND YU MD on 1850  Transcribed B
y: RICKY on 1850       COPY TO:   NADIR ERIC MD        CHEST 
SINGLE (PORTABLE)    [place] [address]      Patient Name: MARGY PEÑA   MR #: 
[id number]    : 1960 Age/Sex: 57/F  Acct #: [id number] Req #: [id number]
[id number]  Adm Physician: BRIAN MELVIN MD    Ordered by: ADELAIDA GIRALDO MD  
Report #: [id number]   Location: [facility]  Room/Bed: [place] [address]    
_______________________________________________
____________________________________________________    Procedure: [id number] DX/
CHEST SINGLE (PORTABLE)  Exam Date: 18                            Exam Jon
e: 0550       REPORT STATUS: Signed    PROCEDURE:   A single AP view of the ches
t.       COMPARISON: The 3/6/2018 0217 hours       INDICATIONS:   INTUBATED     
     FINDINGS:   Lines/tubes: Stable position of an ET tube and a right-sided P
ICC.       Lungs: Unchanged pulmonary edema.       Pleura: Small bilateral effus
ions.       Heart and mediastinum:  The heart is enlarged.       Bones:  No acut
e bony abnormality.       IMPRESSION:        Unchanged cardiomegaly with diffuse
pulmonary edema.        Amparo Russ D.O.     Dictated by:  SUSAN Huerta on 3/06/2018 at 7:51        Electronically approved by:  Amparo Russ D.O. 
on 3/06/2018 at 7:51                    Dictated By: AMPARO Zavala Signed By: AMPARO RUSS DO on 18  Transcribed By: ELVIA on        COPY TO:   ADELAIDA GIRALDO MD        CHEST XRAY LINE PLACEMENT 
  [place] [address]      Patient Name: MARGY PEÑA   MR #: [id number]    : 1960 Age/Sex: 57/F  Acct #: [id number] Req #: [id number]  Adm Physician: BRIAN DIETZ MD    Ordered by: BRIAN MELVIN MD  Report #: [id number]   Location:
[facility]  Room/Bed: [place] [address]    _________________________________________________
__________________________________________________    Procedure: [id number] DX/CH
EST XRAY LINE PLACEMENT  Exam Date: 18                            Exam Jon
e: 0225       REPORT STATUS: Signed    EXAMINATION:  CHEST XRAY LINE PLACEMENT  
       INDICATION:           PICC line placement       COMPARISON:  3/5/2018    
      FINDINGS:   TUBES and LINES:  Interval placement of right upper extremity 
PICC line with   tip at the level of the mid SVC. Endotracheal tube is stable in
good position      LUNGS:  Lungs are not well inflated.  There are bibasilar 
atelectasis.   There   is perihilar interstital opacities, consistent with inter
stitial edema.      PLEURA:  No pleural effusion or pneumothorax.      HEART AND
MEDIASTINUM:  Cardiac size is severely enlarged. There are   atherosclerotic ca
lcifications within the aorta.      BONES AND SOFT TISSUES:  No acute osseous le
paulo.  Soft tissues are   unremarkable.      UPPER ABDOMEN: No free air under th
e diaphragm.          IMPRESSION:    1.  Cardiogenic pulmonary edema, stable.   
2.  Right PICC line in good position.         Signed by: Dr. Ferdinand Ghosh M.D. o
n 3/6/2018 2:46 AM        Dictated By: FERDINAND YU MD  Electronically S
igned By: FERDINAND YU MD on 18  Transcribed By: RICKY on 0
3/06/18 0246       COPY TO:   BRIAN MELVIN MD        CHEST SINGLE (PORTABLE)   
[place] [address]      Patient Name: MARGY PEÑA   MR #: [id number]    : 1960 Age/Sex: 57/F  Acct #: [id number] Req #: [id number]  Adm Physician: BRIAN DIETZ MD    Ordered by: JANNETH YANG MD  Report #: [id number]   Location:
[facility]  Room/Bed: [place] [address]    _________________________________________________
__________________________________________________    Procedure: [id number] DX/CH
EST SINGLE (PORTABLE)  Exam Date: 18                            Exam Time:
2245       REPORT STATUS: Signed    EXAMINATION:  CHEST SINGLE (PORTABLE)       
  INDICATION:           Post intubation       COMPARISON:  3/5/2018 at 17 hours 
         FINDINGS:   TUBES and LINES:  Endotracheal tube is visualized 1.9 cm 
above the carly.      LUNGS:  Lungs are not well inflated.  There are bibasilar
atelectasis.   There   is perihilar interstitial opacities, consistent with in
terstitial edema.      PLEURA:  Small right pleural effusion.      HEART AND MED
IASTINUM:  Cardiac size is severely enlarged.          BONES AND SOFT TISSUES:  
No acute osseous lesion.  Soft tissues are   unremarkable.      UPPER ABDOMEN: N
o free air under the diaphragm.          IMPRESSION:    Severe cardiogenic pulmo
nary edema, slightly improved         Signed by: Dr. Ferdinand Ghosh M.D. on 3/6/20
18 12:21 AM        Dictated By: FERDINAND YU MD  Electronically Signed B
y: FERDINAND YU MD on 18  Transcribed By: RICKY on 18       COPY TO:   JANNETH YANG MD        CHEST SINGLE (PORTABLE)    [place] [address]      Patient Name: MARGY PEÑA   MR #: [id number]    : 1960 Age/Sex: 57/F  Acct #: [id number] Req #: [id number]  Adm Physician:   
 Ordered by: KIMANI BALES NP  Report #: [id number]   Location: ER  Room/Bed: 
   ___________________________________________________________________________
________________________    Procedure: [id number] DX/CHEST SINGLE (PORTABLE)  Exa
m Date: 18                            Exam Time: 1700       REPORT STATUS:
Signed    PROCEDURE:   A single AP view of the chest.       COMPARISON: 18  
    INDICATIONS:   SHORTNESS OF BREATH           FINDINGS:   Lines/tubes:  None.
      Lungs: Limited by low lung volumes and body habitus. Pulmonary vascular   
congestion and moderate interstitial edema.   Pleura:  There is no visible p
neumothorax. Small bilateral pleural    effusions.       Heart and mediastinum: 
Enlarged cardiomediastinal silhouette.       Bones:  No acute bony abnormality. 
     IMPRESSION:        Limited as above.   Enlarged cardiomediastinal silhouet
te, pulmonary vascular congestion,    moderate interstitial edema, and small vianney
ateral pleural effusions.           Dictated by:  Annabella Morgan M.D. on 3/05/20
18 at 17:32        Electronically approved by:  Annabella Morgan M.D. on 3/05/2018
at 17:32                   Dictated By: ANNABELLA MORGAN MD  Electronically Signed 
By: ANNABELLA MORGAN MD on 18  Transcribed By: ELVIA on 18   
   COPY TO:   KIMANI BALES NP        CHEST SINGLE (PORTABLE)    [place] [address]      Patient Name: MARGY PEÑA   MR #: [id number]    : 1960 
Age/Sex: 57/F  Acct #: [id number] Req #: [id number]  Adm Physician: BRIAN DIETZ MD    Ordered by: Sagar Chan NP  Report #: [id number]   Location
: MED/SURG2  Room/Bed: [facility]    ________________________________________________
___________________________________________________    Procedure: [id number] DX/C
HEST SINGLE (PORTABLE)  Exam Date: 18                            Exam Time
: 0745       REPORT STATUS: Signed    EXAMINATION: Chest,  CHEST SINGLE (PORTABL
E)          INDICATION: Chest pain      COMPARISON:  Portable chest 2018   
       FINDINGS:          LINES:  Right internal jugular temporary central veno
us catheter with tip   projecting over the expected region of the superior vena 
cava.      Heart:  Normal cardiac silhouette.      Vascular: The pulmonary vascu
lature is within normal limits.  Atherosclerotic   calcifications of the aortic 
arch.      Mediastinum: No mediastinal, hilar, or axillary mass or lymphadenopat
hy.      Lungs: No parenchymal mass.  Airspace opacity in the left lung base.   
  Pleura:  Small left pleural effusion.  No pneumothorax.      Bones: No acute 
osseous abnormality.  Degenerative changes of the thoracic   spine.      Soft ti
ssues:  Normal.      Impression:    Airspace opacity in the left lung base may r
epresent a developing pneumonia.    Small left pleural effusion.      Signed by:
Dr. Filomena Whyte M.D. on 2018 8:55 AM        Dictated By: FILOMENA WHYTE MD  E
lectronically Signed By: FILOMENA WHYTE MD on 18  Transcribed By: CODEY SUTHERLAND on 18       COPY TO:   SAGAR CHAN NP        CHEST SINGLE 
(PORTABLE)    [place] [address]      Patient Name: MARGY PEÑA   MR #: 
[id number]    : 1960 Age/Sex: 57/F  Acct #: [id number] Req #: [id number]
[id number]  Adm Physician: BRIAN MELVIN MD    Ordered by: Sagar Chan NP  
Report #: [id number]   Location: MED/SURG2  Room/Bed: 202    
________________________________________________
___________________________________________________    Procedure: [id number] DX/C
HEST SINGLE (PORTABLE)  Exam Date: 18                            Exam Time
: 1340       REPORT STATUS: Signed    PROCEDURE:   A single AP view of the chest
.       COMPARISON: Chest radiograph 2018   INDICATIONS:   RESPIRATORY FAIL
URE           FINDINGS:   Lines/tubes:  Previous right IJ central venous tip ove
rlies the    expected brachiocephalic confluence.  Previous endotracheal tube   
currently not visualized.       Lungs: Persistent retrocardiac opacity may refl
ect atelectasis or    pneumonia.       Pleura: Likely small bilateral pleural ef
fusions. No pneumothorax.       Heart and mediastinum: Stable enlargement of the
cardiac silhouette.       Bones:  No acute bony abnormality.       IMPRESSION:  
 Persistent retrocardiac opacity may reflect atelectasis or pneumonia.          
    Dictated by:  Romeo Short M.D. on 2018 at 14:25        Electronic
ally approved by:  Romeo Short M.D. on 2018 at 14:26                   D
ictated By: ROMEO SHORT MD  Electronically Signed By: ROMEO SHORT MD on   Transcribed By: ELVIA on 18 1426       COPY TO:   SAGAR CHAN NP        US CHEST (INCL MEDIASTINUM)    [place] [address]      Patient Name: 
MARGY PEÑA   MR #: [id number]    : 1960 Age/Sex: 57/F  Acct #: 
[id number] Req #: [id number]  Adm Physician: BRIAN MELVIN MD    Ordered by: 
YORDAN SOTO MD  Report #: [id number]   Location: ICU  Room/Bed: ICU [facility]    
_____________________________________________________
______________________________________________    Procedure: [id number] US/US BOZENA
ST (INCL MEDIASTINUM)  Exam Date:                             Exam Time:        
REPORT STATUS: Signed    PROCEDURE:   US CHEST (INCL MEDIASTINUM)   COMPARISON: 
 [facility], , CHEST SINGLE (PORTABLE),    2018, 7:49.   IN
DICATIONS:   left effusion   TECHNIQUE:   Grayscale ultrasound chest bilaterally
      FINDINGS:      No evidence of pleural effusion bilaterally.           CON
CLUSION:      No conspicuous pleural effusion.           Dictated by:  Royce Cabello M.D. on 2018 at 11:55        Electronically approved by:  Royce Cabello M.D. on 2018 at    11:55                Dictated By: ROYCE DARDEN MD  Electronically Signed By: ROYCE CABELLO MD on 18 1155  Transcr
ibed By: ELVIA on 18 1155       COPY TO:   YORDAN SOTO MD        CHEST 
SINGLE (PORTABLE)    [place] [address]      Patient Name: MARGY PEÑA   MR #: 
[id number]    : 1960 Age/Sex: 57/F  Acct #: [id number] Req #: [id number]
[id number]  Adm Physician: BRIAN MELVIN MD    Ordered by: YORDAN SOTO MD  Report 
#: [id number]   Location: ICU  Room/Bed: ICU 1961    
_____________________________________________________
______________________________________________    Procedure: [id number] DX/CHEST 
SINGLE (PORTABLE)  Exam Date: 18                            Exam Time: 074
0       REPORT STATUS: Signed    PROCEDURE:   A single AP view of the chest.    
  COMPARISON: Portable chest 2018.       INDICATIONS:   Respiratory failur
e, Intubated           FINDINGS:   Lines/tubes: Right internal jugular temporary
central venous catheter    with tip projecting over the expected region of the 
superior vena cava.            Endotracheal catheter is present with the tip pro
jecting over the    expected region of the trachea, positioned 4 cm from the car
jessica.        Enteric feeding catheter with tip extending below the inferior regino
n    of the examination, likely within the gastric body.       Lungs: Airspace o
pacity in the left lung base. No parenchymal mass.       Pleura:  There is no pl
eural effusion or pneumothorax.       Heart and mediastinum:  The heart and the 
mediastinum are unremarkable.       Bones: Degenerative changes of the thoracic 
spine.       IMPRESSION:        Airspace opacity in the left lung base may repre
sent atelectasis or    developing pneumonia.       Dictated by:  SAMMIE Jeffries on 2018 at 8:36        Electronically approved by:  Filomena Whyte M.D. on
2018 at 8:36                Dictated By: FILOMENA WHYTE MD  Electronically Si
gned By: FILOMENA WHYTE MD on 18  Transcribed By: ELVIA on 18
      COPY TO:   YORDAN SOTO MD        CHEST SINGLE (PORTABLE)    [place] [address]      Patient Name: MARGY PEÑA   MR #: [id number]    : 1960 
Age/Sex: 57/F  Acct #: [id number] Req #: [id number]  Adm Physician: BRIAN DIETZ MD    Ordered by: YORDAN SOTO MD  Report #: [id number]   Location: [facility]  Room/Bed: -1    _____________________________________________________
______________________________________________    Procedure: [id number] DX/CHEST 
SINGLE (PORTABLE)  Exam Date: 18                            Exam Time: 091
0       REPORT STATUS: Signed    PROCEDURE:   A single AP view of the chest.    
  COMPARISON: 18   INDICATIONS:   INTUBATION           FINDINGS:   Lines/t
ubes: Stable endotracheal and nasogastric tubes. Stable right    internal jugula
r central line.       Lungs: Limited by low lung volumes and body habitus. Centr
al vascular    congestion and mild interstitial edema.       Pleura:  There is n
o visible pneumothorax. Trace bilateral pleural    effusions suspected.       He
art and mediastinum: Enlarged cardiomediastinal silhouette. Aorta is    calcifie
d and mildly tortuous.       Bones:  No acute bony abnormality.       IMPRESSION
:        No significant interval change from prior exam.   Central vascular bala
estion and mild interstitial edema.   Enlarged cardiac silhouette and suspected 
trace bilateral pleural    effusions.           Dictated by:  BERNARDO Langford on 2018 at 11:29        Electronically approved by:  Annabella Morgan M.D. 
on 2018 at 11:29                   Dictated By: ANNABELLA Hernandez
jennifer Signed By: ANNABELLA MORGAN MD on 18 112  Transcribed By: ELVIA on  112       COPY TO:   OYRDAN SOTO MD        [facility]    [place] [address]      Patient Name: MARGY PEÑA   MR #: [id number]    : 1960 Age/Sex: 57/F  Acct #: [id number] Req #: [id number]  Adm Physician: BRIAN DIETZ MD    Ordered by: BRIAN MELVIN MD  Report #: [id number]   Location:
ICU  Room/Bed: -1    ___________________________________________________
________________________________________________    Procedure: [id number] DX/ABDO
MEN-1VIEW (KRISTI)  Exam Date: 18                            Exam Time: 1630 
     REPORT STATUS: Signed    Exam:Abdominal radiograph one view      History:E
nteric tube placement      Comparison: None available      Findings:See impressi
on         Impression:      Nonobstructive bowel gas pattern.      Enteric tube 
with the distal tip left hemiabdomen presumably in the lateral   stomach body.  
   Signed by: Dr. Andrew Palisch, M.D. on 2018 5:18 PM        Dictated By: 
ANDREW R PALISCH MD  Electronically Signed By: ANDREW R PALISCH MD on 18  Transcribed By: RICKY on 18       COPY TO:   BRIAN MELVIN MD
       [facility] (PORTABLE)    [place] [address]      Patient Name: MARGY PEÑA 
 MR #: [id number]    : 1960 Age/Sex: 57/F  Acct #: [id number] Req #: 
[id number]  Adm Physician: BRIAN MELVIN MD    Ordered by: BRIAN MELVIN MD  
Report #: [id number]   Location: ICU  Room/Bed: -1    
___________________________________________________
________________________________________________    Procedure: [id number] DX/CHES
T SINGLE (PORTABLE)  Exam Date: 18                            Exam Time: 0
640       REPORT STATUS: Signed    Examination: Single AP view of the chest.    
 COMPARISON: 2018      INDICATION: Respiratory failure           DI
SCUSSION:      Lines/tubes:  Stable endotracheal tube, enteric tube, and right I
J catheter.      Lungs:  Stable interstitial and alveolar consolidation bilatera
lly.      Pleura:  Layering effusions.      Heart and mediastinum:  Heart size e
nlarged.      Bones and soft tissues:  No acute bony abnormalities.           IM
PRESSION:       1.   Cardiomegaly with stable pulmonary edema. Correlate for und
erlying   pneumonia.      Signed by: Dr. Andrew Palisch, M.D. on 2018 7:32 
AM        Dictated By: ANDREW R PALISCH MD  Electronically Signed By: ANDREW R P
ALISCH MD on 18  Transcribed By: RICKY on 18       COPY 
TO:   BRIAN MELVIN MD        CHEST SINGLE (PORTABLE)    [place] [address]      
Patient Name: MARGY PEÑA   MR #: [id number]    : 1960 Age/Sex: 57/F  
Acct #: [id number] Req #: [id number]  Adm Physician:     Ordered by: 
BEAN KELLY MD  Report #: [id number]   Location: ER  Room/Bed:     
_________________________________________________________________________
__________________________    Procedure: [id number] DX/CHEST SINGLE (PORTABLE)  E
xam Date: 18                            Exam Time: 1100       REPORT STATU
S: Signed    EXAMINATION:  CHEST SINGLE (PORTABLE)          INDICATION:         
            COMPARISON:  Chest radiograph from 2018           FINDINGS:  AP
view         TUBES and LINES:  Interval intubation with endotracheal tube tip 
located 2.1 cm   above the carly. Right IJ central venous catheter with tip now
overlying the   upper SVC, this appears higher when compared to most recent [facility]
st radiograph.      LUNGS:  No lumbar levels.  Worsening bilateral pulmonary nilda
ma.   Superimposed   infection cannot be excluded.      PLEURA:  No pleural effu
paulo or pneumothorax.      HEART AND MEDIASTINUM:  Marked enlargement of the car
diac silhouette may be due   to cardiomegaly and/or pericardial effusion.       
BONES AND SOFT TISSUES:  No acute osseous lesion.  Soft tissues are   unremarka
ble.      UPPER ABDOMEN: No free air under the diaphragm.          IMPRESSION:  
 Interval intubation. Right IJ central venous catheter tip is higher in the SVC 
 now.      Worsening bilateral pulmonary edema. Superimposed infection cannot be
excluded.         Signed by: Dr. Isabel Westfall M.D. on 2018 1
1:15 AM        Dictated By: ISABEL WESTFALL MD  Electronically Signed By
: ISABEL WESTFALL MD on 18 1115  Transcribed By: RICKY on  1115       COPY TO:   BEAN KELLY MD        CHEST SINGLE (PORTABLE)    
[place] [address]      Patient Name: MARGY PEÑA   MR #: [id number]    : 1960 Age/Sex: 57/F  Acct #: [id number] Req #: [id number]  Adm Physician:   
 Ordered by: BEAN KELLY MD  Report #: [id number]   Location: ER  Room/Be
d:     _________________________________________________________________________
__________________________    Procedure: [id number] DX/CHEST SINGLE (PORTABLE)  E
xam Date: 18                            Exam Time: 1001       REPORT STATU
S: Signed    EXAMINATION:  CHEST SINGLE (PORTABLE)          INDICATION:  Shortne
ss of breath, line placement                    COMPARISON:  None           FIND
INGS:  AP view         TUBES and LINES:  Right IJ central venous catheter with t
ip overlying the   cavoatrial junction.      LUNGS:  Low lung volumes.  Bilatera
l pulmonary edema.   Bibasilar atelectasis.   Both costophrenic angles not inclu
ded on this exam.      PLEURA:  Unable to evaluate the small pleural effusions b
ut no large pleural   effusions seen. No pneumothorax on limited evaluation.    
 HEART AND MEDIASTINUM:  Marked enlargement of the cardiac silhouette may be due
  to cardiomegaly and/or pericardial effusion.      BONES AND SOFT TISSUES:  No 
acute osseous lesion.  Soft tissues are   unremarkable.      UPPER ABDOMEN: No 
free air under the diaphragm.          IMPRESSION:    Right adjacent and venous 
catheter with tip overlying the cavoatrial junction.      Marked enlargement of 
the cardiac silhouette due to cardiomegaly and/or   pericardial effusion. Bilat
eral pulmonary edema.         Signed by: Dr. Isabel Westfall M.D. on  10:21 AM        Dictated By: ISABEL WESTFALL MD  Electronically 
Signed By: ISABEL WESTFALL MD on 18 1021  Transcribed By: RICKY 
on 18 1021       COPY TO:   BEAN KELLY MD

## 2020-05-21 NOTE — NUR
PT DEMANDED TO BE TAKEN OUT OF VEHICLE. PT STOOD AND GOT INTO W/C COMPLETELY ON 
HER OWN. PT ALSO DEMANDED TO BE PUT ON HOSPITAL OXYGEN "OR IM NOT GETTING OUT 
OF MY CAR." PT ALSO DEMANDED MULTIPLE LUGGAGE CONTAINERS BE TAKEN OUT OF BACK 
SEAT AND CARRIED IN BY STAFF. ALL DEMANDS WERE MET. PT BROUGHT IMMEDIATELY 
INSIDE FOR EVAL AND SEEN IN CAR BY MD AND STAFF ON HER ARRIVAL. PT IN NO ACUTE 
DISTRESS. AAOX4.

## 2020-05-21 NOTE — DIAGNOSTIC IMAGING REPORT
X-ray chest AP portable



History: Shortness of breath



Comparison: 5/4/2020



Findings: Central airways unremarkable. Atherosclerotic aorta. Cardiomegaly.

Possible mild interstitial edema. No pneumothorax. Small pleural effusions

cannot be ruled out. Visualized skeletal structures unremarkable. Upper abdomen

not well-visualized.



Impression: Underpenetrated exam because of the patient's body habitus.

Possibility of mild interstitial pulmonary edema is raised.



Signed by: Maximino Fang MD on 5/21/2020 11:57 AM

## 2020-05-21 NOTE — EMERGENCY DEPARTMENT NOTE
History of Present Illnes


History of Present Illness


Chief Complaint:  Respiratory


History of Present Illness


This is a 59 year old  female arrived to the ED with complaints of SOB 

over several days, worsening.


Chief Complaint Comment SOB MORE THAN USUAL OVER LAST TWO DAYS. OXYGEN DEPENDANT

AT 5-6 LPM PER PT. PT AAOX4. ON OXYGEN. EX SMOKER AND COPD AND CHF. PT WITH 

BILATERAL LOWER EXTREMITY PITTING EDEMA AND STATES LEGS WILL "OOZE" 

PERIODICALLY. SEEN BY MD ON ARRIVAL AND TRIAGE.


Historian:  Patient


Arrival Mode:  Car


 Required:  No


Onset (how long ago):  day(s)


Severity:  moderate


Onset quality:  gradual


Timing of current episode:  intermittent


Progression:  worsening


Context:  recent illness





Past Medical/Family History


Physician Review


I have reviewed the patient's past medical and family history.  Any updates have

been documented here.





Past Medical History


Recent Fever:  No


Clinical Suspicion of Infectio:  No


New/Unexplained Change in Ment:  No


Past Medical History:  Hypertension, Diabetes, COPD, CHF, Asthma, Anxiety, 

Depression, GERD, Hyperlipedemia, Chronic Kidney Disease


Other Medical History:  


hx of pneumonia





MORBID OBESITY





OXYGEN DEPENDANT


Past Surgical History:  Hysterectomy


Other Surgery:  


tonsellectomy





Cardiac ablation.





Other


Last Tetanus:  UNK





Review of Systems


Review of Systems


Constitutional:  no symptoms


EENTM:  no symptoms


Cardiovascular:  no symptoms


Respiratory:  dyspnea, dyspnea on exertion


Gastrointestinal:  no symptoms


Genitourinary:  no symptoms


Musculoskeletal:  no symptoms


Neurological:  no symptoms


Psychological:  no symptoms


Endocrine:  no symptoms


Hematological/Lymphatic:  no symptoms


Review of other systems


All other systems reviewed and negative.





Physical Exam


Related Data


Allergies:  


Coded Allergies:  


     Penicillins (Verified  Allergy, Severe, ITCHING, ANAPHYLAXIS, 1/27/18)


     Sulfa (Sulfonamide Antibiotics) (Verified  Allergy, Intermediate, 1/27/18)


     naproxen (Verified  Allergy, Intermediate, 1/27/18)


     rivaroxaban (Verified  Adverse Reaction, Severe, 6/27/19)


   "Too thin blood", "was told it made me bleed", was advised


   to not ever take again.


Triage Vital Signs





Vital Signs








  Date Time  Temp Pulse Resp B/P (MAP) Pulse Ox O2 Delivery O2 Flow Rate FiO2


 


5/21/20 10:10 98.7 109 24 159/87 97   








Vital signs reviewed:  Yes





Physical Exam


CONSTITUTIONAL





Constitutional:  obese, morbidly obese


HENT


HENT:  normocephalic, atraumatic, oropharynx clear/moist, nose normal


HENT L/R:  left ext ear normal, right ext ear normal


EYES





Eyes:  PERRL, conjunctivae normal


NECK


Neck:  ROM normal


PULMONARY


Pulmonary:  effort normal, respiratory distress, other (breath sounds reduced 

diffusely )


CARDIOVASCULAR





Cardiovascular:  regular rhythm, heart sounds normal, capillary refill normal, 

normal rate


GASTROINTESTINAL





Abdominal:  soft, nontender, bowel sounds normal


GENITOURINARY





Genitourinary:  exam deferred


SKIN


Skin:  warm, dry


MUSCULOSKELETAL





Musculoskeletal:  ROM normal


NEUROLOGICAL





Neurological:  alert, oriented x 3, no gross motor or sensory deficits


PSYCHOLOGICAL


Psychological:  mood/affect normal, judgement normal





Results


Laboratory


Result Diagram:  


5/21/20 1043                                                                    

           5/21/20 1043





Laboratory





Laboratory Tests








Test


 5/21/20


11:59 5/21/20


10:43


 


Arterial Blood pH


 7.29


(7.35-7.45) 





 


Arterial Blood Partial


Pressure CO2 73 mmHg


(35-45) 





 


Arterial Blood Partial


Pressure O2  mmHg () 


 





 


Arterial Blood HCO3


 35 mmol/L


(22-26) 





 


Arterial Blood Oxygen


Saturation  % (95-98) 


 





 


Arterial Blood Base Excess


 8.0 mmol/L (-2


- 3) 





 


FiO2 50 %  


 


White Blood Count


 


 10.42 x10e3/uL


(4.8-10.8)


 


Red Blood Count


 


 3.34 x10e6/uL


(3.6-5.1)


 


Hemoglobin


 


 9.3 g/dL


(12.0-16.0)


 


Hematocrit


 


 31.6 %


(34.2-44.1)


 


Mean Corpuscular Volume


 


 94.6 fL


(81-99)


 


Mean Corpuscular Hemoglobin


 


 27.8 pg


(28-32)


 


Mean Corpuscular Hemoglobin


Concent 


 29.4 g/dL


(31-35)


 


Red Cell Distribution Width


 


 14.6 %


(11.7-14.4)


 


Platelet Count


 


 175 x10e3/uL


(140-360)


 


Neutrophils (%) (Auto)


 


 80.1 %


(38.7-80.0)


 


Lymphocytes (%) (Auto)


 


 10.0 %


(18.0-39.1)


 


Monocytes (%) (Auto)


 


 6.3  %


(4.4-11.3)


 


Eosinophils (%) (Auto)


 


 2.8 %


(0.0-6.0)


 


Basophils (%) (Auto)


 


 0.4 %


(0.0-1.0)


 


Neutrophils # (Auto)  8.4 (2.1-6.9) 


 


Lymphocytes # (Auto)  1.0 (1.0-3.2) 


 


Monocytes # (Auto)  0.7 (0.2-0.8) 


 


Eosinophils # (Auto)  0.3 (0.0-0.4) 


 


Basophils # (Auto)  0.0 (0.0-0.1) 


 


Absolute Immature Granulocyte


(auto 


 0.04 x10e3/uL


(0-0.1)


 


Sodium Level


 


 138 mmol/L


(136-145)


 


Potassium Level


 


 4.6 mmol/L


(3.5-5.1)


 


Chloride Level


 


 99 mmol/L


()


 


Carbon Dioxide Level


 


 31 mmol/L


(22-29)


 


Anion Gap


 


 12.6 mmol/L


(8-16)


 


Blood Urea Nitrogen


 


 43 mg/dL


(7-26)


 


Creatinine


 


 1.84 mg/dL


(0.57-1.11)


 


Estimat Glomerular Filtration


Rate 


 28 ML/MIN


(60-)


 


BUN/Creatinine Ratio  23 (6-25) 


 


Glucose Level


 


 196 mg/dL


()


 


Calcium Level


 


 8.7 mg/dL


(8.4-10.2)


 


Total Bilirubin


 


 0.4 mg/dL


(0.2-1.2)


 


Aspartate Amino Transf


(AST/SGOT) 


 11 IU/L (5-34) 





 


Alanine Aminotransferase


(ALT/SGPT) 


 12 IU/L (0-55) 





 


Alkaline Phosphatase


 


 100 IU/L


()


 


Creatine Kinase


 


 64 IU/L


()


 


Creatine Kinase MB


 


 2.50 ng/mL


(0-5.0)


 


Troponin I


 


 0.026 ng/mL


(0-0.300)


 


B-Type Natriuretic Peptide


 


 303.7 pg/mL


(0-100)


 


Total Protein


 


 6.3 g/dL


(6.5-8.1)


 


Albumin


 


 2.8 g/dL


(3.5-5.0)


 


Globulin


 


 3.5 g/dL


(2.3-3.5)


 


Albumin/Globulin Ratio  0.8 (0.8-2.0) 








Laboratory Tests








Test


 5/21/20


11:59 5/21/20


10:43


 


Arterial Blood pH


 7.29


(7.35-7.45) 





 


Arterial Blood Partial


Pressure CO2 73 mmHg


(35-45) 





 


Arterial Blood Partial


Pressure O2  mmHg () 


 





 


Arterial Blood HCO3


 35 mmol/L


(22-26) 





 


Arterial Blood Oxygen


Saturation  % (95-98) 


 





 


Arterial Blood Base Excess


 8.0 mmol/L (-2


- 3) 





 


FiO2 50 %  


 


White Blood Count


 


 10.42 x10e3/uL


(4.8-10.8)


 


Red Blood Count


 


 3.34 x10e6/uL


(3.6-5.1)


 


Hemoglobin


 


 9.3 g/dL


(12.0-16.0)


 


Hematocrit


 


 31.6 %


(34.2-44.1)


 


Mean Corpuscular Volume


 


 94.6 fL


(81-99)


 


Mean Corpuscular Hemoglobin


 


 27.8 pg


(28-32)


 


Mean Corpuscular Hemoglobin


Concent 


 29.4 g/dL


(31-35)


 


Red Cell Distribution Width


 


 14.6 %


(11.7-14.4)


 


Platelet Count


 


 175 x10e3/uL


(140-360)


 


Neutrophils (%) (Auto)


 


 80.1 %


(38.7-80.0)


 


Lymphocytes (%) (Auto)


 


 10.0 %


(18.0-39.1)


 


Monocytes (%) (Auto)


 


 6.3  %


(4.4-11.3)


 


Eosinophils (%) (Auto)


 


 2.8 %


(0.0-6.0)


 


Basophils (%) (Auto)


 


 0.4 %


(0.0-1.0)


 


Neutrophils # (Auto)  8.4 (2.1-6.9) 


 


Lymphocytes # (Auto)  1.0 (1.0-3.2) 


 


Monocytes # (Auto)  0.7 (0.2-0.8) 


 


Eosinophils # (Auto)  0.3 (0.0-0.4) 


 


Basophils # (Auto)  0.0 (0.0-0.1) 


 


Absolute Immature Granulocyte


(auto 


 0.04 x10e3/uL


(0-0.1)


 


Sodium Level


 


 138 mmol/L


(136-145)


 


Potassium Level


 


 4.6 mmol/L


(3.5-5.1)


 


Chloride Level


 


 99 mmol/L


()


 


Carbon Dioxide Level


 


 31 mmol/L


(22-29)


 


Anion Gap


 


 12.6 mmol/L


(8-16)


 


Blood Urea Nitrogen


 


 43 mg/dL


(7-26)


 


Creatinine


 


 1.84 mg/dL


(0.57-1.11)


 


Estimat Glomerular Filtration


Rate 


 28 ML/MIN


(60-)


 


BUN/Creatinine Ratio  23 (6-25) 


 


Glucose Level


 


 196 mg/dL


()


 


Calcium Level


 


 8.7 mg/dL


(8.4-10.2)


 


Total Bilirubin


 


 0.4 mg/dL


(0.2-1.2)


 


Aspartate Amino Transf


(AST/SGOT) 


 11 IU/L (5-34) 





 


Alanine Aminotransferase


(ALT/SGPT) 


 12 IU/L (0-55) 





 


Alkaline Phosphatase


 


 100 IU/L


()


 


Creatine Kinase


 


 64 IU/L


()


 


Creatine Kinase MB


 


 2.50 ng/mL


(0-5.0)


 


Troponin I


 


 0.026 ng/mL


(0-0.300)


 


B-Type Natriuretic Peptide


 


 303.7 pg/mL


(0-100)


 


Total Protein


 


 6.3 g/dL


(6.5-8.1)


 


Albumin


 


 2.8 g/dL


(3.5-5.0)


 


Globulin


 


 3.5 g/dL


(2.3-3.5)


 


Albumin/Globulin Ratio  0.8 (0.8-2.0) 








Lab results reviewed:  Yes





Imaging


Imaging results reviewed:  Yes


Impressions





Findings: Central airways unremarkable. Atherosclerotic aorta. Cardiomegaly.


Possible mild interstitial edema. No pneumothorax. Small pleural effusions


cannot be ruled out. Visualized skeletal structures unremarkable. Upper abdomen


not well-visualized.





Impression: Underpenetrated exam because of the patient's body habitus.


Possibility of mild interstitial pulmonary edema is raised.





Procedures


12 Lead ECG Interpretation


:  Interpreted by ED physician


Date:  May 21, 2020


Time:  14:09


Prior ECD tracings:  reviewed


Rhythm:  sinus tachycardia


Rate:  normal


QRS axis:  normal


ST segments normal:  Yes


T waves normal:  Yes


Clinical Impression:  normal ECG





ABG Interpretation


Interpretation:  respiratory acidosis





Critical Care Time


Total Critical Care Time (min):  45


Critical care time exclusive o:  separately billable procedures


Critcal care necessary due to:  respiratory failure


Subsequent provider


I assumed direction of critical care for this patient from another provider of 

my specialty.





Assessment & Plan


Reassessment


Reassessment time:  11:00


Reassessment


59y f presented to ed c/o SOB MORE THAN USUAL OVER LAST TWO DAYS. OXYGEN 

DEPENDANT AT 5-6 LPM PER PT. PT AAOX4. ON OXYGEN. EX SMOKER AND COPD AND CHF. PT

WITH BILATERAL LOWER EXTREMITY PITTING EDEMA AND STATES LEGS WILL "OOZE" 

PERIODICALLY. SEEN BY MD ON ARRIVAL AND TRIAGE.





Assessment & Plan


Final Impression:  


(1) COPD (chronic obstructive pulmonary disease)


(2) Respiratory distress


(3) Hypoxia


(4) Respiratory failure with hypoxia and hypercapnia


(5) CHF (congestive heart failure)


Assessment & Plan


Pt PLACED ON BIPAP on arrival for acute respiratory distress, no signs of 

infection at time of admission


cbc, cmp, cardiac markers


CXR


Last Vital Signs











  Date Time  Temp Pulse Resp B/P (MAP) Pulse Ox O2 Delivery O2 Flow Rate FiO2


 


5/21/20 11:34  104 20 122/76 96   


 


5/21/20 10:10 98.7       








Home Meds


Active Scripts


Prednisone (PREDNISONE) 20 Mg Tab, 10 MG PO DAILY for 10 Days, TAB


   TAKE 20MG PO DAILY X3 DAYS THEN


   TAKE 10MG PO DAILY X3 DAYS THEN


   TAKE 5MG PO DAILY X4 DAYS THEN STOP


   Prov:SAGAR CHAN NP         5/6/20


Bumetanide (BUMETANIDE) 1 Mg Tablet, 2 MG PO DAILY, #30 TAB


   Prov:SAGAR CHAN NP         5/6/20


Guaifenesin/Dextromethorphan (MUCINEX DM -30 MG TABLET) 1 Each Tab.er.12h,

1 EACH PO BID, #20


   Prov:SAGAR CHAN NP         5/6/20


Levofloxacin (LEVAQUIN) 500 Mg Tablet, 500 MG PO DAILY for 4 Days


   Prov:SAGAR CHAN NP         5/6/20


Metoclopramide Hcl (METOCLOPRAMIDE HCL) 10 Mg Tablet, 10 MG PO ACHS for 14 Days,

#60 TAB 0 Refills


   Prov:CHRISTINE ALMANZA NP         4/2/20


[Albuterol/Ipratropium Nebulize] 3 ML INHA No Conflict Check, 3 ML NEB RQ4H for 

14 Days, #90 UNITS 0 Refills


   Prov:CHRISTINE ALMANZA NP         4/2/20


[Calcium Carbonate] 500 MG TAB No Conflict Check, 500 MG PO BID for 30 Days


   Prov:SAGAR CHAN NP         7/1/19


Pantoprazole Sodium* (PROTONIX) 40 Mg Tablet.dr, 40 MG PO ACB for 30 Days, TAB


   Prov:SAGAR CHAN NP         7/1/19


Multivit With Calcium,Iron,Min (MULTIPLE VITAMINS FOR WOMEN) 1 Each Tablet, 1 

TAB PO DAILY for 30 Days


   Prov:SAGAR CHAN NP         3/16/18


Reported Medications


Estrogens Conjugated (PREMARIN) 0.625 Mg Tab, 0.625 MG PO DAILY, #30 TAB


   3/24/20


Atorvastatin Calcium (ATORVASTATIN CALCIUM) 20 Mg Tablet, 40 MG PO HS, #30 TAB


   3/24/20


Sertraline Hcl (ZOLOFT) 50 Mg Tablet, 100 MG PO HS, #30 TAB


   3/24/20


Theophylline Anhydrous (THEOPHYLLINE ANHYDROUS) 200 Mg Tab.er.12h, 300 MG PO 

DAILY, #30 TAB


   3/24/20


Apixaban (Eliquis) 5 Mg Tablet, 5 MG PO BID


   3/24/20


[NystatinCream]   No Conflict Check, 1 APPLIC TOP BID


   3/24/20


Metoprolol Tartrate (METOPROLOL TARTRATE) 25 Mg Tablet, 25 MG PO DAILY, TAB


   3/24/20


Hydrocodone Bit/Acetaminophen (NORCO  TABLET) 1 Each Tablet, 1 TAB PO TID 

PRN for MODERATE PAIN (4-6)


   3/24/20


[EpiPen]   No Conflict Check, 1 UNIT IM PRN PRN for ALLERGY


   3/24/20


Diazepam (VALIUM) 5 Mg Tablet, 5 MG PO BID PRN for ANXIETY


   3/24/20


Insulin Detemir (LEVEMIR) 100 Unit/1 Ml Vial, 15 UNITS SC BID


   3/6/18


Ezetimibe (ZETIA) 10 Mg Tablet, 10 MG PO DAILY, #30 TAB


   3/6/18


Fluticasone/Salmeterol (ADVAIR 250-50 DISKUS) 1 Each Disk.w.dev, 1 DOSE INH 

DAILY


   3/6/18


Sumatriptan Succinate (IMITREX) 25 Mg Tablet, 25 MG PO DAILY PRN for HEADACHE


   3/6/18


Tiotropium Bromide (SPIRIVA) 18 Mcg Cap.w.dev, 18 MCG INH DAILY, BOTTLE


   3/6/18


[Duoneb]   No Conflict Check, 3 ML NEB QID PRN for SHORTNESS OF BREATH


   3/6/18


Medications in the ED





Aspirin 81 mg PRN  ONCE PO ;  Start 5/21/20 at 10:15;  Stop 5/21/20 at 10:41;  

Status DC


Methylprednisolone Sodium Succinate 125 mg ONCE  ONCE IV ;  Start 5/21/20 at 

12:00;  Stop 5/21/20 at 12:02;  Status DC


Albuterol/ Ipratropium 9 ml ONCE  ONCE NEB ;  Start 5/21/20 at 12:00;  Stop 5/ 21/20 at 12:01;  Status DC


Furosemide 40 mg ONCE  ONCE IV ;  Start 5/21/20 at 12:30;  Stop 5/21/20 at 

12:33;  Status DC











ANDRES SANTANA DO            May 21, 2020 13:02

## 2023-01-01 NOTE — DIAGNOSTIC IMAGING REPORT
Examination: Thoracic ultrasound.



Clinical indication: Pleural effusion.



Comparison examination: Chest radiograph same day.



Technique: Transverse and longitudinal sonographic images of the right and left

thoracic cavities was performed.



Findings:



Trace right pleural effusion. No significant left pleural effusion.



Impression:



Trace right pleural effusion.



Signed by: Dr. Lupillo Wynne M.D. on 6/26/2019 10:08 AM I will STOP taking the medications listed below when I get home from the hospital:  None
